# Patient Record
Sex: FEMALE | Race: BLACK OR AFRICAN AMERICAN | NOT HISPANIC OR LATINO | Employment: FULL TIME | RURAL
[De-identification: names, ages, dates, MRNs, and addresses within clinical notes are randomized per-mention and may not be internally consistent; named-entity substitution may affect disease eponyms.]

---

## 2024-02-08 DIAGNOSIS — M83.9 VITAMIN D DEFICIENT OSTEOMALACIA: Primary | ICD-10-CM

## 2024-02-12 RX ORDER — ERGOCALCIFEROL 1.25 MG/1
50000 CAPSULE ORAL
Qty: 28 CAPSULE | Refills: 3 | Status: SHIPPED | OUTPATIENT
Start: 2024-02-12

## 2024-02-15 ENCOUNTER — OFFICE VISIT (OUTPATIENT)
Dept: PRIMARY CARE CLINIC | Facility: CLINIC | Age: 44
End: 2024-02-15
Payer: COMMERCIAL

## 2024-02-15 ENCOUNTER — PATIENT MESSAGE (OUTPATIENT)
Dept: PRIMARY CARE CLINIC | Facility: CLINIC | Age: 44
End: 2024-02-15
Payer: COMMERCIAL

## 2024-02-15 VITALS
OXYGEN SATURATION: 98 % | WEIGHT: 271 LBS | HEIGHT: 62 IN | DIASTOLIC BLOOD PRESSURE: 84 MMHG | BODY MASS INDEX: 49.87 KG/M2 | HEART RATE: 74 BPM | RESPIRATION RATE: 16 BRPM | SYSTOLIC BLOOD PRESSURE: 138 MMHG | TEMPERATURE: 98 F

## 2024-02-15 DIAGNOSIS — B37.9 ANTIBIOTIC-INDUCED YEAST INFECTION: ICD-10-CM

## 2024-02-15 DIAGNOSIS — Z00.00 ROUTINE GENERAL MEDICAL EXAMINATION AT A HEALTH CARE FACILITY: Primary | ICD-10-CM

## 2024-02-15 DIAGNOSIS — T36.95XA ANTIBIOTIC-INDUCED YEAST INFECTION: ICD-10-CM

## 2024-02-15 DIAGNOSIS — Z12.11 SCREENING FOR MALIGNANT NEOPLASM OF COLON: ICD-10-CM

## 2024-02-15 DIAGNOSIS — Z79.899 ENCOUNTER FOR LONG-TERM CURRENT USE OF MEDICATION: ICD-10-CM

## 2024-02-15 DIAGNOSIS — Z13.1 SCREENING FOR DIABETES MELLITUS: ICD-10-CM

## 2024-02-15 DIAGNOSIS — Z76.0 MEDICATION REFILL: ICD-10-CM

## 2024-02-15 DIAGNOSIS — Z00.00 ENCOUNTER FOR MEDICAL EXAMINATION TO ESTABLISH CARE: ICD-10-CM

## 2024-02-15 DIAGNOSIS — Z86.39 HISTORY OF HYPOKALEMIA: ICD-10-CM

## 2024-02-15 DIAGNOSIS — Z13.220 SCREENING FOR LIPID DISORDERS: ICD-10-CM

## 2024-02-15 DIAGNOSIS — Z11.59 NEED FOR HEPATITIS C SCREENING TEST: ICD-10-CM

## 2024-02-15 DIAGNOSIS — Z11.4 SCREENING FOR HIV (HUMAN IMMUNODEFICIENCY VIRUS): ICD-10-CM

## 2024-02-15 DIAGNOSIS — I10 PRIMARY HYPERTENSION: ICD-10-CM

## 2024-02-15 PROCEDURE — 3079F DIAST BP 80-89 MM HG: CPT | Mod: ,,, | Performed by: NURSE PRACTITIONER

## 2024-02-15 PROCEDURE — 1159F MED LIST DOCD IN RCRD: CPT | Mod: ,,, | Performed by: NURSE PRACTITIONER

## 2024-02-15 PROCEDURE — 3008F BODY MASS INDEX DOCD: CPT | Mod: ,,, | Performed by: NURSE PRACTITIONER

## 2024-02-15 PROCEDURE — 3075F SYST BP GE 130 - 139MM HG: CPT | Mod: ,,, | Performed by: NURSE PRACTITIONER

## 2024-02-15 PROCEDURE — 99396 PREV VISIT EST AGE 40-64: CPT | Mod: ,,, | Performed by: NURSE PRACTITIONER

## 2024-02-15 PROCEDURE — 4010F ACE/ARB THERAPY RXD/TAKEN: CPT | Mod: ,,, | Performed by: NURSE PRACTITIONER

## 2024-02-15 RX ORDER — POTASSIUM CHLORIDE 750 MG/1
10 TABLET, EXTENDED RELEASE ORAL DAILY
Qty: 90 TABLET | Refills: 3 | Status: SHIPPED | OUTPATIENT
Start: 2024-02-15

## 2024-02-15 RX ORDER — LOSARTAN POTASSIUM 50 MG/1
50 TABLET ORAL DAILY
Qty: 90 TABLET | Refills: 3 | Status: SHIPPED | OUTPATIENT
Start: 2024-02-15 | End: 2025-02-09

## 2024-02-15 RX ORDER — FLUCONAZOLE 150 MG/1
150 TABLET ORAL DAILY
Qty: 1 TABLET | Refills: 2 | Status: SHIPPED | OUTPATIENT
Start: 2024-02-15 | End: 2024-02-18

## 2024-02-15 RX ORDER — GABAPENTIN 300 MG/1
300 CAPSULE ORAL 3 TIMES DAILY
Qty: 270 CAPSULE | Refills: 3 | Status: SHIPPED | OUTPATIENT
Start: 2024-02-15 | End: 2024-02-15

## 2024-02-15 NOTE — PROGRESS NOTES
Pt is a 42 y/o BF here for [facility], to est care, and for med refills.    Past Medical History:   Diagnosis Date    Asthma     COVID     Hypertension      Patient Active Problem List   Diagnosis    Asthma    Hypertension    Chronic migraine with aura    Intractable migraine with aura without status migrainosus    Abdominal obesity and metabolic syndrome    Chronic idiopathic constipation    Acute left-sided low back pain with left-sided sciatica    Insomnia    Arthritis of left knee       Review of Systems   Constitutional:  Negative for chills and fever.   Respiratory:  Negative for shortness of breath.    Cardiovascular:  Negative for chest pain.   Gastrointestinal:  Negative for abdominal pain, blood in stool, constipation, diarrhea, melena, nausea and vomiting.   Skin:  Negative for rash.   Neurological:  Negative for weakness.     Physical Exam  Vitals reviewed. Exam conducted with a chaperone present.   Constitutional:       General: She is not in acute distress.     Appearance: Normal appearance.   HENT:      Head: Normocephalic.      Mouth/Throat:      Mouth: Mucous membranes are moist.      Pharynx: Oropharynx is clear.   Eyes:      General: No scleral icterus.     Pupils: Pupils are equal, round, and reactive to light.   Cardiovascular:      Rate and Rhythm: Normal rate.   Pulmonary:      Effort: Pulmonary effort is normal. No respiratory distress.      Breath sounds: Normal breath sounds.   Abdominal:      General: There is no distension.      Palpations: Abdomen is soft.      Tenderness: There is no abdominal tenderness. There is no guarding or rebound.   Skin:     General: Skin is warm and dry.   Neurological:      General: No focal deficit present.      Mental Status: She is alert and oriented to person, place, and time. Mental status is at baseline.   Psychiatric:         Mood and Affect: Mood normal.         Behavior: Behavior normal.         Thought Content: Thought content normal.          Judgment: Judgment normal.       Plan  Routine general medical examination at a health care facility  -     CBC Auto Differential; Future; Expected date: 02/15/2024  -     Comprehensive Metabolic Panel; Future; Expected date: 02/15/2024    Screening for diabetes mellitus  -     POCT glucose    Screening for lipid disorders  -     Lipid Panel; Future    Need for hepatitis C screening test  -     Cancel: Hepatitis C Antibody; Future; Expected date: 02/15/2024    Screening for HIV (human immunodeficiency virus)  -     Cancel: HIV 1/2 Ag/Ab (4th Gen); Future; Expected date: 02/15/2024    History of hypokalemia  -     Comprehensive Metabolic Panel; Future; Expected date: 02/15/2024  -     potassium chloride (KLOR-CON) 10 MEQ TbSR; Take 1 tablet (10 mEq total) by mouth once daily.  Dispense: 90 tablet; Refill: 3    Encounter for long-term current use of medication  -     CBC Auto Differential; Future; Expected date: 02/15/2024  -     Comprehensive Metabolic Panel; Future; Expected date: 02/15/2024    Screening for malignant neoplasm of colon  -     Colonoscopy; Future; Expected date: 02/15/2024    Primary hypertension  -     losartan (COZAAR) 50 MG tablet; Take 1 tablet (50 mg total) by mouth once daily.  Dispense: 90 tablet; Refill: 3    Medication refill  -     potassium chloride (KLOR-CON) 10 MEQ TbSR; Take 1 tablet (10 mEq total) by mouth once daily.  Dispense: 90 tablet; Refill: 3  -     losartan (COZAAR) 50 MG tablet; Take 1 tablet (50 mg total) by mouth once daily.  Dispense: 90 tablet; Refill: 3      Pt states she has already had HIV & HCV screening  Follow up in about 6 months (around 8/15/2024).

## 2024-02-16 ENCOUNTER — PATIENT OUTREACH (OUTPATIENT)
Dept: ADMINISTRATIVE | Facility: HOSPITAL | Age: 44
End: 2024-02-16

## 2024-02-16 NOTE — PROGRESS NOTES
Population Health Chart Review & Patient Outreach Details      Further Action Needed If Patient Returns Outreach:            Updates Requested / Reviewed:     []  Care Everywhere    []     []  External Sources ([facility], [facility], [facility], etc.)    [] [facility]   [] [facility]   [] Other:    []  Care Team Updated   []  Removed  or Duplicate Orders   []  Immunization Reconciliation Completed / Queried    [] [place]   [] [place]   [] [place]   [] [place]      Health Maintenance Topics Addressed and Outreach Outcomes / Actions Taken:             Breast Cancer Screening []  Mammogram Order Placed    []  Mammogram Screening Scheduled    []  External Records Requested & Care Team Updated if Applicable    []  External Records Uploaded & Care Team Updated if Applicable    []  Pt Declined Scheduling Mammogram    []  Pt Will Schedule with External Provider / Order Routed & Care Team Updated if Applicable              Cervical Cancer Screening []  Pap Smear Scheduled in Primary Care or OBGYN    []  External Records Requested & Care Team Updated if Applicable       []  External Records Uploaded, Care Team Updated, & History Updated if Applicable    []  Patient Declined Scheduling Pap Smear    []  Patient Will Schedule with External Provider & Care Team Updated if Applicable                  Colorectal Cancer Screening []  Colonoscopy Case Request / Referral / Home Test Order Placed    []  External Records Requested & Care Team Updated if Applicable    []  External Records Uploaded, Care Team Updated, & History Updated if Applicable    []  Patient Declined Completing Colon Cancer Screening    []  Patient Will Schedule with External Provider & Care Team Updated if Applicable    []  Fit Kit Mailed (add the SmartPhrase under additional notes)    []  Reminded Patient to Complete Home Test                Diabetic Eye Exam []  Eye Exam Screening Order Placed    []  Eye Camera Scheduled or Optometry/Ophthalmology Referral  Placed    []  External Records Requested & Care Team Updated if Applicable    []  External Records Uploaded, Care Team Updated, & History Updated if Applicable    []  Patient Declined Scheduling Eye Exam    []  Patient Will Schedule with External Provider & Care Team Updated if Applicable             Blood Pressure Control []  Primary Care Follow Up Visit Scheduled     []  Remote Blood Pressure Reading Captured    []  Patient Declined Remote Reading or Scheduling Appt - Escalated to PCP    []  Patient Will Call Back or Send Portal Message with Reading                 HbA1c & Other Labs []  Overdue Lab(s) Ordered    []  Overdue Lab(s) Scheduled    []  External Records Uploaded & Care Team Updated if Applicable    []  Primary Care Follow Up Visit Scheduled     []  Reminded Patient to Complete A1c Home Test    []  Patient Declined Scheduling Labs or Will Call Back to Schedule    []  Patient Will Schedule with External Provider / Order Routed, & Care Team Updated if Applicable           Primary Care Appointment []  Primary Care Appt Scheduled    []  Patient Declined Scheduling or Will Call Back to Schedule    []  Pt Established with External Provider, Updated Care Team, & Informed Pt to Notify Payor if Applicable           Medication Adherence /    Statin Use []  Primary Care Appointment Scheduled    []  Patient Reminded to  Prescription    []  Patient Declined, Provider Notified if Needed    []  Sent Provider Message to Review to Evaluate Pt for Statin, Add Exclusion Dx Codes, Document   Exclusion in Problem List, Change Statin Intensity Level to Moderate or High Intensity if Applicable                Osteoporosis Screening []  Dexa Order Placed    []  Dexa Appointment Scheduled    []  External Records Requested & Care Team Updated    []  External Records Uploaded, Care Team Updated, & History Updated if Applicable    []  Patient Declined Scheduling Dexa or Will Call Back to Schedule    []  Patient Will Schedule  with External Provider / Order Routed & Care Team Updated if Applicable       Additional Notes:.  Post visit Population Health review of encounter with date of service  2/15/24 with Kimberly.  All required HY components in encounter.    Followup appt for: Pt needs appt for 2025 HY sent to [facility] to schedule via one note. Lisa

## 2024-02-21 DIAGNOSIS — Z12.11 COLON CANCER SCREENING: Primary | ICD-10-CM

## 2024-02-21 RX ORDER — POLYETHYLENE GLYCOL 3350, SODIUM SULFATE ANHYDROUS, SODIUM BICARBONATE, SODIUM CHLORIDE, POTASSIUM CHLORIDE 236; 22.74; 6.74; 5.86; 2.97 G/4L; G/4L; G/4L; G/4L; G/4L
4 POWDER, FOR SOLUTION ORAL ONCE
Qty: 4000 ML | Refills: 0 | Status: SHIPPED | OUTPATIENT
Start: 2024-02-21 | End: 2024-02-21

## 2024-02-22 ENCOUNTER — OFFICE VISIT (OUTPATIENT)
Dept: NEUROLOGY | Facility: CLINIC | Age: 44
End: 2024-02-22
Payer: COMMERCIAL

## 2024-02-22 VITALS
HEART RATE: 103 BPM | WEIGHT: 263 LBS | DIASTOLIC BLOOD PRESSURE: 80 MMHG | SYSTOLIC BLOOD PRESSURE: 120 MMHG | BODY MASS INDEX: 48.4 KG/M2 | OXYGEN SATURATION: 99 % | HEIGHT: 62 IN

## 2024-02-22 DIAGNOSIS — G43.119 INTRACTABLE MIGRAINE WITH AURA WITHOUT STATUS MIGRAINOSUS: Primary | ICD-10-CM

## 2024-02-22 DIAGNOSIS — R11.0 NAUSEA: ICD-10-CM

## 2024-02-22 PROCEDURE — 1160F RVW MEDS BY RX/DR IN RCRD: CPT | Mod: ,,, | Performed by: NURSE PRACTITIONER

## 2024-02-22 PROCEDURE — 4010F ACE/ARB THERAPY RXD/TAKEN: CPT | Mod: ,,, | Performed by: NURSE PRACTITIONER

## 2024-02-22 PROCEDURE — 3074F SYST BP LT 130 MM HG: CPT | Mod: ,,, | Performed by: NURSE PRACTITIONER

## 2024-02-22 PROCEDURE — 99213 OFFICE O/P EST LOW 20 MIN: CPT | Mod: S$PBB,,, | Performed by: NURSE PRACTITIONER

## 2024-02-22 PROCEDURE — 1159F MED LIST DOCD IN RCRD: CPT | Mod: ,,, | Performed by: NURSE PRACTITIONER

## 2024-02-22 PROCEDURE — 3008F BODY MASS INDEX DOCD: CPT | Mod: ,,, | Performed by: NURSE PRACTITIONER

## 2024-02-22 PROCEDURE — 99214 OFFICE O/P EST MOD 30 MIN: CPT | Mod: PBBFAC | Performed by: NURSE PRACTITIONER

## 2024-02-22 PROCEDURE — 3079F DIAST BP 80-89 MM HG: CPT | Mod: ,,, | Performed by: NURSE PRACTITIONER

## 2024-02-22 RX ORDER — RIMEGEPANT SULFATE 75 MG/75MG
TABLET, ORALLY DISINTEGRATING ORAL
Qty: 16 TABLET | Refills: 2 | Status: SHIPPED | OUTPATIENT
Start: 2024-02-22

## 2024-02-22 RX ORDER — PROMETHAZINE HYDROCHLORIDE 25 MG/1
TABLET ORAL
Qty: 20 TABLET | Refills: 2 | Status: SHIPPED | OUTPATIENT
Start: 2024-02-22

## 2024-02-22 NOTE — PROGRESS NOTES
Subjective:       Patient ID: Rosa Rutledge is a 43 y.o. female     Chief Complaint:    Chief Complaint   Patient presents with    Follow-up        Allergies:  Patient has no known allergies.    Current Medications:    Outpatient Encounter Medications as of 2024   Medication Sig Dispense Refill    budesonide 180mcg (PULMICORT FLEXHALER) 180 mcg/actuation AePB Inhale 2 puffs into the lungs every 4 to 6 hours as needed.      ergocalciferol (ERGOCALCIFEROL) 50,000 unit Cap TAKE 1 CAPSULE BY MOUTH TWICE A WEEK 28 capsule 3    galcanezumab-gnlm (EMGALITY PEN) 120 mg/mL PnIj Inject 120 mg into the skin every 28 days. 1 mL 11    LINZESS 145 mcg Cap capsule Take 1 capsule (145 mcg total) by mouth before breakfast. 90 capsule 3    losartan (COZAAR) 50 MG tablet Take 1 tablet (50 mg total) by mouth once daily. 90 tablet 3    mv,calcium,min/iron/folic/vitK (ONE-A-DAY WOMEN'S COMPLETE ORAL) Take 1 tablet by mouth once daily.      potassium chloride (KLOR-CON) 10 MEQ TbSR Take 1 tablet (10 mEq total) by mouth once daily. 90 tablet 3    [DISCONTINUED] NURTEC 75 mg odt DISSOLVE 1 TABLET ON THE TONGUE AT ONSET HEADACHE      [DISCONTINUED] promethazine (PHENERGAN) 25 MG tablet TAKE 1 TABLET BY MOUTH EVERY 8 HOURS AS NEEDED FOR MIGRAINE 20 tablet 0    NURTEC 75 mg odt DISSOLVE 1 TABLET ON THE TONGUE AT ONSET HEADACHE 16 tablet 2    [] polyethylene glycol (GOLYTELY) 236-22.74-6.74 -5.86 gram suspension Take 4,000 mLs (4 L total) by mouth once. for 1 dose 4000 mL 0    promethazine (PHENERGAN) 25 MG tablet TAKE 1 TABLET BY MOUTH EVERY 8 HOURS AS NEEDED FOR MIGRAINE 20 tablet 2    tiZANidine (ZANAFLEX) 4 MG tablet TAKE 1 TABLET BY MOUTH ONCE DAILY AT NIGHT AS NEEDED FOR MUSCLE SPASM       No facility-administered encounter medications on file as of 2024.       History of Present Illness  43 yr old A.A. right handed female presents with headaches.    Prior Trokendi XR was not effective, though it did help with the  intensity.  She was still experiencing about 8 migraines a month.  Rated 6/10, bilateral frontal with photophobia and nausea and vomiting.    She is doing very well on the Emgality once monthly and denies any complications.  On maxalt and phenergan to use PRN.  Prior relpax was not effective.    Vitamin D level in November was 50.    She follows with opthamology every April           Review of Systems  Review of Systems   Constitutional:  Negative for diaphoresis and fever.   HENT:  Negative for congestion, hearing loss and tinnitus.    Eyes:  Negative for blurred vision, double vision, photophobia, discharge and redness.   Respiratory:  Negative for cough and shortness of breath.    Cardiovascular:  Negative for chest pain.   Gastrointestinal:  Negative for abdominal pain, nausea and vomiting.   Musculoskeletal:  Negative for back pain, joint pain, myalgias and neck pain.   Skin:  Negative for itching and rash.   Neurological:  Positive for headaches. Negative for dizziness, tremors, sensory change, speech change, focal weakness, seizures, loss of consciousness and weakness.   Psychiatric/Behavioral:  Negative for depression, hallucinations and memory loss. The patient does not have insomnia.    All other systems reviewed and are negative.     Objective:     NEUROLOGICAL EXAMINATION:     MENTAL STATUS   Oriented to person, place, and time.   Registration: recalls 3 of 3 objects. Recall at 5 minutes: recalls 3 of 3 objects.   Attention: normal. Concentration: normal.   Speech: speech is normal   Level of consciousness: alert  Knowledge: good and consistent with education.   Normal comprehension.     CRANIAL NERVES     CN II   Visual fields full to confrontation.   Visual acuity: normal  Right visual field deficit: none  Left visual field deficit: none     CN III, IV, VI   Pupils are equal, round, and reactive to light.  Extraocular motions are normal.   Right pupil: Size: 3 mm. Shape: regular. Reactivity: brisk.  Consensual response: intact. Accommodation: intact.   Left pupil: Size: 3 mm. Shape: regular. Reactivity: brisk. Consensual response: intact. Accommodation: intact.   CN III: no CN III palsy  CN VI: no CN VI palsy  Nystagmus: none   Diplopia: none  Upgaze: normal  Downgaze: normal  Conjugate gaze: present  Vestibulo-ocular reflex: present    CN V   Facial sensation intact.   Right facial sensation deficit: none  Left facial sensation deficit: none  Right corneal reflex: normal  Left corneal reflex: normal    CN VII   Facial expression full, symmetric.   Right facial weakness: none  Left facial weakness: none  Right taste: normal  Left taste: normal    CN VIII   CN VIII normal.   Hearing: intact    CN IX, X   CN IX normal.   CN X normal.   Palate: symmetric    CN XI   CN XI normal.   Right sternocleidomastoid strength: normal  Left sternocleidomastoid strength: normal  Right trapezius strength: normal  Left trapezius strength: normal    CN XII   CN XII normal.   Tongue: not atrophic  Fasciculations: absent  Tongue deviation: none    MOTOR EXAM   Muscle bulk: normal  Overall muscle tone: normal  Right arm tone: normal  Left arm tone: normal  Right arm pronator drift: absent  Left arm pronator drift: absent  Right leg tone: normal  Left leg tone: normal    Strength   Right neck flexion: 5/5  Left neck flexion: 5/5  Right neck extension: 5/5  Left neck extension: 5/5  Right deltoid: 5/5  Left deltoid: 5/5  Right biceps: 5/5  Left biceps: 5/5  Right triceps: 5/5  Left triceps: 5/5  Right wrist flexion: 5/5  Left wrist flexion: 5/5  Right wrist extension: 5/5  Left wrist extension: 5/5  Right interossei: 5/5  Left interossei: 5/5  Right iliopsoas: 5/5  Left iliopsoas: 5/5  Right quadriceps: 5/5  Left quadriceps: 5/5  Right hamstrin/5  Left hamstrin/5  Right anterior tibial: 5/5  Left anterior tibial: 5/5  Right posterior tibial: 5/5  Left posterior tibial: 5/5  Right gastroc: 5/5  Left gastroc: 5/5    REFLEXES      Reflexes   Right brachioradialis: 2+  Left brachioradialis: 2+  Right biceps: 2+  Left biceps: 2+  Right triceps: 2+  Left triceps: 2+  Right patellar: 2+  Left patellar: 2+  Right achilles: 2+  Left achilles: 2+  Right plantar: normal  Left plantar: normal  Right Cruz: absent  Left Cruz: absent  Right ankle clonus: absent  Left ankle clonus: absent  Right pendular knee jerk: absent  Left pendular knee jerk: absent    SENSORY EXAM   Light touch normal.   Right arm light touch: normal  Left arm light touch: normal  Right leg light touch: normal  Left leg light touch: normal  Vibration normal.   Right arm vibration: normal  Left arm vibration: normal  Right leg vibration: normal  Left leg vibration: normal  Proprioception normal.   Right arm proprioception: normal  Left arm proprioception: normal  Right leg proprioception: normal  Left leg proprioception: normal  Pinprick normal.   Right arm pinprick: normal  Left arm pinprick: normal  Right leg pinprick: normal  Left leg pinprick: normal  Graphesthesia: normal  Romberg: negative  Stereognosis: normal    GAIT AND COORDINATION     Gait  Gait: normal     Coordination   Finger to nose coordination: normal  Heel to shin coordination: normal  Tandem walking coordination: normal    Tremor   Resting tremor: absent  Intention tremor: absent  Action tremor: absent       Physical Exam  Vitals and nursing note reviewed.   Constitutional:       Appearance: Normal appearance.   HENT:      Head: Normocephalic.   Eyes:      Extraocular Movements: Extraocular movements intact and EOM normal.      Pupils: Pupils are equal, round, and reactive to light.   Cardiovascular:      Rate and Rhythm: Normal rate and regular rhythm.   Pulmonary:      Effort: Pulmonary effort is normal.      Breath sounds: Normal breath sounds.   Musculoskeletal:         General: No swelling or tenderness. Normal range of motion.      Cervical back: Normal range of motion and neck supple.      Right  lower leg: No edema.      Left lower leg: No edema.   Skin:     General: Skin is warm and dry.      Coloration: Skin is not jaundiced.      Findings: No rash.   Neurological:      General: No focal deficit present.      Mental Status: She is alert and oriented to person, place, and time.      GCS: GCS eye subscore is 4. GCS verbal subscore is 5. GCS motor subscore is 6.      Cranial Nerves: No cranial nerve deficit.      Sensory: No sensory deficit.      Motor: Motor function is intact. No weakness.      Coordination: Coordination is intact. Coordination normal. Finger-Nose-Finger Test, Heel to Shin Test and Romberg Test normal.      Gait: Gait is intact. Gait and tandem walk normal.      Deep Tendon Reflexes: Reflexes normal.      Reflex Scores:       Tricep reflexes are 2+ on the right side and 2+ on the left side.       Bicep reflexes are 2+ on the right side and 2+ on the left side.       Brachioradialis reflexes are 2+ on the right side and 2+ on the left side.       Patellar reflexes are 2+ on the right side and 2+ on the left side.       Achilles reflexes are 2+ on the right side and 2+ on the left side.  Psychiatric:         Mood and Affect: Mood normal.         Speech: Speech normal.         Behavior: Behavior normal.          Assessment:     Problem List Items Addressed This Visit          Neuro    Intractable migraine with aura without status migrainosus - Primary    Relevant Medications    NURTEC 75 mg odt     Other Visit Diagnoses       Nausea        Relevant Medications    promethazine (PHENERGAN) 25 MG tablet                 Primary Diagnosis and ICD10  Intractable migraine with aura without status migrainosus [G43.119]    Plan:     Patient Instructions   1. Continue Emgality once monthly injections    2. Continue phenergan and maxalt PRN    3. Continue the Nurtec as directed    4. Keep opthamology followup    Medications Discontinued During This Encounter   Medication Reason    promethazine  (PHENERGAN) 25 MG tablet Reorder    NURTEC 75 mg odt Reorder         Requested Prescriptions     Signed Prescriptions Disp Refills    NURTEC 75 mg odt 16 tablet 2     Sig: DISSOLVE 1 TABLET ON THE TONGUE AT ONSET HEADACHE    promethazine (PHENERGAN) 25 MG tablet 20 tablet 2     Sig: TAKE 1 TABLET BY MOUTH EVERY 8 HOURS AS NEEDED FOR MIGRAINE       No orders of the defined types were placed in this encounter.

## 2024-02-28 ENCOUNTER — OFFICE VISIT (OUTPATIENT)
Dept: OTOLARYNGOLOGY | Facility: CLINIC | Age: 44
End: 2024-02-28
Payer: COMMERCIAL

## 2024-02-28 VITALS — WEIGHT: 263 LBS | HEIGHT: 62 IN | BODY MASS INDEX: 48.4 KG/M2

## 2024-02-28 DIAGNOSIS — H65.21 RIGHT CHRONIC SEROUS OTITIS MEDIA: ICD-10-CM

## 2024-02-28 DIAGNOSIS — H60.91 OTITIS EXTERNA OF RIGHT EAR, UNSPECIFIED CHRONICITY, UNSPECIFIED TYPE: Primary | ICD-10-CM

## 2024-02-28 PROCEDURE — 1160F RVW MEDS BY RX/DR IN RCRD: CPT | Mod: ,,, | Performed by: OTOLARYNGOLOGY

## 2024-02-28 PROCEDURE — 99213 OFFICE O/P EST LOW 20 MIN: CPT | Mod: PBBFAC | Performed by: OTOLARYNGOLOGY

## 2024-02-28 PROCEDURE — 99204 OFFICE O/P NEW MOD 45 MIN: CPT | Mod: S$PBB,,, | Performed by: OTOLARYNGOLOGY

## 2024-02-28 PROCEDURE — 1159F MED LIST DOCD IN RCRD: CPT | Mod: ,,, | Performed by: OTOLARYNGOLOGY

## 2024-02-28 PROCEDURE — 4010F ACE/ARB THERAPY RXD/TAKEN: CPT | Mod: ,,, | Performed by: OTOLARYNGOLOGY

## 2024-02-28 PROCEDURE — 3008F BODY MASS INDEX DOCD: CPT | Mod: ,,, | Performed by: OTOLARYNGOLOGY

## 2024-02-28 RX ORDER — AMOXICILLIN AND CLAVULANATE POTASSIUM 500; 125 MG/1; MG/1
1 TABLET, FILM COATED ORAL 2 TIMES DAILY
Qty: 28 TABLET | Refills: 0 | Status: SHIPPED | OUTPATIENT
Start: 2024-02-28 | End: 2024-04-12

## 2024-02-28 RX ORDER — NEOMYCIN SULFATE, POLYMYXIN B SULFATE AND HYDROCORTISONE 10; 3.5; 1 MG/ML; MG/ML; [USP'U]/ML
3 SUSPENSION/ DROPS AURICULAR (OTIC) 2 TIMES DAILY
Qty: 10 ML | Refills: 0 | Status: SHIPPED | OUTPATIENT
Start: 2024-02-28 | End: 2024-06-12

## 2024-02-28 NOTE — PROGRESS NOTES
Subjective:       Patient ID: Rosa Rutledge is a 43 y.o. female.    Chief Complaint: Cerumen Impaction (Right ear worse than left ear. Pt states she gets ear infections in her right ear about 3 years with a ear cleaning. )    HPI  Review of Systems   HENT:  Positive for ear pain.    All other systems reviewed and are negative.      Objective:      Physical Exam  General: NAD  Head: Normocephalic, atraumatic, no facial asymmetry/normal strength,  Ears: Both auricules normal in appearance, w/o deformities tympanic membranes dull external auditory canals dry irritated   Nose: External nose w/o deformities normal turbinates no drainage or inflammation  Oral Cavity: Lips, gums, floor of mouth, tongue hard palate, and buccal mucosa without mass/lesion  Oropharynx: Mucosa pink and moist, soft palate, posterior pharynx and oropharyngeal wall without mass/lesion  Neck: Supple, symmetric, trachea midline, no palpable mass/lesion, no palpable cervical lymphadenopathy  Skin: Warm and dry, no concerning lesions  Respiratory: Respirations even, unlabored  Assessment:       1. Otitis externa of right ear, unspecified chronicity, unspecified type    2. Right chronic serous otitis media        Plan:       Augmentin CSP drops f/u 2 weeks

## 2024-03-18 ENCOUNTER — OFFICE VISIT (OUTPATIENT)
Dept: ORTHOPEDICS | Facility: CLINIC | Age: 44
End: 2024-03-18
Payer: COMMERCIAL

## 2024-03-18 VITALS — BODY MASS INDEX: 48.4 KG/M2 | HEIGHT: 62 IN | WEIGHT: 263 LBS

## 2024-03-18 DIAGNOSIS — M25.562 LEFT KNEE PAIN, UNSPECIFIED CHRONICITY: Primary | ICD-10-CM

## 2024-03-18 PROCEDURE — 99213 OFFICE O/P EST LOW 20 MIN: CPT | Mod: PBBFAC,25 | Performed by: ORTHOPAEDIC SURGERY

## 2024-03-18 PROCEDURE — 4010F ACE/ARB THERAPY RXD/TAKEN: CPT | Mod: ,,, | Performed by: ORTHOPAEDIC SURGERY

## 2024-03-18 PROCEDURE — 1159F MED LIST DOCD IN RCRD: CPT | Mod: ,,, | Performed by: ORTHOPAEDIC SURGERY

## 2024-03-18 PROCEDURE — 99213 OFFICE O/P EST LOW 20 MIN: CPT | Mod: S$PBB,25,, | Performed by: ORTHOPAEDIC SURGERY

## 2024-03-18 PROCEDURE — 99999PBSHW PR PBB SHADOW TECHNICAL ONLY FILED TO HB: Mod: PBBFAC,,,

## 2024-03-18 PROCEDURE — 20610 DRAIN/INJ JOINT/BURSA W/O US: CPT | Mod: PBBFAC | Performed by: ORTHOPAEDIC SURGERY

## 2024-03-18 RX ORDER — TRIAMCINOLONE ACETONIDE 40 MG/ML
40 INJECTION, SUSPENSION INTRA-ARTICULAR; INTRAMUSCULAR
Status: DISCONTINUED | OUTPATIENT
Start: 2024-03-18 | End: 2024-03-18 | Stop reason: HOSPADM

## 2024-03-18 RX ORDER — BUPIVACAINE HYDROCHLORIDE 2.5 MG/ML
1 INJECTION, SOLUTION EPIDURAL; INFILTRATION; INTRACAUDAL
Status: DISCONTINUED | OUTPATIENT
Start: 2024-03-18 | End: 2024-03-18 | Stop reason: HOSPADM

## 2024-03-18 RX ADMIN — BUPIVACAINE HYDROCHLORIDE 1 ML: 2.5 INJECTION, SOLUTION EPIDURAL; INFILTRATION; INTRACAUDAL at 01:03

## 2024-03-18 RX ADMIN — TRIAMCINOLONE ACETONIDE 40 MG: 40 INJECTION, SUSPENSION INTRA-ARTICULAR; INTRAMUSCULAR at 01:03

## 2024-03-18 NOTE — PROCEDURES
Large Joint Aspiration/Injection: L knee    Date/Time: 3/18/2024 1:30 PM    Performed by: Dontae Downing MD  Authorized by: Dontae Downing MD    Consent Done?:  Yes (Verbal)  Indications:  Arthritis    Details:  Needle Size:  22 G  Ultrasonic Guidance for needle placement?: No    Approach:  Anterolateral  Location:  Knee  Site:  L knee  Medications:  1 mL BUPivacaine (PF) 0.25% (2.5 mg/ml) 0.25 % (2.5 mg/mL); 40 mg triamcinolone acetonide 40 mg/mL  Patient tolerance:  Patient tolerated the procedure well with no immediate complications

## 2024-03-18 NOTE — PROGRESS NOTES
Patient is here for follow-up of her left knee arthritic changes.  I injected her left knee 1 cc Marcaine 1 cc Kenalog.  Let her use her leg as tolerates.  She weightbear as tolerates.  I will follow up 3 months.Rosa Rutledge presents today for knee pain from ploa.  leftKnee prepped sterile technique and injected with 1cc marcaine snd 1cc kenalog.  Tolerated procedure well. Verbal consent obtained

## 2024-04-10 ENCOUNTER — TELEPHONE (OUTPATIENT)
Dept: NEUROLOGY | Facility: CLINIC | Age: 44
End: 2024-04-10
Payer: COMMERCIAL

## 2024-04-12 ENCOUNTER — ANESTHESIA EVENT (OUTPATIENT)
Dept: GASTROENTEROLOGY | Facility: HOSPITAL | Age: 44
End: 2024-04-12
Payer: COMMERCIAL

## 2024-04-12 ENCOUNTER — HOSPITAL ENCOUNTER (OUTPATIENT)
Dept: GASTROENTEROLOGY | Facility: HOSPITAL | Age: 44
Discharge: HOME | End: 2024-04-12
Attending: NURSE PRACTITIONER | Admitting: INTERNAL MEDICINE
Payer: COMMERCIAL

## 2024-04-12 ENCOUNTER — ANESTHESIA (OUTPATIENT)
Dept: GASTROENTEROLOGY | Facility: HOSPITAL | Age: 44
End: 2024-04-12
Payer: COMMERCIAL

## 2024-04-12 VITALS
DIASTOLIC BLOOD PRESSURE: 43 MMHG | RESPIRATION RATE: 16 BRPM | SYSTOLIC BLOOD PRESSURE: 105 MMHG | HEIGHT: 62 IN | TEMPERATURE: 97 F | HEART RATE: 78 BPM | OXYGEN SATURATION: 99 % | WEIGHT: 238 LBS | BODY MASS INDEX: 43.79 KG/M2

## 2024-04-12 DIAGNOSIS — Z12.11 SCREENING FOR MALIGNANT NEOPLASM OF COLON: ICD-10-CM

## 2024-04-12 PROCEDURE — 45380 COLONOSCOPY AND BIOPSY: CPT | Mod: 33,,, | Performed by: INTERNAL MEDICINE

## 2024-04-12 PROCEDURE — 88305 TISSUE EXAM BY PATHOLOGIST: CPT | Mod: TC,SUR | Performed by: INTERNAL MEDICINE

## 2024-04-12 PROCEDURE — 88342 IMHCHEM/IMCYTCHM 1ST ANTB: CPT | Mod: 26,,, | Performed by: PATHOLOGY

## 2024-04-12 PROCEDURE — 88305 TISSUE EXAM BY PATHOLOGIST: CPT | Mod: 26,,, | Performed by: PATHOLOGY

## 2024-04-12 PROCEDURE — 37000008 HC ANESTHESIA 1ST 15 MINUTES

## 2024-04-12 PROCEDURE — 37000009 HC ANESTHESIA EA ADD 15 MINS

## 2024-04-12 PROCEDURE — D9220A PRA ANESTHESIA: Mod: 33,,, | Performed by: NURSE ANESTHETIST, CERTIFIED REGISTERED

## 2024-04-12 PROCEDURE — 63600175 PHARM REV CODE 636 W HCPCS: Performed by: NURSE ANESTHETIST, CERTIFIED REGISTERED

## 2024-04-12 PROCEDURE — 27000284 HC CANNULA NASAL: Performed by: NURSE ANESTHETIST, CERTIFIED REGISTERED

## 2024-04-12 PROCEDURE — 25000003 PHARM REV CODE 250: Performed by: NURSE ANESTHETIST, CERTIFIED REGISTERED

## 2024-04-12 PROCEDURE — 45380 COLONOSCOPY AND BIOPSY: CPT | Mod: PT | Performed by: INTERNAL MEDICINE

## 2024-04-12 RX ORDER — FENTANYL CITRATE 50 UG/ML
INJECTION, SOLUTION INTRAMUSCULAR; INTRAVENOUS
Status: DISCONTINUED | OUTPATIENT
Start: 2024-04-12 | End: 2024-04-12

## 2024-04-12 RX ORDER — PROPOFOL 10 MG/ML
VIAL (ML) INTRAVENOUS
Status: DISCONTINUED | OUTPATIENT
Start: 2024-04-12 | End: 2024-04-12

## 2024-04-12 RX ORDER — SODIUM CHLORIDE 9 MG/ML
INJECTION, SOLUTION INTRAVENOUS CONTINUOUS PRN
Status: DISCONTINUED | OUTPATIENT
Start: 2024-04-12 | End: 2024-04-12

## 2024-04-12 RX ORDER — LIDOCAINE HYDROCHLORIDE 20 MG/ML
INJECTION, SOLUTION EPIDURAL; INFILTRATION; INTRACAUDAL; PERINEURAL
Status: DISCONTINUED | OUTPATIENT
Start: 2024-04-12 | End: 2024-04-12

## 2024-04-12 RX ORDER — SODIUM CHLORIDE 0.9 % (FLUSH) 0.9 %
10 SYRINGE (ML) INJECTION
Status: DISCONTINUED | OUTPATIENT
Start: 2024-04-12 | End: 2024-04-13 | Stop reason: HOSPADM

## 2024-04-12 RX ADMIN — PROPOFOL 50 MG: 10 INJECTION, EMULSION INTRAVENOUS at 12:04

## 2024-04-12 RX ADMIN — FENTANYL CITRATE 50 MCG: 50 INJECTION INTRAMUSCULAR; INTRAVENOUS at 12:04

## 2024-04-12 RX ADMIN — LIDOCAINE HYDROCHLORIDE 100 MG: 20 INJECTION, SOLUTION INTRAVENOUS at 12:04

## 2024-04-12 RX ADMIN — SODIUM CHLORIDE: 9 INJECTION, SOLUTION INTRAVENOUS at 12:04

## 2024-04-12 NOTE — DISCHARGE INSTRUCTIONS
Procedure Date  4/12/24     Impression  Overall Impression:   Favor anal fissure in posterior canal  Stricture  with patchy ulcers and erythema in the terminal ileum concerning for crohn's disease vs NSAID induced injury; performed cold forceps biopsy  The ileocecal valve, cecum, ascending colon, transverse colon, descending colon, sigmoid colon and rectum appeared normal. Performed random biopsy using biopsy forceps.  (grade 2) hemorrhoids        Recommendation    Await pathology results  Surveillance interval to be determined after pathology reviewed  If pathology is concerning for crohn's disease, may need MRI pelvis to evaluate for perianal disease  Start Nifedipine-lidocaine ointment to anal canal BID for 3 months - Rx provided   Will need follow up in clinic - will determine after pathology reviewed      Start a daily fiber supplement with Citrucel or Fibercon (can purchase at your local pharmacy)  Use Miralax 17 grams daily-to-twice daily as needed to have a regular, soft BM without straining  Drink at least 60-80 ounces of water daily unless you have a medical condition that requires fluid restriction      Outcome of procedure: successful Colonoscopy  Disposition: patient to recovery following procedure; discharge to home when appropriate parameters met  Provisions for follow up: please call my office for any unexpected symptoms like chest or abdominal pain or bleeding following your procedure.  Final Diagnosis: TI stricture      Indication  Screening for malignant neoplasm of colon

## 2024-04-12 NOTE — TRANSFER OF CARE
"Anesthesia Transfer of Care Note    Patient: Rosa Rutledge    Procedure(s) Performed: * No procedures listed *    Patient location: GI    Anesthesia Type: general    Transport from OR: Transported from OR on room air with adequate spontaneous ventilation    Post pain: adequate analgesia    Post assessment: no apparent anesthetic complications    Post vital signs: stable    Level of consciousness: responds to stimulation    Nausea/Vomiting: no nausea/vomiting    Complications: none    Transfer of care protocol was followed      Last vitals: Visit Vitals  BP (!) 105/43 (BP Location: Left arm, Patient Position: Lying)   Pulse 78   Temp 36.1 °C (97 °F) (Oral)   Resp 16   Ht 5' 2" (1.575 m)   Wt 108 kg (238 lb)   SpO2 99%   Breastfeeding No   BMI 43.53 kg/m²     "

## 2024-04-12 NOTE — H&P
Gastroenterology Pre-procedure H&P    History of Present Illness    Rosa Rutledge is a 43 y.o. female that  has a past medical history of Asthma, COVID, and Hypertension.     Patient here for routine index screening     Patient denies wt loss, abdominal pain, diarrhea, melena/hematochezia, change in stool caliber, no anticoagulants, FMHx of GI related malignancies.      Past Medical History:   Diagnosis Date    Asthma     COVID     Hypertension        Past Surgical History:   Procedure Laterality Date    ADENOIDECTOMY      BREAST SURGERY Bilateral     reduction    CHOLECYSTECTOMY  11/12/2020    HYSTERECTOMY  2016    Full    REDUCTION OF BOTH BREASTS  01/09/2020    TONSILLECTOMY      TOTAL REDUCTION MAMMOPLASTY      TUBAL LIGATION         Family History   Problem Relation Age of Onset    Diabetes Mother     Hypertension Mother     Diabetes Maternal Grandmother     Hypertension Maternal Grandmother     Asthma Maternal Grandmother     Asthma Sister     Hypertension Sister        Social History     Socioeconomic History    Marital status:     Number of children: 2   Occupational History    Occupation: [employer]      Employer: [employer]   Tobacco Use    Smoking status: Never    Smokeless tobacco: Never   Substance and Sexual Activity    Alcohol use: Never    Drug use: Never    Sexual activity: Yes     Partners: Male   Social History Narrative    Lives at home with  and second child (13)    No smoking in home , No smoking in home        Current Outpatient Medications   Medication Sig Dispense Refill    budesonide 180mcg (PULMICORT FLEXHALER) 180 mcg/actuation AePB Inhale 2 puffs into the lungs every 4 to 6 hours as needed.      ergocalciferol (ERGOCALCIFEROL) 50,000 unit Cap TAKE 1 CAPSULE BY MOUTH TWICE A WEEK 28 capsule 3    LINZESS 145 mcg Cap capsule Take 1 capsule (145 mcg total) by mouth before breakfast. 90 capsule 3    losartan (COZAAR) 50 MG tablet Take 1 tablet (50 mg total) by mouth once daily. 90 tablet 3  "   mv,calcium,min/iron/folic/vitK (ONE-A-DAY WOMEN'S COMPLETE ORAL) Take 1 tablet by mouth once daily.      NURTEC 75 mg odt DISSOLVE 1 TABLET ON THE TONGUE AT ONSET HEADACHE 16 tablet 2    potassium chloride (KLOR-CON) 10 MEQ TbSR Take 1 tablet (10 mEq total) by mouth once daily. 90 tablet 3    promethazine (PHENERGAN) 25 MG tablet TAKE 1 TABLET BY MOUTH EVERY 8 HOURS AS NEEDED FOR MIGRAINE 20 tablet 2    tiZANidine (ZANAFLEX) 4 MG tablet TAKE 1 TABLET BY MOUTH ONCE DAILY AT NIGHT AS NEEDED FOR MUSCLE SPASM      galcanezumab-gnlm (EMGALITY PEN) 120 mg/mL PnIj Inject 120 mg into the skin every 28 days. 1 mL 11    neomycin-polymyxin-hydrocortisone (CORTISPORIN) 3.5-10,000-1 mg/mL-unit/mL-% otic suspension Place 3 drops into the right ear 2 (two) times a day. 10 mL 0     No current facility-administered medications for this encounter.       Review of patient's allergies indicates:  No Known Allergies    Objective:  Vitals:    04/12/24 1026   BP: (!) 153/86   Pulse: 82   Resp: 10   SpO2: 98%   Weight: 108 kg (238 lb)   Height: 5' 2" (1.575 m)        GEN: normal appearing, NAD, AAO x3  HENT: NCAT, anicteric, OP benign  CV: normal rate, regular rhythm  RESP: NABS, symmetric rise, unlabored  ABD: soft, ND, no guarding or TTP  SKIN: warm and dry  NEURO: grossly afocal    Assessment and Plan:    Proceed with:    Colonoscopy for screening for colon cancer    Rolf Padilla MD  Gastroenterology  "

## 2024-04-12 NOTE — ANESTHESIA PREPROCEDURE EVALUATION
04/12/2024  Rosa Rutledge is a 43 y.o., female.    Social History     Socioeconomic History    Marital status:     Number of children: 2   Occupational History    Occupation: [employer]      Employer: [employer]   Tobacco Use    Smoking status: Never    Smokeless tobacco: Never   Substance and Sexual Activity    Alcohol use: Never    Drug use: Never    Sexual activity: Yes     Partners: Male   Social History Narrative    Lives at home with  and second child (13)    No smoking in home , No smoking in home       Pre-op Assessment    I have reviewed the Patient Summary Reports.     I have reviewed the Nursing Notes. I have reviewed the NPO Status.   I have reviewed the Medications.     Review of Systems  Anesthesia Hx:  No problems with previous Anesthesia                Social:  Non-Smoker       Cardiovascular:     Hypertension                                        Pulmonary:    Asthma                    Neurological:    Neuromuscular Disease,  Headaches                                   Past Medical History:   Diagnosis Date    Asthma     COVID     Hypertension       Past Medical History:   Diagnosis Date    Asthma     COVID     Hypertension       Past Surgical History:   Procedure Laterality Date    ADENOIDECTOMY      BREAST SURGERY Bilateral     reduction    CHOLECYSTECTOMY  11/12/2020    HYSTERECTOMY  2016    Full    REDUCTION OF BOTH BREASTS  01/09/2020    TONSILLECTOMY      TOTAL REDUCTION MAMMOPLASTY      TUBAL LIGATION          Physical Exam  General: Well nourished, Cooperative, Alert and Oriented    Airway:  Mallampati: II     Chest/Lungs:  Clear to auscultation    Heart:  Rate: Normal        Anesthesia Plan  Type of Anesthesia, risks & benefits discussed:    Anesthesia Type: Gen Natural Airway, MAC  Intra-op Monitoring Plan: Standard ASA Monitors  Post Op Pain Control Plan: multimodal analgesia  and IV/PO Opioids PRN  Induction:  IV  Informed Consent: Informed consent signed with the Patient and all parties understand the risks and agree with anesthesia plan.  All questions answered. Patient consented to blood products? Yes  ASA Score: 3  Day of Surgery Review of History & Physical: I have interviewed and examined the patient. I have reviewed the patient's H&P dated: There are no significant changes.     Ready For Surgery From Anesthesia Perspective.     .

## 2024-04-12 NOTE — ANESTHESIA POSTPROCEDURE EVALUATION
Anesthesia Post Evaluation    Patient: Rosa Rutledge    Procedure(s) Performed: * No procedures listed *    Final Anesthesia Type: general      Patient location during evaluation: PACU  Patient participation: Yes- Able to Participate  Level of consciousness: responds to stimulation  Post-procedure vital signs: reviewed and stable  Pain management: adequate  Airway patency: patent  ADDIE mitigation strategies: Multimodal analgesia  PONV status at discharge: No PONV  Anesthetic complications: no      Cardiovascular status: hemodynamically stable  Respiratory status: unassisted, spontaneous ventilation and room air  Hydration status: euvolemic  Follow-up not needed.  Comments: The pt was not arousable even with painful stimulation during the procedure.   Refer to nursing note for pain/chelsea score upon discharge from recovery.               Vitals Value Taken Time   /43 04/12/24 1247   Temp 36.1 °C (97 °F) 04/12/24 1247   Pulse 83 04/12/24 1248   Resp 22 04/12/24 1248   SpO2 100 % 04/12/24 1248   Vitals shown include unvalidated device data.      No case tracking events are documented in the log.      Pain/Chelsea Score: Chelsea Score: 8 (4/12/2024 12:45 PM)

## 2024-04-15 LAB
DHEA SERPL-MCNC: NORMAL
ESTROGEN SERPL-MCNC: NORMAL PG/ML
INSULIN SERPL-ACNC: NORMAL U[IU]/ML
LAB AP GROSS DESCRIPTION: NORMAL
LAB AP LABORATORY NOTES: NORMAL
T3RU NFR SERPL: NORMAL %

## 2024-04-17 DIAGNOSIS — K63.3 EROSION OF TERMINAL ILEUM: Primary | ICD-10-CM

## 2024-05-14 ENCOUNTER — PATIENT MESSAGE (OUTPATIENT)
Dept: PRIMARY CARE CLINIC | Facility: CLINIC | Age: 44
End: 2024-05-14
Payer: COMMERCIAL

## 2024-05-14 RX ORDER — NITROFURANTOIN 25; 75 MG/1; MG/1
100 CAPSULE ORAL 2 TIMES DAILY
Qty: 10 CAPSULE | Refills: 0 | Status: SHIPPED | OUTPATIENT
Start: 2024-05-14 | End: 2024-05-19

## 2024-05-15 ENCOUNTER — HOSPITAL ENCOUNTER (EMERGENCY)
Facility: HOSPITAL | Age: 44
Discharge: HOME | End: 2024-05-15
Attending: FAMILY MEDICINE
Payer: COMMERCIAL

## 2024-05-15 VITALS
HEART RATE: 120 BPM | SYSTOLIC BLOOD PRESSURE: 120 MMHG | HEIGHT: 61 IN | TEMPERATURE: 100 F | DIASTOLIC BLOOD PRESSURE: 90 MMHG | WEIGHT: 260 LBS | RESPIRATION RATE: 20 BRPM | BODY MASS INDEX: 49.09 KG/M2 | OXYGEN SATURATION: 96 %

## 2024-05-15 DIAGNOSIS — N20.0 KIDNEY STONE: Primary | ICD-10-CM

## 2024-05-15 DIAGNOSIS — R10.9 FLANK PAIN: ICD-10-CM

## 2024-05-15 LAB
ALBUMIN SERPL BCP-MCNC: 3 G/DL (ref 3.5–5)
ALBUMIN/GLOB SERPL: 0.7 {RATIO}
ALP SERPL-CCNC: 89 U/L (ref 39–100)
ALT SERPL W P-5'-P-CCNC: 32 U/L (ref 13–56)
ANION GAP SERPL CALCULATED.3IONS-SCNC: 11 MMOL/L (ref 7–16)
AST SERPL W P-5'-P-CCNC: 29 U/L (ref 15–37)
BACTERIA #/AREA URNS HPF: ABNORMAL /HPF
BASOPHILS # BLD AUTO: 0.04 K/UL (ref 0–0.2)
BASOPHILS NFR BLD AUTO: 0.2 % (ref 0–1)
BILIRUB SERPL-MCNC: 0.5 MG/DL (ref ?–1.2)
BILIRUB UR QL STRIP: NEGATIVE
BUN SERPL-MCNC: 7 MG/DL (ref 7–18)
BUN/CREAT SERPL: 12 (ref 6–20)
CALCIUM SERPL-MCNC: 9 MG/DL (ref 8.5–10.1)
CHLORIDE SERPL-SCNC: 104 MMOL/L (ref 98–107)
CLARITY UR: ABNORMAL
CO2 SERPL-SCNC: 22 MMOL/L (ref 21–32)
COLOR UR: ABNORMAL
CREAT SERPL-MCNC: 0.6 MG/DL (ref 0.55–1.02)
DIFFERENTIAL METHOD BLD: ABNORMAL
EGFR (NO RACE VARIABLE) (RUSH/TITUS): 113 ML/MIN/1.73M2
EOSINOPHIL # BLD AUTO: 0 K/UL (ref 0–0.5)
EOSINOPHIL NFR BLD AUTO: 0 % (ref 1–4)
ERYTHROCYTE [DISTWIDTH] IN BLOOD BY AUTOMATED COUNT: 13.4 % (ref 11.5–14.5)
GLOBULIN SER-MCNC: 4.5 G/DL (ref 2–4)
GLUCOSE SERPL-MCNC: 99 MG/DL (ref 74–106)
GLUCOSE UR STRIP-MCNC: NORMAL MG/DL
HCT VFR BLD AUTO: 36.9 % (ref 38–47)
HGB BLD-MCNC: 11.1 G/DL (ref 12–16)
IMM GRANULOCYTES # BLD AUTO: 0.09 K/UL (ref 0–0.04)
IMM GRANULOCYTES NFR BLD: 0.5 % (ref 0–0.4)
KETONES UR STRIP-SCNC: NEGATIVE MG/DL
LEUKOCYTE ESTERASE UR QL STRIP: ABNORMAL
LYMPHOCYTES # BLD AUTO: 1.3 K/UL (ref 1–4.8)
LYMPHOCYTES NFR BLD AUTO: 7.7 % (ref 27–41)
MCH RBC QN AUTO: 24.7 PG (ref 27–31)
MCHC RBC AUTO-ENTMCNC: 30.1 G/DL (ref 32–36)
MCV RBC AUTO: 82.2 FL (ref 80–96)
MONOCYTES # BLD AUTO: 1.18 K/UL (ref 0–0.8)
MONOCYTES NFR BLD AUTO: 7 % (ref 2–6)
MPC BLD CALC-MCNC: 11.2 FL (ref 9.4–12.4)
NEUTROPHILS # BLD AUTO: 14.26 K/UL (ref 1.8–7.7)
NEUTROPHILS NFR BLD AUTO: 84.6 % (ref 53–65)
NITRITE UR QL STRIP: NEGATIVE
NRBC # BLD AUTO: 0 X10E3/UL
NRBC, AUTO (.00): 0 %
PH UR STRIP: 5.5 PH UNITS
PLATELET # BLD AUTO: 349 K/UL (ref 150–400)
POTASSIUM SERPL-SCNC: 3.1 MMOL/L (ref 3.5–5.1)
PROT SERPL-MCNC: 7.5 G/DL (ref 6.4–8.2)
PROT UR QL STRIP: 20
RBC # BLD AUTO: 4.49 M/UL (ref 4.2–5.4)
RBC # UR STRIP: ABNORMAL /UL
RBC #/AREA URNS HPF: 2 /HPF
SODIUM SERPL-SCNC: 134 MMOL/L (ref 136–145)
SP GR UR STRIP: 1.01
SQUAMOUS #/AREA URNS LPF: ABNORMAL /HPF
UROBILINOGEN UR STRIP-ACNC: NORMAL MG/DL
WBC # BLD AUTO: 16.87 K/UL (ref 4.5–11)
WBC #/AREA URNS HPF: 39 /HPF

## 2024-05-15 PROCEDURE — 81003 URINALYSIS AUTO W/O SCOPE: CPT | Performed by: FAMILY MEDICINE

## 2024-05-15 PROCEDURE — 80053 COMPREHEN METABOLIC PANEL: CPT | Performed by: FAMILY MEDICINE

## 2024-05-15 PROCEDURE — 36415 COLL VENOUS BLD VENIPUNCTURE: CPT | Performed by: FAMILY MEDICINE

## 2024-05-15 PROCEDURE — 81001 URINALYSIS AUTO W/SCOPE: CPT | Performed by: FAMILY MEDICINE

## 2024-05-15 PROCEDURE — 85025 COMPLETE CBC W/AUTO DIFF WBC: CPT | Performed by: FAMILY MEDICINE

## 2024-05-15 PROCEDURE — 87086 URINE CULTURE/COLONY COUNT: CPT | Performed by: FAMILY MEDICINE

## 2024-05-15 PROCEDURE — 96361 HYDRATE IV INFUSION ADD-ON: CPT

## 2024-05-15 PROCEDURE — 63600175 PHARM REV CODE 636 W HCPCS: Performed by: FAMILY MEDICINE

## 2024-05-15 PROCEDURE — 99285 EMERGENCY DEPT VISIT HI MDM: CPT | Mod: 25

## 2024-05-15 PROCEDURE — 25000003 PHARM REV CODE 250: Performed by: FAMILY MEDICINE

## 2024-05-15 PROCEDURE — 96374 THER/PROPH/DIAG INJ IV PUSH: CPT

## 2024-05-15 PROCEDURE — 96375 TX/PRO/DX INJ NEW DRUG ADDON: CPT

## 2024-05-15 RX ORDER — KETOROLAC TROMETHAMINE 30 MG/ML
30 INJECTION, SOLUTION INTRAMUSCULAR; INTRAVENOUS
Status: COMPLETED | OUTPATIENT
Start: 2024-05-15 | End: 2024-05-15

## 2024-05-15 RX ORDER — MORPHINE SULFATE 4 MG/ML
4 INJECTION, SOLUTION INTRAMUSCULAR; INTRAVENOUS
Status: COMPLETED | OUTPATIENT
Start: 2024-05-15 | End: 2024-05-15

## 2024-05-15 RX ORDER — PROMETHAZINE HYDROCHLORIDE 25 MG/1
25 TABLET ORAL EVERY 6 HOURS PRN
Qty: 15 TABLET | Refills: 0 | Status: ON HOLD | OUTPATIENT
Start: 2024-05-15 | End: 2024-05-16

## 2024-05-15 RX ORDER — HYDROMORPHONE HYDROCHLORIDE 2 MG/1
2 TABLET ORAL EVERY 4 HOURS PRN
Qty: 18 TABLET | Refills: 0 | Status: SHIPPED | OUTPATIENT
Start: 2024-05-15

## 2024-05-15 RX ORDER — SULFAMETHOXAZOLE AND TRIMETHOPRIM 800; 160 MG/1; MG/1
1 TABLET ORAL 2 TIMES DAILY
Qty: 14 TABLET | Refills: 0 | Status: SHIPPED | OUTPATIENT
Start: 2024-05-15 | End: 2024-05-22

## 2024-05-15 RX ORDER — ONDANSETRON HYDROCHLORIDE 2 MG/ML
4 INJECTION, SOLUTION INTRAVENOUS ONCE
Status: COMPLETED | OUTPATIENT
Start: 2024-05-15 | End: 2024-05-15

## 2024-05-15 RX ADMIN — KETOROLAC TROMETHAMINE 30 MG: 30 INJECTION, SOLUTION INTRAMUSCULAR at 12:05

## 2024-05-15 RX ADMIN — MORPHINE SULFATE 4 MG: 4 INJECTION INTRAVENOUS at 12:05

## 2024-05-15 RX ADMIN — ONDANSETRON 4 MG: 2 INJECTION INTRAMUSCULAR; INTRAVENOUS at 12:05

## 2024-05-15 RX ADMIN — SODIUM CHLORIDE 1000 ML: 9 INJECTION, SOLUTION INTRAVENOUS at 12:05

## 2024-05-15 NOTE — Clinical Note
"Rosa"Tam Rutledge was seen and treated in our emergency department on 5/15/2024.  She may return to work on 05/20/2024.       If you have any questions or concerns, please don't hesitate to call.      Jay Snyder, DO"

## 2024-05-15 NOTE — ED PROVIDER NOTES
Encounter Date: 5/15/2024    SCRIBE #1 NOTE: I, Mirella Yoder, am scribing for, and in the presence of,  Jay Snyder DO. I have scribed the entire note.       History     Chief Complaint   Patient presents with    Flank Pain     Right    Abdominal Pain     RLQ pain     This is a 42 y/o female,who presents to the ED with complaints of RLQ pain. She states she believes she is dehydrated and has a UTI. There is no Hx of hematuria, dysuria, nausea or vomiting. She denies a known hx of kidney stones. There are no other complaints/pain in the ED at this time. She has a known hx of HTN.     The history is provided by the patient. No  was used.     Review of patient's allergies indicates:  No Known Allergies  Past Medical History:   Diagnosis Date    Asthma     COVID     Hypertension     Kidney stone      Past Surgical History:   Procedure Laterality Date    ADENOIDECTOMY      BREAST SURGERY Bilateral     reduction    CHOLECYSTECTOMY  11/12/2020    CYSTOURETEROSCOPY, WITH HOLMIUM LASER LITHOTRIPSY OF URETERAL CALCULUS AND STENT INSERTION Right 5/16/2024    Procedure: CYSTOURETEROSCOPY WITH HOLMIUM LASER LITHOTRIPSY OF URETERAL CALCULUS, STENT INSERTION AND INDICATED PROCEDURES;  Surgeon: Luc Howard Jr., MD;  Location: [facility], [place];  Service: Urology;  Laterality: Right;    HYSTERECTOMY  2016    Full    REDUCTION OF BOTH BREASTS  01/09/2020    TONSILLECTOMY      TOTAL REDUCTION MAMMOPLASTY      TUBAL LIGATION       Family History   Problem Relation Name Age of Onset    Diabetes Mother      Hypertension Mother      Diabetes Maternal Grandmother      Hypertension Maternal Grandmother      Asthma Maternal Grandmother      Asthma Sister      Hypertension Sister       Social History     Tobacco Use    Smoking status: Never    Smokeless tobacco: Never   Substance Use Topics    Alcohol use: Never    Drug use: Never     Review of Systems   Gastrointestinal:  Positive for abdominal pain (RLE  pain.) and nausea. Negative for vomiting.   Genitourinary:  Negative for dysuria and hematuria.   All other systems reviewed and are negative.      Physical Exam     Initial Vitals [05/15/24 1136]   BP Pulse Resp Temp SpO2   (!) 120/90 (!) 120 20 100.2 °F (37.9 °C) 96 %      MAP       --         Physical Exam    Nursing note and vitals reviewed.  Constitutional: She appears well-developed and well-nourished.   HENT:   Head: Normocephalic and atraumatic.   Eyes: Conjunctivae and EOM are normal. Pupils are equal, round, and reactive to light.   Neck: Neck supple.   Normal range of motion.  Cardiovascular:  Normal rate, regular rhythm and normal heart sounds.           Pulmonary/Chest: Breath sounds normal. No respiratory distress.   Abdominal: Abdomen is soft. Bowel sounds are normal. There is no abdominal tenderness.   Musculoskeletal:         General: No tenderness. Normal range of motion.      Cervical back: Normal range of motion and neck supple.     Neurological: She is alert and oriented to person, place, and time.   Skin: Skin is warm and dry.   Psychiatric: She has a normal mood and affect.         ED Course   Procedures  Labs Reviewed   COMPREHENSIVE METABOLIC PANEL - Abnormal; Notable for the following components:       Result Value    Sodium 134 (*)     Potassium 3.1 (*)     Albumin 3.0 (*)     Globulin 4.5 (*)     All other components within normal limits   URINALYSIS - Abnormal; Notable for the following components:    Leukocytes, UA Large (*)     Protein, UA 20 (*)     Blood, UA Small (*)     All other components within normal limits   CBC WITH DIFFERENTIAL - Abnormal; Notable for the following components:    WBC 16.87 (*)     Hemoglobin 11.1 (*)     Hematocrit 36.9 (*)     MCH 24.7 (*)     MCHC 30.1 (*)     Neutrophils % 84.6 (*)     Lymphocytes % 7.7 (*)     Monocytes % 7.0 (*)     Eosinophils % 0.0 (*)     Immature Granulocytes % 0.5 (*)     Neutrophils, Abs 14.26 (*)     Monocytes, Absolute 1.18 (*)      Immature Granulocytes, Absolute 0.09 (*)     All other components within normal limits   URINALYSIS, MICROSCOPIC - Abnormal; Notable for the following components:    WBC, UA 39 (*)     Bacteria, UA Many (*)     Squamous Epithelial Cells, UA Occasional (*)     All other components within normal limits   CULTURE, URINE   CBC W/ AUTO DIFFERENTIAL    Narrative:     The following orders were created for panel order CBC Auto Differential.  Procedure                               Abnormality         Status                     ---------                               -----------         ------                     CBC with Differential[[id number]]       Abnormal            Final result                 Please view results for these tests on the individual orders.          Imaging Results              CT Renal Stone Study Abd Pelvis WO (Final result)  Result time 05/15/24 14:32:53      Final result by Huy Bhakta DO (05/15/24 14:32:53)                   Impression:      mm calculus within the distal right ureter with mild right-sided hydronephrosis and hydroureter. There is mild right-sided perinephric and periureteral fat stranding.  There is scattered fluid within the stomach, small bowel, and large bowel which can be seen gastroenteritis/diarrhea causing illness.  Prior hysterectomy.  Other/detailed findings as above.    The CT exam was performed using one or more of the following dose    reduction techniques- Automated exposure control, adjustment of the mA    and/or kV according to patient size, and/or use of iterative    reconstructed technique.    Point of Service: [facility]      Electronically signed by: Huy Bhakta  Date:    05/15/2024  Time:    14:32               Narrative:    EXAMINATION:  CT RENAL STONE STUDY ABD PELVIS WO    CLINICAL HISTORY:  Nephrolithiasis, uncomplicated;    COMPARISON:  None    TECHNIQUE:  Multiple axial tomographic images of the abdomen and pelvis were obtained without  the use of intravenous contrast.    FINDINGS:  Lung bases clear.    No worrisome focal hepatic abnormality demonstrated on submitted images.  Prior cholecystectomy.  Visualized pancreas appears unremarkable.  Spleen grossly unremarkable.    Bilateral adrenal glands grossly unremarkable.  7 mm calculus within the distal right ureter with mild right-sided hydronephrosis and hydroureter.  There is mild right-sided perinephric and periureteral fat stranding.  Prior hysterectomy.  Urinary bladder incompletely distended.    There is scattered fluid within the stomach, small bowel, and large bowel which can be seen gastroenteritis/diarrhea causing illness.  No convincing evidence of gastrointestinal obstruction or acute appendicitis.  Vasculature grossly unremarkable.  Scattered skeletal degenerative change.                                       Medications   sodium chloride 0.9% bolus 1,000 mL 1,000 mL (0 mLs Intravenous Stopped 5/15/24 1359)   ketorolac injection 30 mg (30 mg Intravenous Given 5/15/24 1257)   morphine injection 4 mg (4 mg Intravenous Given 5/15/24 1257)   ondansetron injection 4 mg (4 mg Intravenous Given 5/15/24 1257)     Medical Decision Making  Amount and/or Complexity of Data Reviewed  Labs: ordered.  Radiology: ordered.    Risk  Prescription drug management.              Attending Attestation:           Physician Attestation for Scribe:  Physician Attestation Statement for Scribe #1: I, Jay Snyder, DO, reviewed documentation, as scribed by Mirella Yoder in my presence, and it is both accurate and complete.                        Medical Decision Making:   Initial Assessment:   This is a 42 y/o female,who presents to the ED with complaints of RLQ pain. She states she believes she is dehydrated and has a UTI. There is no Hx of hematuria, dysuria, nausea or vomiting. She denies a known hx of kidney stones. There are no other complaints/pain in the ED at this time. She has a known hx of HTN.      The history is provided by the patient. No  was used.     Differential Diagnosis:   7 mm right renal stone in the ureter.  Hydronephrosis  ED Management:  Discussed case with Dr. Howard he explained to give her pain medicine nausea medicine and have her drink plenty of fluids.  It the pain gets worse she is to go in the morning to see Hema   in the clinic for re-evaluation and treatment.  Otherwise issues she should urinate through a strainer if she catches the stone bring it to the office within 1 week appointment was made with Krista for the patient to see Hema in 1 week the number [phone]             Clinical Impression:  Final diagnoses:  [R10.9] Flank pain  [N20.0] Kidney stone (Primary)          ED Disposition Condition    Discharge Stable          ED Prescriptions       Medication Sig Dispense Start Date End Date Auth. Provider    HYDROmorphone (DILAUDID) 2 MG tablet Take 1 tablet (2 mg total) by mouth every 4 (four) hours as needed for Pain. 18 tablet 5/15/2024 -- Jay Snyder,     promethazine (PHENERGAN) 25 MG tablet () Take 1 tablet (25 mg total) by mouth every 6 (six) hours as needed for Nausea. 15 tablet 5/15/2024 2024 Jay Snyder,     sulfamethoxazole-trimethoprim 800-160mg (BACTRIM DS) 800-160 mg Tab () Take 1 tablet by mouth 2 (two) times daily. for 7 days 14 tablet 5/15/2024 2024 Jay Snyder,           Follow-up Information    None          Jay Snyder,   [id number]

## 2024-05-15 NOTE — ED TRIAGE NOTES
Patient arrives to ED with complaints of right sided flank pain and RLQ abdominal pain since last night. Patient states recently being diagnosed with chron's disease and denies any dysuria.

## 2024-05-16 ENCOUNTER — OFFICE VISIT (OUTPATIENT)
Dept: UROLOGY | Facility: CLINIC | Age: 44
End: 2024-05-16
Payer: COMMERCIAL

## 2024-05-16 ENCOUNTER — HOSPITAL ENCOUNTER (OUTPATIENT)
Facility: HOSPITAL | Age: 44
Discharge: HOME | End: 2024-05-16
Attending: UROLOGY | Admitting: UROLOGY
Payer: COMMERCIAL

## 2024-05-16 ENCOUNTER — TELEPHONE (OUTPATIENT)
Dept: UROLOGY | Facility: CLINIC | Age: 44
End: 2024-05-16
Payer: COMMERCIAL

## 2024-05-16 ENCOUNTER — ANESTHESIA EVENT (OUTPATIENT)
Dept: SURGERY | Facility: HOSPITAL | Age: 44
End: 2024-05-16
Payer: COMMERCIAL

## 2024-05-16 ENCOUNTER — ANESTHESIA (OUTPATIENT)
Dept: SURGERY | Facility: HOSPITAL | Age: 44
End: 2024-05-16
Payer: COMMERCIAL

## 2024-05-16 VITALS
WEIGHT: 273 LBS | HEIGHT: 61 IN | RESPIRATION RATE: 16 BRPM | OXYGEN SATURATION: 95 % | HEART RATE: 104 BPM | BODY MASS INDEX: 51.54 KG/M2 | DIASTOLIC BLOOD PRESSURE: 65 MMHG | SYSTOLIC BLOOD PRESSURE: 133 MMHG | TEMPERATURE: 100 F

## 2024-05-16 VITALS
TEMPERATURE: 99 F | WEIGHT: 273 LBS | DIASTOLIC BLOOD PRESSURE: 82 MMHG | HEIGHT: 61 IN | SYSTOLIC BLOOD PRESSURE: 141 MMHG | BODY MASS INDEX: 51.54 KG/M2 | HEART RATE: 115 BPM | OXYGEN SATURATION: 100 %

## 2024-05-16 VITALS — HEART RATE: 102 BPM | RESPIRATION RATE: 23 BRPM | OXYGEN SATURATION: 100 %

## 2024-05-16 DIAGNOSIS — N20.1 RIGHT URETERAL STONE: Primary | ICD-10-CM

## 2024-05-16 DIAGNOSIS — Z01.818 PREOPERATIVE EVALUATION OF A MEDICAL CONDITION TO RULE OUT SURGICAL CONTRAINDICATIONS (TAR REQUIRED): ICD-10-CM

## 2024-05-16 DIAGNOSIS — R11.0 NAUSEA: ICD-10-CM

## 2024-05-16 DIAGNOSIS — N20.0 KIDNEY STONE: ICD-10-CM

## 2024-05-16 DIAGNOSIS — N20.0 KIDNEY STONES: Primary | ICD-10-CM

## 2024-05-16 DIAGNOSIS — N23 URETERAL COLIC: ICD-10-CM

## 2024-05-16 PROCEDURE — 63600175 PHARM REV CODE 636 W HCPCS: Performed by: ANESTHESIOLOGY

## 2024-05-16 PROCEDURE — 63600175 PHARM REV CODE 636 W HCPCS: Performed by: NURSE ANESTHETIST, CERTIFIED REGISTERED

## 2024-05-16 PROCEDURE — 96372 THER/PROPH/DIAG INJ SC/IM: CPT | Mod: PBBFAC | Performed by: UROLOGY

## 2024-05-16 PROCEDURE — 99215 OFFICE O/P EST HI 40 MIN: CPT | Mod: PBBFAC | Performed by: UROLOGY

## 2024-05-16 PROCEDURE — 4010F ACE/ARB THERAPY RXD/TAKEN: CPT | Mod: ,,, | Performed by: UROLOGY

## 2024-05-16 PROCEDURE — 37000008 HC ANESTHESIA 1ST 15 MINUTES: Performed by: UROLOGY

## 2024-05-16 PROCEDURE — 52356 CYSTO/URETERO W/LITHOTRIPSY: CPT | Mod: RT,,, | Performed by: UROLOGY

## 2024-05-16 PROCEDURE — 27201423 OPTIME MED/SURG SUP & DEVICES STERILE SUPPLY: Performed by: UROLOGY

## 2024-05-16 PROCEDURE — D9220A PRA ANESTHESIA: Mod: ANES,,, | Performed by: ANESTHESIOLOGY

## 2024-05-16 PROCEDURE — 3077F SYST BP >= 140 MM HG: CPT | Mod: ,,, | Performed by: UROLOGY

## 2024-05-16 PROCEDURE — 99214 OFFICE O/P EST MOD 30 MIN: CPT | Mod: S$PBB,25,, | Performed by: UROLOGY

## 2024-05-16 PROCEDURE — 36000706: Performed by: UROLOGY

## 2024-05-16 PROCEDURE — C1758 CATHETER, URETERAL: HCPCS | Performed by: UROLOGY

## 2024-05-16 PROCEDURE — 27000177 HC AIRWAY, LARYNGEAL MASK: Performed by: NURSE ANESTHETIST, CERTIFIED REGISTERED

## 2024-05-16 PROCEDURE — 36000707: Performed by: UROLOGY

## 2024-05-16 PROCEDURE — C2617 STENT, NON-COR, TEM W/O DEL: HCPCS | Performed by: UROLOGY

## 2024-05-16 PROCEDURE — 71000015 HC POSTOP RECOV 1ST HR: Performed by: UROLOGY

## 2024-05-16 PROCEDURE — 99999PBSHW PR PBB SHADOW TECHNICAL ONLY FILED TO HB: Mod: PBBFAC,,,

## 2024-05-16 PROCEDURE — C1769 GUIDE WIRE: HCPCS | Performed by: UROLOGY

## 2024-05-16 PROCEDURE — 25000003 PHARM REV CODE 250: Performed by: NURSE ANESTHETIST, CERTIFIED REGISTERED

## 2024-05-16 PROCEDURE — 93010 ELECTROCARDIOGRAM REPORT: CPT | Mod: ,,, | Performed by: STUDENT IN AN ORGANIZED HEALTH CARE EDUCATION/TRAINING PROGRAM

## 2024-05-16 PROCEDURE — 71000033 HC RECOVERY, INTIAL HOUR: Performed by: UROLOGY

## 2024-05-16 PROCEDURE — 27000510 HC BLANKET BAIR HUGGER ANY SIZE: Performed by: NURSE ANESTHETIST, CERTIFIED REGISTERED

## 2024-05-16 PROCEDURE — 1159F MED LIST DOCD IN RCRD: CPT | Mod: ,,, | Performed by: UROLOGY

## 2024-05-16 PROCEDURE — 3079F DIAST BP 80-89 MM HG: CPT | Mod: ,,, | Performed by: UROLOGY

## 2024-05-16 PROCEDURE — 71000016 HC POSTOP RECOV ADDL HR: Performed by: UROLOGY

## 2024-05-16 PROCEDURE — 37000009 HC ANESTHESIA EA ADD 15 MINS: Performed by: UROLOGY

## 2024-05-16 PROCEDURE — 63600175 PHARM REV CODE 636 W HCPCS

## 2024-05-16 PROCEDURE — 3008F BODY MASS INDEX DOCD: CPT | Mod: ,,, | Performed by: UROLOGY

## 2024-05-16 PROCEDURE — 93005 ELECTROCARDIOGRAM TRACING: CPT

## 2024-05-16 PROCEDURE — 27000716 HC OXISENSOR PROBE, ANY SIZE: Performed by: NURSE ANESTHETIST, CERTIFIED REGISTERED

## 2024-05-16 PROCEDURE — 1160F RVW MEDS BY RX/DR IN RCRD: CPT | Mod: ,,, | Performed by: UROLOGY

## 2024-05-16 PROCEDURE — D9220A PRA ANESTHESIA: Mod: CRNA,,, | Performed by: NURSE ANESTHETIST, CERTIFIED REGISTERED

## 2024-05-16 DEVICE — VARIABLE LENGTH URETERAL STENT
Type: IMPLANTABLE DEVICE | Site: URETER | Status: FUNCTIONAL
Brand: CONTOUR VL™

## 2024-05-16 RX ORDER — IPRATROPIUM BROMIDE AND ALBUTEROL SULFATE 2.5; .5 MG/3ML; MG/3ML
3 SOLUTION RESPIRATORY (INHALATION) ONCE AS NEEDED
Status: DISCONTINUED | OUTPATIENT
Start: 2024-05-16 | End: 2024-05-16

## 2024-05-16 RX ORDER — LIDOCAINE HYDROCHLORIDE 20 MG/ML
INJECTION, SOLUTION EPIDURAL; INFILTRATION; INTRACAUDAL; PERINEURAL
Status: DISCONTINUED | OUTPATIENT
Start: 2024-05-16 | End: 2024-05-16

## 2024-05-16 RX ORDER — HYDROMORPHONE HYDROCHLORIDE 2 MG/ML
0.5 INJECTION, SOLUTION INTRAMUSCULAR; INTRAVENOUS; SUBCUTANEOUS ONCE
Status: COMPLETED | OUTPATIENT
Start: 2024-05-16 | End: 2024-05-16

## 2024-05-16 RX ORDER — ONDANSETRON HYDROCHLORIDE 2 MG/ML
INJECTION, SOLUTION INTRAVENOUS
Status: DISCONTINUED | OUTPATIENT
Start: 2024-05-16 | End: 2024-05-16

## 2024-05-16 RX ORDER — HYDROMORPHONE HYDROCHLORIDE 2 MG/1
2 TABLET ORAL EVERY 4 HOURS PRN
Qty: 12 TABLET | Refills: 0 | Status: SHIPPED | OUTPATIENT
Start: 2024-05-16

## 2024-05-16 RX ORDER — HYDROMORPHONE HYDROCHLORIDE 2 MG/ML
0.5 INJECTION, SOLUTION INTRAMUSCULAR; INTRAVENOUS; SUBCUTANEOUS EVERY 5 MIN PRN
Status: DISCONTINUED | OUTPATIENT
Start: 2024-05-16 | End: 2024-05-16 | Stop reason: HOSPADM

## 2024-05-16 RX ORDER — DIPHENHYDRAMINE HYDROCHLORIDE 50 MG/ML
25 INJECTION INTRAMUSCULAR; INTRAVENOUS EVERY 6 HOURS PRN
Status: DISCONTINUED | OUTPATIENT
Start: 2024-05-16 | End: 2024-05-16 | Stop reason: HOSPADM

## 2024-05-16 RX ORDER — HYDROMORPHONE HYDROCHLORIDE 2 MG/ML
0.5 INJECTION, SOLUTION INTRAMUSCULAR; INTRAVENOUS; SUBCUTANEOUS EVERY 5 MIN PRN
Status: COMPLETED | OUTPATIENT
Start: 2024-05-16 | End: 2024-05-16

## 2024-05-16 RX ORDER — SODIUM CHLORIDE, SODIUM LACTATE, POTASSIUM CHLORIDE, CALCIUM CHLORIDE 600; 310; 30; 20 MG/100ML; MG/100ML; MG/100ML; MG/100ML
INJECTION, SOLUTION INTRAVENOUS CONTINUOUS
Status: DISCONTINUED | OUTPATIENT
Start: 2024-05-16 | End: 2024-05-16 | Stop reason: HOSPADM

## 2024-05-16 RX ORDER — MEPERIDINE HYDROCHLORIDE 25 MG/ML
25 INJECTION INTRAMUSCULAR; INTRAVENOUS; SUBCUTANEOUS ONCE AS NEEDED
Status: DISCONTINUED | OUTPATIENT
Start: 2024-05-16 | End: 2024-05-16

## 2024-05-16 RX ORDER — ONDANSETRON HYDROCHLORIDE 2 MG/ML
4 INJECTION, SOLUTION INTRAVENOUS DAILY PRN
Status: DISCONTINUED | OUTPATIENT
Start: 2024-05-16 | End: 2024-05-16

## 2024-05-16 RX ORDER — PROPOFOL 10 MG/ML
VIAL (ML) INTRAVENOUS
Status: DISCONTINUED | OUTPATIENT
Start: 2024-05-16 | End: 2024-05-16

## 2024-05-16 RX ORDER — SODIUM CHLORIDE, SODIUM LACTATE, POTASSIUM CHLORIDE, CALCIUM CHLORIDE 600; 310; 30; 20 MG/100ML; MG/100ML; MG/100ML; MG/100ML
INJECTION, SOLUTION INTRAVENOUS
Status: COMPLETED
Start: 2024-05-16 | End: 2024-05-16

## 2024-05-16 RX ORDER — ONDANSETRON HYDROCHLORIDE 2 MG/ML
4 INJECTION, SOLUTION INTRAVENOUS DAILY PRN
Status: DISCONTINUED | OUTPATIENT
Start: 2024-05-16 | End: 2024-05-16 | Stop reason: HOSPADM

## 2024-05-16 RX ORDER — CEFAZOLIN SODIUM 1 G/3ML
INJECTION, POWDER, FOR SOLUTION INTRAMUSCULAR; INTRAVENOUS
Status: DISCONTINUED | OUTPATIENT
Start: 2024-05-16 | End: 2024-05-16

## 2024-05-16 RX ORDER — MORPHINE SULFATE 10 MG/ML
4 INJECTION INTRAMUSCULAR; INTRAVENOUS; SUBCUTANEOUS EVERY 5 MIN PRN
Status: DISCONTINUED | OUTPATIENT
Start: 2024-05-16 | End: 2024-05-16

## 2024-05-16 RX ORDER — FENTANYL CITRATE 50 UG/ML
INJECTION, SOLUTION INTRAMUSCULAR; INTRAVENOUS
Status: DISCONTINUED | OUTPATIENT
Start: 2024-05-16 | End: 2024-05-16

## 2024-05-16 RX ORDER — PROMETHAZINE HYDROCHLORIDE 25 MG/1
25 TABLET ORAL EVERY 4 HOURS PRN
Qty: 12 TABLET | Refills: 0 | Status: SHIPPED | OUTPATIENT
Start: 2024-05-16

## 2024-05-16 RX ORDER — PROMETHAZINE HYDROCHLORIDE 25 MG/ML
25 INJECTION, SOLUTION INTRAMUSCULAR; INTRAVENOUS
Status: COMPLETED | OUTPATIENT
Start: 2024-05-16 | End: 2024-05-16

## 2024-05-16 RX ORDER — HYDROMORPHONE HYDROCHLORIDE 2 MG/ML
2 INJECTION, SOLUTION INTRAMUSCULAR; INTRAVENOUS; SUBCUTANEOUS ONCE
Status: COMPLETED | OUTPATIENT
Start: 2024-05-16 | End: 2024-05-16

## 2024-05-16 RX ORDER — DIPHENHYDRAMINE HYDROCHLORIDE 50 MG/ML
25 INJECTION INTRAMUSCULAR; INTRAVENOUS EVERY 6 HOURS PRN
Status: DISCONTINUED | OUTPATIENT
Start: 2024-05-16 | End: 2024-05-16

## 2024-05-16 RX ORDER — MEPERIDINE HYDROCHLORIDE 25 MG/ML
25 INJECTION INTRAMUSCULAR; INTRAVENOUS; SUBCUTANEOUS ONCE AS NEEDED
Status: DISCONTINUED | OUTPATIENT
Start: 2024-05-16 | End: 2024-05-16 | Stop reason: HOSPADM

## 2024-05-16 RX ORDER — MORPHINE SULFATE 10 MG/ML
4 INJECTION INTRAMUSCULAR; INTRAVENOUS; SUBCUTANEOUS EVERY 5 MIN PRN
Status: DISCONTINUED | OUTPATIENT
Start: 2024-05-16 | End: 2024-05-16 | Stop reason: HOSPADM

## 2024-05-16 RX ORDER — SODIUM CHLORIDE, SODIUM LACTATE, POTASSIUM CHLORIDE, CALCIUM CHLORIDE 600; 310; 30; 20 MG/100ML; MG/100ML; MG/100ML; MG/100ML
INJECTION, SOLUTION INTRAVENOUS CONTINUOUS
Status: CANCELLED | OUTPATIENT
Start: 2024-05-16

## 2024-05-16 RX ORDER — IPRATROPIUM BROMIDE AND ALBUTEROL SULFATE 2.5; .5 MG/3ML; MG/3ML
3 SOLUTION RESPIRATORY (INHALATION) ONCE AS NEEDED
Status: DISCONTINUED | OUTPATIENT
Start: 2024-05-16 | End: 2024-05-16 | Stop reason: HOSPADM

## 2024-05-16 RX ORDER — OXYCODONE HYDROCHLORIDE 5 MG/1
5 TABLET ORAL EVERY 4 HOURS PRN
Status: DISCONTINUED | OUTPATIENT
Start: 2024-05-16 | End: 2024-05-16 | Stop reason: HOSPADM

## 2024-05-16 RX ORDER — ONDANSETRON 4 MG/1
8 TABLET, ORALLY DISINTEGRATING ORAL EVERY 8 HOURS PRN
Status: DISCONTINUED | OUTPATIENT
Start: 2024-05-16 | End: 2024-05-16 | Stop reason: HOSPADM

## 2024-05-16 RX ORDER — GLYCOPYRROLATE 0.2 MG/ML
INJECTION INTRAMUSCULAR; INTRAVENOUS
Status: DISCONTINUED | OUTPATIENT
Start: 2024-05-16 | End: 2024-05-16

## 2024-05-16 RX ADMIN — PROPOFOL 190 MG: 10 INJECTION, EMULSION INTRAVENOUS at 03:05

## 2024-05-16 RX ADMIN — HYDROMORPHONE HYDROCHLORIDE 0.5 MG: 2 INJECTION, SOLUTION INTRAMUSCULAR; INTRAVENOUS; SUBCUTANEOUS at 04:05

## 2024-05-16 RX ADMIN — HYDROMORPHONE HYDROCHLORIDE 2 MG: 2 INJECTION INTRAMUSCULAR; INTRAVENOUS; SUBCUTANEOUS at 11:05

## 2024-05-16 RX ADMIN — DIPHENHYDRAMINE HYDROCHLORIDE 25 MG: 50 INJECTION, SOLUTION INTRAMUSCULAR; INTRAVENOUS at 04:05

## 2024-05-16 RX ADMIN — FENTANYL CITRATE 50 MCG: 50 INJECTION INTRAMUSCULAR; INTRAVENOUS at 03:05

## 2024-05-16 RX ADMIN — GLYCOPYRROLATE 0.2 MG: 0.2 INJECTION INTRAMUSCULAR; INTRAVENOUS at 03:05

## 2024-05-16 RX ADMIN — LIDOCAINE HYDROCHLORIDE 80 MG: 20 INJECTION, SOLUTION INTRAVENOUS at 03:05

## 2024-05-16 RX ADMIN — HYDROMORPHONE HYDROCHLORIDE 0.5 MG: 2 INJECTION INTRAMUSCULAR; INTRAVENOUS; SUBCUTANEOUS at 01:05

## 2024-05-16 RX ADMIN — CEFAZOLIN 3 G: 1 INJECTION, POWDER, FOR SOLUTION INTRAMUSCULAR; INTRAVENOUS; PARENTERAL at 03:05

## 2024-05-16 RX ADMIN — ONDANSETRON 4 MG: 2 INJECTION INTRAMUSCULAR; INTRAVENOUS at 03:05

## 2024-05-16 RX ADMIN — SODIUM CHLORIDE: 9 INJECTION, SOLUTION INTRAVENOUS at 03:05

## 2024-05-16 RX ADMIN — SODIUM CHLORIDE: 9 INJECTION, SOLUTION INTRAVENOUS at 02:05

## 2024-05-16 RX ADMIN — PROMETHAZINE HYDROCHLORIDE 25 MG: 25 INJECTION INTRAMUSCULAR; INTRAVENOUS at 11:05

## 2024-05-16 RX ADMIN — SODIUM CHLORIDE, POTASSIUM CHLORIDE, SODIUM LACTATE AND CALCIUM CHLORIDE 1000 ML: 600; 310; 30; 20 INJECTION, SOLUTION INTRAVENOUS at 12:05

## 2024-05-16 NOTE — PROGRESS NOTES
Subjective     Patient ID: Rosa Rutledge is a 43 y.o. female.    Chief Complaint: Initial Visit (Work in for stone pain. Was seen in ER on 05/15/2024.)       This is a 44 y/o female,who presents to the ED with complaints of RLQ pain. She states she believes she is dehydrated and has a UTI. There is no Hx of hematuria, dysuria, nausea or vomiting. She denies a known hx of kidney stones. There are no other complaints/pain in the ED at this time. She has a known hx of HTN.      The history is provided by the patient. No  was used.      Review of patient's allergies indicates:  No Known Allergies  Past Medical History:  Diagnosis Date  · Asthma    · COVID    · Hypertension       Past Surgical History:  Procedure Laterality Date  · ADENOIDECTOMY      · BREAST SURGERY Bilateral      reduction  · CHOLECYSTECTOMY   11/12/2020  · HYSTERECTOMY   2016    Full  · REDUCTION OF BOTH BREASTS   01/09/2020  · TONSILLECTOMY      · TOTAL REDUCTION MAMMOPLASTY      · TUBAL LIGATION         Family History  Problem Relation Name Age of Onset  · Diabetes Mother      · Hypertension Mother      · Diabetes Maternal Grandmother      · Hypertension Maternal Grandmother      · Asthma Maternal Grandmother      · Asthma Sister      · Hypertension Sister         Social History  Social History       Tobacco Use  · Smoking status: Never  · Smokeless tobacco: Never  Substance Use Topics  · Alcohol use: Never  · Drug use: Never       Review of Systems   Gastrointestinal:  Positive for abdominal pain (RLE pain.) and nausea. Negative for vomiting.   Genitourinary:  Negative for dysuria and hematuria.   All other systems reviewed and are negative.        Physical Exam     Initial Vitals [05/15/24 1136]  BP Pulse Resp Temp SpO2  (!) 120/90 (!) 120 20 100.2 °F (37.9 °C) 96 %     MAP          --             Physical Exam     Nursing note and vitals reviewed.  Constitutional: She appears well-developed and well-nourished.   HENT:    Head: Normocephalic and atraumatic.   Eyes: Conjunctivae and EOM are normal. Pupils are equal, round, and reactive to light.   Neck: Neck supple.   Normal range of motion.  Cardiovascular:  Normal rate, regular rhythm and normal heart sounds.           Pulmonary/Chest: Breath sounds normal. No respiratory distress.   Abdominal: Abdomen is soft. Bowel sounds are normal. There is no abdominal tenderness.   Musculoskeletal:         General: No tenderness. Normal range of motion.      Cervical back: Normal range of motion and neck supple.      Neurological: She is alert and oriented to person, place, and time.   Skin: Skin is warm and dry.   Psychiatric: She has a normal mood and affect.            ED Course  Procedures  Labs Reviewed - No data to display        Imaging Results   None           Medications - No data to display  Medical Decision Making  Amount and/or Complexity of Data Reviewed  Labs: ordered.  Radiology: ordered.     Risk  Prescription drug management.                 Attending Attestation:               Physician Attestation for Scribe:  Physician Attestation Statement for Scribe #1: I, Jya Snyder, DO, reviewed documentation, as scribed by Mirella Yoder in my presence, and it is both accurate and complete.               Plan             Medical Decision Making:   Initial Assessment:   This is a 42 y/o female,who presents to the ED with complaints of RLQ pain. She states she believes she is dehydrated and has a UTI. There is no Hx of hematuria, dysuria, nausea or vomiting. She denies a known hx of kidney stones. There are no other complaints/pain in the ED at this time. She has a known hx of HTN.      The history is provided by the patient. No  was used.      Differential Diagnosis:   7 mm right renal stone in the ureter.  Hydronephrosis  ED Management:  Discussed case with Dr. Howard he explained to give her pain medicine nausea medicine and have her drink plenty of  fluids.  It the pain gets worse she is to go in the morning to see Hema   in the clinic for re-evaluation and treatment.  Otherwise issues she should urinate through a strainer if she catches the stone bring it to the office within 1 week appointment was made with Krista for the patient to see Hema in 1 week the number [phone]   ---------------------------------------------------------------------------------------------------------------------------------------------------------------------------------------------------------------------------------------------------------------------------------------------------------------------------------------------------------------------  Ms. Rutledge was seen in emergency room yesterday as above.  She had sudden onset of right flank and abdominal pain yesterday.  No history of previous stones.  CT scan done in the emergency room revealed a 7 mm stone in the lower right ureter below the level of the iliac vessels.  She received pain medication in emergency room.  Did well until about 9:00 p.m. last night when she started hurting again and has been absolutely miserable since.  Presented to clinic this morning and is NPO with idea she may need procedure for the stone that is associated with rather severe right hydronephrosis.  No additional stones noted.  Patient would like relief of her pain and would like definitive treatment.     Only significant other health problem is asthma which is not been any problem in the last few years.  Does not require regular medication for that.  [May 16, 2024]               Review of Systems       Objective     Physical Exam  Constitutional:       Appearance: She is obese. She is ill-appearing.   HENT:      Head: Normocephalic.      Right Ear: External ear normal.      Left Ear: External ear normal.      Nose: Nose normal.      Mouth/Throat:      Mouth: Mucous membranes are moist.      Pharynx: Oropharynx is clear.   Eyes:       Pupils: Pupils are equal, round, and reactive to light.   Cardiovascular:      Rate and Rhythm: Normal rate and regular rhythm.      Heart sounds: Normal heart sounds.   Pulmonary:      Effort: Pulmonary effort is normal.      Breath sounds: Normal breath sounds.   Abdominal:      Palpations: Abdomen is soft.      Tenderness: There is right CVA tenderness.   Musculoskeletal:      Cervical back: Normal range of motion and neck supple.      Right lower leg: Edema present.      Left lower leg: Edema present.   Lymphadenopathy:      Cervical: No cervical adenopathy.   Skin:     General: Skin is warm and dry.   Neurological:      General: No focal deficit present.      Mental Status: She is alert.   Psychiatric:         Mood and Affect: Mood normal.         Behavior: Behavior normal.          Assessment and Plan     1. Right ureteral stone    2. Kidney stone  -     promethazine injection 25 mg  -     HYDROmorphone (PF) injection 2 mg    3. Nausea  -     promethazine injection 25 mg    4. Ureteral colic        CT scan reviewed with patient and sister.  Preliminary urine culture from emergency room yesterday negative.  Patient will be admitted to outpatient surgery for right ureteroscopy with laser lithotripsy of stone and right ureteral stent insertion.  She received 25 mg promethazine followed 10 minutes later by 2 mg Dilaudid in the clinic.  She is agreeable with plan for definitive treatment today.       No follow-ups on file.

## 2024-05-16 NOTE — DISCHARGE INSTRUCTIONS
DRINK EXTRA FLUIDS TO HELP FLUSH ANY BLOOD FROM THE URINE.  STRAIN URINE FOR FRAGMENTS AND SAVE WHAT YOU COLLECT AND PUT THEM IN THE CUP SENT HOME WITH YOU.  BRING STONE IN THE TEST TUBE WITH YOU ON RETURN APPT .6/20/2024 @ 2:45PM  TAKE PAIN MED AS NEEDED, DON'T DRIVE WHILE ON PAIN MEDS.    PULL URETERAL STENT MAY 20, 2024 -->MONDAY-- WHEN YOU WAKE UP URINATE PULL STRING WHILE YOU URINATE THIS WILL HELP IT TO SLIDE OUT.

## 2024-05-16 NOTE — ANESTHESIA POSTPROCEDURE EVALUATION
Anesthesia Post Evaluation    Patient: Rosa Rutledge    Procedure(s) Performed: Procedure(s) (LRB):  CYSTOURETEROSCOPY WITH HOLMIUM LASER LITHOTRIPSY OF URETERAL CALCULUS, STENT INSERTION AND INDICATED PROCEDURES (Right)    Final Anesthesia Type: general      Patient location during evaluation: PACU  Patient participation: Yes- Able to Participate  Level of consciousness: awake and alert and oriented  Post-procedure vital signs: reviewed and stable  Pain management: adequate  Airway patency: patent  ADDIE mitigation strategies: Multimodal analgesia  PONV status at discharge: No PONV  Anesthetic complications: no      Cardiovascular status: hemodynamically stable  Respiratory status: unassisted and spontaneous ventilation  Hydration status: euvolemic  Follow-up not needed.              Vitals Value Taken Time   /73 05/16/24 1649   Temp 37.8 °C (100.1 °F) 05/16/24 1612   Pulse 105 05/16/24 1651   Resp 20 05/16/24 1651   SpO2 93 % 05/16/24 1651   Vitals shown include unfiled device data.      No case tracking events are documented in the log.      Pain/Stacia Score: Pain Rating Prior to Med Admin: 7 (5/16/2024  4:35 PM)  Stacia Score: 8 (5/16/2024  4:07 PM)

## 2024-05-16 NOTE — TELEPHONE ENCOUNTER
Patient is in outpatient surgery and will have right ureteroscopy with laser lithotripsy of stone.  She did not get her pain medicine prescription filled yet.  We will submit prescription for Dilaudid and Phenergan.

## 2024-05-16 NOTE — ANESTHESIA PREPROCEDURE EVALUATION
05/16/2024  Rosa Rutledge is a 43 y.o., female.      Pre-op Assessment    I have reviewed the Patient Summary Reports.     I have reviewed the Nursing Notes. I have reviewed the NPO Status.   I have reviewed the Medications.     Review of Systems  Anesthesia Hx:  No problems with previous Anesthesia             Denies Family Hx of Anesthesia complications.    Denies Personal Hx of Anesthesia complications.                    Social:  No Alcohol Use, Non-Smoker       Cardiovascular:  Exercise tolerance: good   Hypertension              ECG has been reviewed.                          Pulmonary:    Asthma mild                   Renal/:  Chronic Renal Disease renal calculi  Right ureteral stone [N20.1]             Neurological:    Neuromuscular Disease,  Headaches                                 Endocrine:        Morbid Obesity / BMI > 40      Physical Exam  General: Well nourished, Cooperative and Alert    Airway:  Mallampati: II   Mouth Opening: Normal  TM Distance: Normal  Tongue: Normal  Neck ROM: Normal ROM    Dental:  Intact    Chest/Lungs:  Clear to auscultation, Normal Respiratory Rate    Heart:  Rate: Normal  Rhythm: Regular Rhythm        Chemistry        Component Value Date/Time     (L) 05/15/2024 1336    K 3.1 (L) 05/15/2024 1336     05/15/2024 1336    CO2 22 05/15/2024 1336    BUN 7 05/15/2024 1336    CREATININE 0.60 05/15/2024 1336    GLU 99 05/15/2024 1336        Component Value Date/Time    CALCIUM 9.0 05/15/2024 1336    ALKPHOS 89 05/15/2024 1336    AST 29 05/15/2024 1336    ALT 32 05/15/2024 1336    BILITOT 0.5 05/15/2024 1336    ESTGFRAFRICA 121 11/10/2021 1415    EGFRNONAA 134 03/04/2022 0837        Lab Results   Component Value Date    WBC 16.87 (H) 05/15/2024    HGB 11.1 (L) 05/15/2024    HCT 36.9 (L) 05/15/2024     05/15/2024           Anesthesia Plan  Type of  Anesthesia, risks & benefits discussed:    Anesthesia Type: Gen Supraglottic Airway  Intra-op Monitoring Plan: Standard ASA Monitors  Post Op Pain Control Plan: multimodal analgesia  Induction:  IV  Airway Plan: Direct, Post-Induction  Informed Consent: Informed consent signed with the Patient and all parties understand the risks and agree with anesthesia plan.  All questions answered.   ASA Score: 3  Day of Surgery Review of History & Physical: H&P Update referred to the surgeon/provider.I have interviewed and examined the patient. I have reviewed the patient's H&P dated: There are no significant changes.     Ready For Surgery From Anesthesia Perspective.     .

## 2024-05-16 NOTE — TRANSFER OF CARE
"Anesthesia Transfer of Care Note    Patient: Rosa Rutledge    Procedure(s) Performed: Procedure(s) (LRB):  CYSTOURETEROSCOPY WITH HOLMIUM LASER LITHOTRIPSY OF URETERAL CALCULUS, STENT INSERTION AND INDICATED PROCEDURES (Right)    Patient location: PACU    Anesthesia Type: general    Transport from OR: Transported from OR on 6-10 L/min O2 by face mask with adequate spontaneous ventilation    Post pain: adequate analgesia    Post assessment: no apparent anesthetic complications    Post vital signs: stable    Level of consciousness: awake, alert and oriented    Nausea/Vomiting: no nausea/vomiting    Complications: none    Transfer of care protocol was followedComments: Good SV continue, NAD noted, VSS, RTRN      Last vitals: Visit Vitals  BP (!) 155/91 (BP Location: Left arm, Patient Position: Lying)   Pulse 105   Temp 37.8 °C (100.1 °F) (Oral)   Resp 12   Ht 5' 1" (1.549 m)   Wt 123.8 kg (273 lb)   SpO2 100%   Breastfeeding No   BMI 51.58 kg/m²     "

## 2024-05-16 NOTE — OR NURSING
1607 REC'D TO PACU IN STABLE CONDITION. VSS. SATS 100% ON 6L/FM. WILL TITRATE O2 ATOL. RATES BACK PAIN 7/10 ON PAIN SCALE AT THIS TIME. URETHRAL STRING NOTED.  WILL CONT TO MONITOR.    1625 UPDATE ON PT STATUS GIVEN TO SON VIA TEXT MESSAGE.    1705 TRANSFERRED TO [facility] AWAKE IN STABLE CONDITION. REPORT GIVEN TO PIYUSH EATON RN. /75  SATS 94% ON RA. SISTER AT BEDSIDE.

## 2024-05-16 NOTE — H&P (VIEW-ONLY)
Subjective     Patient ID: Rosa Rutledge is a 43 y.o. female.    Chief Complaint: Initial Visit (Work in for stone pain. Was seen in ER on 05/15/2024.)       This is a 42 y/o female,who presents to the ED with complaints of RLQ pain. She states she believes she is dehydrated and has a UTI. There is no Hx of hematuria, dysuria, nausea or vomiting. She denies a known hx of kidney stones. There are no other complaints/pain in the ED at this time. She has a known hx of HTN.      The history is provided by the patient. No  was used.      Review of patient's allergies indicates:  No Known Allergies  Past Medical History:  Diagnosis Date  · Asthma    · COVID    · Hypertension       Past Surgical History:  Procedure Laterality Date  · ADENOIDECTOMY      · BREAST SURGERY Bilateral      reduction  · CHOLECYSTECTOMY   11/12/2020  · HYSTERECTOMY   2016    Full  · REDUCTION OF BOTH BREASTS   01/09/2020  · TONSILLECTOMY      · TOTAL REDUCTION MAMMOPLASTY      · TUBAL LIGATION         Family History  Problem Relation Name Age of Onset  · Diabetes Mother      · Hypertension Mother      · Diabetes Maternal Grandmother      · Hypertension Maternal Grandmother      · Asthma Maternal Grandmother      · Asthma Sister      · Hypertension Sister         Social History  Social History       Tobacco Use  · Smoking status: Never  · Smokeless tobacco: Never  Substance Use Topics  · Alcohol use: Never  · Drug use: Never       Review of Systems   Gastrointestinal:  Positive for abdominal pain (RLE pain.) and nausea. Negative for vomiting.   Genitourinary:  Negative for dysuria and hematuria.   All other systems reviewed and are negative.        Physical Exam     Initial Vitals [05/15/24 1136]  BP Pulse Resp Temp SpO2  (!) 120/90 (!) 120 20 100.2 °F (37.9 °C) 96 %     MAP          --             Physical Exam     Nursing note and vitals reviewed.  Constitutional: She appears well-developed and well-nourished.   HENT:    Head: Normocephalic and atraumatic.   Eyes: Conjunctivae and EOM are normal. Pupils are equal, round, and reactive to light.   Neck: Neck supple.   Normal range of motion.  Cardiovascular:  Normal rate, regular rhythm and normal heart sounds.           Pulmonary/Chest: Breath sounds normal. No respiratory distress.   Abdominal: Abdomen is soft. Bowel sounds are normal. There is no abdominal tenderness.   Musculoskeletal:         General: No tenderness. Normal range of motion.      Cervical back: Normal range of motion and neck supple.      Neurological: She is alert and oriented to person, place, and time.   Skin: Skin is warm and dry.   Psychiatric: She has a normal mood and affect.            ED Course  Procedures  Labs Reviewed - No data to display        Imaging Results   None           Medications - No data to display  Medical Decision Making  Amount and/or Complexity of Data Reviewed  Labs: ordered.  Radiology: ordered.     Risk  Prescription drug management.                 Attending Attestation:               Physician Attestation for Scribe:  Physician Attestation Statement for Scribe #1: I, Jay Snyder, DO, reviewed documentation, as scribed by Mirella Yoder in my presence, and it is both accurate and complete.               Plan             Medical Decision Making:   Initial Assessment:   This is a 42 y/o female,who presents to the ED with complaints of RLQ pain. She states she believes she is dehydrated and has a UTI. There is no Hx of hematuria, dysuria, nausea or vomiting. She denies a known hx of kidney stones. There are no other complaints/pain in the ED at this time. She has a known hx of HTN.      The history is provided by the patient. No  was used.      Differential Diagnosis:   7 mm right renal stone in the ureter.  Hydronephrosis  ED Management:  Discussed case with Dr. Howard he explained to give her pain medicine nausea medicine and have her drink plenty of  fluids.  It the pain gets worse she is to go in the morning to see Hema   in the clinic for re-evaluation and treatment.  Otherwise issues she should urinate through a strainer if she catches the stone bring it to the office within 1 week appointment was made with Krista for the patient to see Hema in 1 week the number [phone]   ---------------------------------------------------------------------------------------------------------------------------------------------------------------------------------------------------------------------------------------------------------------------------------------------------------------------------------------------------------------------  Ms. Rutledge was seen in emergency room yesterday as above.  She had sudden onset of right flank and abdominal pain yesterday.  No history of previous stones.  CT scan done in the emergency room revealed a 7 mm stone in the lower right ureter below the level of the iliac vessels.  She received pain medication in emergency room.  Did well until about 9:00 p.m. last night when she started hurting again and has been absolutely miserable since.  Presented to clinic this morning and is NPO with idea she may need procedure for the stone that is associated with rather severe right hydronephrosis.  No additional stones noted.  Patient would like relief of her pain and would like definitive treatment.     Only significant other health problem is asthma which is not been any problem in the last few years.  Does not require regular medication for that.  [May 16, 2024]               Review of Systems       Objective     Physical Exam  Constitutional:       Appearance: She is obese. She is ill-appearing.   HENT:      Head: Normocephalic.      Right Ear: External ear normal.      Left Ear: External ear normal.      Nose: Nose normal.      Mouth/Throat:      Mouth: Mucous membranes are moist.      Pharynx: Oropharynx is clear.   Eyes:       Pupils: Pupils are equal, round, and reactive to light.   Cardiovascular:      Rate and Rhythm: Normal rate and regular rhythm.      Heart sounds: Normal heart sounds.   Pulmonary:      Effort: Pulmonary effort is normal.      Breath sounds: Normal breath sounds.   Abdominal:      Palpations: Abdomen is soft.      Tenderness: There is right CVA tenderness.   Musculoskeletal:      Cervical back: Normal range of motion and neck supple.      Right lower leg: Edema present.      Left lower leg: Edema present.   Lymphadenopathy:      Cervical: No cervical adenopathy.   Skin:     General: Skin is warm and dry.   Neurological:      General: No focal deficit present.      Mental Status: She is alert.   Psychiatric:         Mood and Affect: Mood normal.         Behavior: Behavior normal.          Assessment and Plan     1. Right ureteral stone    2. Kidney stone  -     promethazine injection 25 mg  -     HYDROmorphone (PF) injection 2 mg    3. Nausea  -     promethazine injection 25 mg    4. Ureteral colic        CT scan reviewed with patient and sister.  Preliminary urine culture from emergency room yesterday negative.  Patient will be admitted to outpatient surgery for right ureteroscopy with laser lithotripsy of stone and right ureteral stent insertion.  She received 25 mg promethazine followed 10 minutes later by 2 mg Dilaudid in the clinic.  She is agreeable with plan for definitive treatment today.       No follow-ups on file.

## 2024-05-16 NOTE — PLAN OF CARE
1705-URETERAL STENT STRING NOTED COMING FROM URETHRA. INSTRUCTED TO BE CAREFUL AND NOT PULL IT OUT.

## 2024-05-16 NOTE — ANESTHESIA PROCEDURE NOTES
LMA    Date/Time: 5/16/2024 3:03 PM    Performed by: Jay Palacio CRNA  Authorized by: Chano Gonzales DO    Intubation:     Induction:  Intravenous    Intubated:  Postinduction    Mask Ventilation:  Easy mask    Attempts:  1    Attempted By:  CRNA    Difficult Airway Encountered?: No      Complications:  None    Airway Device:  Supraglottic airway/LMA    Airway Device Size:  4.0    Style/Cuff Inflation:  Cuffed    Endotracheal tube placement: TO SEAL.    Placement Verified By:  Capnometry    Complicating Factors:  Obesity, short neck and oropharyngeal edema or fat    Findings Post-Intubation:  BS equal bilateral and atraumatic/condition of teeth unchanged  Notes:      SMOOTH, TOLERATED WELL

## 2024-05-16 NOTE — PATIENT INSTRUCTIONS
CT scan reviewed with patient and sister.  Preliminary urine culture from emergency room yesterday negative.  Patient will be admitted to outpatient surgery for right ureteroscopy with laser lithotripsy of stone and right ureteral stent insertion.  She received 25 mg promethazine followed 10 minutes later by 2 mg Dilaudid in the clinic.  She is agreeable plan for definitive treatment today.

## 2024-05-16 NOTE — OP NOTE
[facility] - Periop Services  General Surgery  Operative Note    SUMMARY     Date of Procedure: 5/16/2024     Procedure: Procedure(s) (LRB):  CYSTOURETEROSCOPY WITH HOLMIUM LASER LITHOTRIPSY OF URETERAL CALCULUS, STENT INSERTION AND INDICATED PROCEDURES (Right)       Surgeons and Role:     * Luc Howard Jr., MD - Primary    Assistant:  VENKATESH TAN    Pre-Operative Diagnosis: Right ureteral stone [N20.1] with severe intermittent colic    Post-Operative Diagnosis: Post-Op Diagnosis Codes:     * Right ureteral stone [N20.1] with severe intermittent colic    Anesthesia: General LMA    Operative Findings (including complications, if any):     Description of Technical Procedures:   The patient was taken to the hospital endoscopy suite.  The anesthesia was general LMA.  The patient was placed in the modified dorsal lithotomy position preparing her for right ureteroscopy.  The urethra calibrated through 24 Italian with Marengo bougie.  The 21F Olympus rigid cystoscope was passed and bladder viewed with lateral and Foroblique lens.  No tumors, stones, diverticula or other significant abnormalities were noted.  Ureteral orifices were normal in size shape and position.  Ureteral access catheter was passed into the right ureter and a 0.038 guidewire was passed to the right kidney.  There was hang up at the point of the stone which was just below the level of the iliac vessels.  The access catheter was moved to the other port and replaced into the right ureter.  A 2nd 0.038 guidewire was passed to the right kidney.  2 guidewires were left in place and the cystoscope removed.  The Lugo short dual chamber ureteroscope was passed over 1 of the guidewires and passed transurethrally into the right ureter and up to the stone.  A   200 micron holmium laser fiber was used to fragment the stone into small pieces.  The working wire was removed and replaced with a stone basket.  Stone pieces were removed piecemeal down the  ureter or washed down the ureter as some of the stone was in very small pieces.  Repeat ureteroscopy was carried out all the way up to the ureteropelvic junction and no additional substantive stone fragments were noted.  Ureteroscope was removed.  The cystoscope was backloaded over the safety wire which was converted to the working wire.  A 7 Polish variable length stent was pushed to the right kidney.  The guidewire was removed and good curl of the stent was noted in the renal pelvis.  Good curl was also noted in the bladder.  The nylon string was left long.  The stone fragments were washed from the bladder through the scope.  Bladder was emptied of fluid and scope removed.  She was sent to the PACU in satisfactory condition having tolerated procedure well.  There was essentially no blood loss and there were no complications.    Significant Surgical Tasks Conducted by the Assistant(s), if Applicable:     Estimated Blood Loss (EBL): 1 mL           Implants:   Implant Name Type Inv. Item Serial No.  Lot No. LRB No. Used Action   STENT CONTOUR VL 7F 22-30CM - [id number]  STENT CONTOUR VL 7F 22-30CM  Opposing Views [id number] Right 1 Implanted       Specimens:   Specimen (24h ago, onward)      None                    Condition: Stable    Disposition: PACU - hemodynamically stable.    Attestation: I was present and scrubbed for the entire procedure.

## 2024-05-17 LAB
OHS QRS DURATION: 72 MS
OHS QTC CALCULATION: 430 MS
UA COMPLETE W REFLEX CULTURE PNL UR: NO GROWTH

## 2024-05-17 NOTE — DISCHARGE SUMMARY
This is a 42 y/o female,who presents to the ED with complaints of RLQ pain. She states she believes she is dehydrated and has a UTI. There is no Hx of hematuria, dysuria, nausea or vomiting. She denies a known hx of kidney stones. There are no other complaints/pain in the ED at this time. She has a known hx of HTN.      The history is provided by the patient. No  was used.      Differential Diagnosis:   7 mm right renal stone in the ureter.  Hydronephrosis  ED Management:  Discussed case with Dr. Howard he explained to give her pain medicine nausea medicine and have her drink plenty of fluids.  It the pain gets worse she is to go in the morning to see Hema   in the clinic for re-evaluation and treatment.  Otherwise issues she should urinate through a strainer if she catches the stone bring it to the office within 1 week appointment was made with Krista for the patient to see Hema in 1 week the number [phone]  ---------------------------------------------------------------------------------------------  Ms. Rutledge was seen in emergency room yesterday as above.  She had sudden onset of right flank and abdominal pain yesterday.  No history of previous stones.  CT scan done in the emergency room revealed a 7 mm stone in the lower right ureter below the level of the iliac vessels.  She received pain medication in emergency room.  Did well until about 9:00 p.m. last night when she started hurting again and has been absolutely miserable since.  Presented to clinic this morning and is NPO with idea she may need procedure for the stone that is associated with rather severe right hydronephrosis.  No additional stones noted.  Patient would like relief of her pain and would like definitive treatment.      Only significant other health problem is asthma which is not been any problem in the last few years.  Does not require regular medication for that.    Assessment:  Right ureteral stone  Kidney  "Stone  Nausea  Ureteral colic    Plan:  CT scan reviewed with patient and sister.  Preliminary urine culture from emergency room yesterday negative.  Patient will be admitted to outpatient surgery for right ureteroscopy with laser lithotripsy of stone and right ureteral stent insertion.  She received 25 mg promethazine followed 10 minutes later by 2 mg Dilaudid in the clinic.  She is agreeable with plan for definitive treatment today. [May 16, 2024]  -------------------------------------------------------------------------------------  The above note is from Dr. Howard's clinic visit with Mrs. Rutledge today, May 16, 2024.    Mrs. Rutledge was a direct admit from Dr. Howard's office to the outpatient surgery department of [facility] this morning. She was taken to OR # 11 where she underwent Cystoscopy, right ureteroscopy with laser lithotripsy, stone manipulation and basket extraction of stone fragments and insertion of right ureteral stent under general LMA by Dr. Howard (please see his operative procedure note for complete details). She tolerated the procedure well and was awakened and taken to PACU in stable condition. After PACU, she was taken back to ASC. She had an uneventful recovery requiring no postop pain medications. She has voided adequately postoperatively.  String was left intact on the distal end of her indwelling right ureteral stent. She was given verbal instructions on how to "pull" her ureteral stent. She will do this first thing Monday morning, May 20, 2024.     was performed on Mrs. Rutledge and no narcotic prescription has been written for her in the past year.    Dr. Howard E-scripted the following prescriptions for Mrs. Rutledge to [facility] as requested by Mrs. Rutledge:    Prescription # 1:  Dilaudid (Hydromorphone) 2 mg tablets, # 12 with no refill.  Patient may take 1 tablet by mouth every 4 hours as needed for pain.    Prescription # 2:  Promethazine 25 mg, # 12 with no refill.  " Patient may take 1 tablet by mouth every 4 - 6 hours as needed for nausea and vomiting.    She has a post-op return appointment with Dr. Howard on Thursday, June 20, 2024 at 2:45 p.m.  She will have a KUB x-ray first, then see Dr. Howard after the x-ray.    She knows to call our office if she has any problems prior to her June 20th appointment.    Mrs. Rutledge is awake, alert and feeling good.  She is now requesting to go home.  Patient discharged to home with family same day of admission, May 16, 2024.     Tez Woodruff, VIVIEN, LYNN,  [facility]

## 2024-05-20 ENCOUNTER — TELEPHONE (OUTPATIENT)
Dept: GASTROENTEROLOGY | Facility: CLINIC | Age: 44
End: 2024-05-20
Payer: COMMERCIAL

## 2024-05-20 NOTE — TELEPHONE ENCOUNTER
Patient called stating she had abdominal pain last week and found out she had a Kidney stone. Patient states Dr. Howard did Lithotripsy last Thursday. Patient states she is still having the same abdominal pain. Patient denies diarrhea or blood in still. Discussed with Dr. Padilla and he states we will continue with current plan for possible Crohn's. Instructed patient on recommendation. Patient verbalized understanding.

## 2024-05-22 ENCOUNTER — TELEPHONE (OUTPATIENT)
Dept: OBSTETRICS AND GYNECOLOGY | Facility: CLINIC | Age: 44
End: 2024-05-22
Payer: COMMERCIAL

## 2024-05-22 NOTE — TELEPHONE ENCOUNTER
Forward to Dr. Fernandez please assist with getting patient a prescription for kidney infection, Diflucan. Patient was seen in the ER [facility] on 05/15/2024, and she was given Bactrium, this did not work so now she is requesting Diflucan.

## 2024-05-23 RX ORDER — FLUCONAZOLE 100 MG/1
100 TABLET ORAL DAILY
Qty: 10 TABLET | Refills: 0 | Status: SHIPPED | OUTPATIENT
Start: 2024-05-23 | End: 2024-06-02

## 2024-05-23 NOTE — PROGRESS NOTES
Patient called in stating that she has vulvar pruritus.  Her chart has been reviewed.  The patient recently was seen in the emergency room for urinary tract like symptomatology.  Her urine culture at the time of the visit on 05/14/2024 was negative for growth.  She did have pyuria.  Subsequently, she was seen by Dr. Hwoard who diagnosed and treated a right ureteral stone.    Doctor Rebecca will send this patient Diflucan for 10 days 100 mg daily.

## 2024-05-24 RX ORDER — FLUCONAZOLE 150 MG/1
150 TABLET ORAL
Qty: 2 TABLET | Refills: 0 | Status: SHIPPED | OUTPATIENT
Start: 2024-05-24

## 2024-06-12 ENCOUNTER — PATIENT MESSAGE (OUTPATIENT)
Dept: ADMINISTRATIVE | Facility: OTHER | Age: 44
End: 2024-06-12

## 2024-06-12 ENCOUNTER — PATIENT MESSAGE (OUTPATIENT)
Dept: ADMINISTRATIVE | Facility: HOSPITAL | Age: 44
End: 2024-06-12

## 2024-06-17 DIAGNOSIS — M83.9 VITAMIN D DEFICIENT OSTEOMALACIA: ICD-10-CM

## 2024-06-20 ENCOUNTER — OFFICE VISIT (OUTPATIENT)
Dept: UROLOGY | Facility: CLINIC | Age: 44
End: 2024-06-20
Payer: COMMERCIAL

## 2024-06-20 ENCOUNTER — HOSPITAL ENCOUNTER (OUTPATIENT)
Dept: RADIOLOGY | Facility: HOSPITAL | Age: 44
Discharge: HOME | End: 2024-06-20
Attending: UROLOGY
Payer: COMMERCIAL

## 2024-06-20 VITALS
SYSTOLIC BLOOD PRESSURE: 129 MMHG | WEIGHT: 280 LBS | DIASTOLIC BLOOD PRESSURE: 89 MMHG | HEART RATE: 95 BPM | BODY MASS INDEX: 52.87 KG/M2 | HEIGHT: 61 IN

## 2024-06-20 DIAGNOSIS — N23 URETERAL COLIC: ICD-10-CM

## 2024-06-20 DIAGNOSIS — N20.0 KIDNEY STONES: ICD-10-CM

## 2024-06-20 DIAGNOSIS — N20.1 RIGHT URETERAL STONE: ICD-10-CM

## 2024-06-20 DIAGNOSIS — N20.0 KIDNEY STONES: Primary | ICD-10-CM

## 2024-06-20 DIAGNOSIS — Z09 POSTOP CHECK: Primary | ICD-10-CM

## 2024-06-20 PROCEDURE — 99024 POSTOP FOLLOW-UP VISIT: CPT | Mod: ,,, | Performed by: UROLOGY

## 2024-06-20 PROCEDURE — 3074F SYST BP LT 130 MM HG: CPT | Mod: ,,, | Performed by: UROLOGY

## 2024-06-20 PROCEDURE — 74018 RADEX ABDOMEN 1 VIEW: CPT | Mod: TC

## 2024-06-20 PROCEDURE — 4010F ACE/ARB THERAPY RXD/TAKEN: CPT | Mod: ,,, | Performed by: UROLOGY

## 2024-06-20 PROCEDURE — 99215 OFFICE O/P EST HI 40 MIN: CPT | Mod: PBBFAC,25 | Performed by: UROLOGY

## 2024-06-20 PROCEDURE — 99999 PR PBB SHADOW E&M-EST. PATIENT-LVL V: CPT | Mod: PBBFAC,,, | Performed by: UROLOGY

## 2024-06-20 PROCEDURE — 1160F RVW MEDS BY RX/DR IN RCRD: CPT | Mod: ,,, | Performed by: UROLOGY

## 2024-06-20 PROCEDURE — 74018 RADEX ABDOMEN 1 VIEW: CPT | Mod: 26,,, | Performed by: RADIOLOGY

## 2024-06-20 PROCEDURE — 1159F MED LIST DOCD IN RCRD: CPT | Mod: ,,, | Performed by: UROLOGY

## 2024-06-20 PROCEDURE — 3079F DIAST BP 80-89 MM HG: CPT | Mod: ,,, | Performed by: UROLOGY

## 2024-06-20 RX ORDER — ERGOCALCIFEROL 1.25 MG/1
50000 CAPSULE ORAL
Qty: 8 CAPSULE | Refills: 0 | Status: SHIPPED | OUTPATIENT
Start: 2024-06-20

## 2024-06-20 NOTE — PROGRESS NOTES
Subjective     Patient ID: Rosa Rutledge is a 43 y.o. female.    Chief Complaint: Post-op Evaluation (One month post op right ureteroscopy on 5/16/24)       This is a 44 y/o female,who presents to the ED with complaints of RLQ pain. She states she believes she is dehydrated and has a UTI. There is no Hx of hematuria, dysuria, nausea or vomiting. She denies a known hx of kidney stones. There are no other complaints/pain in the ED at this time. She has a known hx of HTN.      The history is provided by the patient. No  was used.      Review of patient's allergies indicates:  No Known Allergies  Past Medical History:  DiagnosisDate  ·           Asthma               ·           COVID    ·           Hypertension         Past Surgical History:  ProcedureLateralityDate  ·           ADENOIDECTOMY                   ·           BREAST SURGERY   Bilateral                          reduction  ·           CHOLECYSTECTOMY                      11/12/2020  ·           HYSTERECTOMY                  2016              Full  ·           REDUCTION OF BOTH BREASTS               01/09/2020  ·           TONSILLECTOMY                    ·           TOTAL REDUCTION MAMMOPLASTY                       ·           TUBAL LIGATION                        Family History  ProblemRelationNameAge of Onset  ·           Diabetes          Mother                ·           Hypertension   Mother                ·           Diabetes          Maternal Grandmother                          ·           Hypertension   Maternal Grandmother                          ·           Asthma            Maternal Grandmother                          ·           Asthma            Sister                  ·           Hypertension   Sister                     Social History  Social History        Tobacco Use  ·Smoking status:Never  ·Smokeless tobacco:Never  Substance Use Topics  ·Alcohol use:Never  ·Drug use:Never        Review of Systems   Gastrointestinal:   Positive for abdominal pain (RLE pain.) and nausea. Negative for vomiting.   Genitourinary:  Negative for dysuria and hematuria.   All other systems reviewed and are negative.        Physical Exam     Initial Vitals [05/15/24 1136]  BPPulseRespTempSpO2  (!) 120/90(!) 87358418.2 °F (37.9 °C)96 %     MAP                                           --                                                    Physical Exam     Nursing note and vitals reviewed.  Constitutional: She appears well-developed and well-nourished.   HENT:   Head: Normocephalic and atraumatic.   Eyes: Conjunctivae and EOM are normal. Pupils are equal, round, and reactive to light.   Neck: Neck supple.   Normal range of motion.  Cardiovascular:  Normal rate, regular rhythm and normal heart sounds.           Pulmonary/Chest: Breath sounds normal. No respiratory distress.   Abdominal: Abdomen is soft. Bowel sounds are normal. There is no abdominal tenderness.   Musculoskeletal:         General: No tenderness. Normal range of motion.      Cervical back: Normal range of motion and neck supple.      Neurological: She is alert and oriented to person, place, and time.   Skin: Skin is warm and dry.   Psychiatric: She has a normal mood and affect.            ED Course  Procedures  Labs Reviewed - No data to display        Imaging Results   None           Medications - No data to display  Medical Decision Making  Amount and/or Complexity of Data Reviewed  Labs: ordered.  Radiology: ordered.     Risk  Prescription drug management.                 Attending Attestation:               Physician Attestation for Scribe:  Physician Attestation Statement for Scribe #1: I, Jay Snyder, DO, reviewed documentation, as scribed by Mirella Yoder in my presence, and it is both accurate and complete.               Plan             Medical Decision Making:   Initial Assessment:   This is a 42 y/o female,who presents to the ED with complaints of RLQ pain. She states she  believes she is dehydrated and has a UTI. There is no Hx of hematuria, dysuria, nausea or vomiting. She denies a known hx of kidney stones. There are no other complaints/pain in the ED at this time. She has a known hx of HTN.      The history is provided by the patient. No  was used.      Differential Diagnosis:   7 mm right renal stone in the ureter.  Hydronephrosis  ED Management:  Discussed case with Dr. Howard he explained to give her pain medicine nausea medicine and have her drink plenty of fluids.  It the pain gets worse she is to go in the morning to see Hema   in the clinic for re-evaluation and treatment.  Otherwise issues she should urinate through a strainer if she catches the stone bring it to the office within 1 week appointment was made with Krista for the patient to see Hema in 1 week the number [phone]   ---------------------------------------------------------------------------------------------------------------------------------------------------------------------------------------------------------------------------------------------------------------------------------------------------------------------------------------------------------------------  Ms. Rutledge was seen in emergency room yesterday as above.  She had sudden onset of right flank and abdominal pain yesterday.  No history of previous stones.  CT scan done in the emergency room revealed a 7 mm stone in the lower right ureter below the level of the iliac vessels.  She received pain medication in emergency room.  Did well until about 9:00 p.m. last night when she started hurting again and has been absolutely miserable since.  Presented to clinic this morning and is NPO with idea she may need procedure for the stone that is associated with rather severe right hydronephrosis.  No additional stones noted.  Patient would like relief of her pain and would like definitive treatment.      Only significant other  health problem is asthma which is not been any problem in the last few years.  Does not require regular medication for that.  [May 16, 2024]   -------------------------------------------------------------------------------------------------------------------------------------------------------------------------------------------------  Ms. Rutledge underwent a right ureteroscopy with laser lithotripsy and stent insertion on May 16.  She did very well with surgery and following surgery.  She did pass sand after she got over the procedure and removed her stent.  Brought stone back today which we will analyze.  Doing very well in general. [June 20, 2024]    Review of Systems       Objective     Physical Exam  Constitutional:       Appearance: Normal appearance. She is obese.   Neurological:      Mental Status: She is alert.   Psychiatric:         Mood and Affect: Mood normal.         Behavior: Behavior normal.     Urinalysis showed occasional pus cells but I did not see any red blood cells.  Dipstick had a trace of hemolyzed blood with pH 5.5 and specific gravity 1.030     Assessment and Plan     1. Postop check    2. Kidney stones  -     Surgical Pathology; Future; Expected date: 06/20/2024    3. Right ureteral stone    4. Ureteral colic        Patient doing well clinically and having no pain.  Stone fragments sent for stone analysis.  Patient advised to increase water ingestion as urine is concentrated today although she says she has done better in general since stone problem developed with water drinking.  She will be seen again as needed.  She understands I plan to retire.       No follow-ups on file.

## 2024-06-21 NOTE — PATIENT INSTRUCTIONS
Patient doing well clinically and having no pain.  Stone fragments sent for stone analysis.  Patient advised to increase water ingestion as urine is concentrated today although she says she has done better in general since stone problem developed with water drinking.  She will be seen again as needed.  She understands I plan to retire.

## 2024-08-23 ENCOUNTER — OFFICE VISIT (OUTPATIENT)
Dept: INTERNAL MEDICINE | Facility: CLINIC | Age: 44
End: 2024-08-23

## 2024-08-23 ENCOUNTER — PATIENT MESSAGE (OUTPATIENT)
Dept: INTERNAL MEDICINE | Facility: CLINIC | Age: 44
End: 2024-08-23

## 2024-08-23 DIAGNOSIS — E66.9 OBESITY, UNSPECIFIED CLASSIFICATION, UNSPECIFIED OBESITY TYPE, UNSPECIFIED WHETHER SERIOUS COMORBIDITY PRESENT: Primary | ICD-10-CM

## 2024-08-23 RX ORDER — SEMAGLUTIDE 0.5 MG/.5ML
0.5 INJECTION, SOLUTION SUBCUTANEOUS
Qty: 2 ML | Refills: 0 | Status: SHIPPED | OUTPATIENT
Start: 2024-09-20

## 2024-08-23 RX ORDER — SEMAGLUTIDE 0.25 MG/.5ML
0.25 INJECTION, SOLUTION SUBCUTANEOUS
Qty: 2 ML | Refills: 0 | Status: SHIPPED | OUTPATIENT
Start: 2024-08-23

## 2024-08-23 RX ORDER — SEMAGLUTIDE 1 MG/.5ML
1 INJECTION, SOLUTION SUBCUTANEOUS
Qty: 2 ML | Refills: 0 | Status: SHIPPED | OUTPATIENT
Start: 2024-10-18

## 2024-08-23 NOTE — PATIENT INSTRUCTIONS
Wegovy Patient Education  Savings Card  Follow this link to sign up for your Wegovy savings card. You will need this information when the [place] specialty pharmacy contacts you to help pay for your prescription.  Wegovy Savings Card    Dosing Schedule      Choosing an Injection Site and Using your Pen       - Pens are stored in the refrigerator and can be removed 20-30 minutes before the injection to allow the medication to come to room temperature to avoid irritation, burning, or bruising with injection.     What to Expect      Rare, but Serious Side Effects  - Wegovy may cause pancreatitis or gallstones. If you experience severe abdominal pain that may radiate to the back and may or may not be accompanied by vomiting, you are encouraged to seek medical attention to assess your symptoms.     Reproduction, Pregnancy, and Lactation Considerations  - Wegovy is not recommended for use in patients who are currently pregnant or breastfeeding.   - If you plan to become pregnant, the use of this medication is not recommended at the time of conception. It is recommended that this medication should be discontinued at least 2 months prior to conception.     Patient's using Oral Contraceptives  - Use of medications like Wegovy may decrease the effectiveness of your oral hormonal contraceptives.   - You are encouraged to add a barrier method of contraception for 4 weeks after initiation and for 4 weeks after each dose titration of Wegovy to minimize this potential risk.     Missed or Changing Your Dosing Schedule  - If you miss a dose of Wegovy, take it as soon as possible, within 5 days of your scheduled dose. If more than 5 days have passed, skip the missed dose and take your next dose on your regularly scheduled day.  - If you would like to change the day of the week you take your Wegovy dose, make sure there are at least 2 days between doses.

## 2024-08-23 NOTE — PROGRESS NOTES
Patient ID: Rosa Rutledge is a 43 y.o. Black or  female    Subjective  Chief Complaint: patient presents for medical weight loss management.    Contraindications to GLP-1 receptor agonist therapy:   Denies personal or family history of MTC, personal history of MEN2, history of allergic reaction while taking a GLP-1 receptor agonist, and history of pancreatitis while taking a GLP-1 receptor agonist     Co-morbidities: HTN    History of weight loss therapy:  None. Is interested in starting Wegovy today.    Weight loss history:  Current weight:    8/19/2024   Recent Readings    Weight (lbs) 270 lb    BMI 49.38 BMI        Objective  Lab Results   Component Value Date     (L) 05/15/2024     02/15/2024     11/14/2023     Lab Results   Component Value Date    K 3.1 (L) 05/15/2024    K 3.8 02/15/2024    K 3.4 (L) 11/14/2023     Lab Results   Component Value Date     05/15/2024     02/15/2024     11/14/2023     Lab Results   Component Value Date    CO2 22 05/15/2024    CO2 28 02/15/2024    CO2 25 11/14/2023     Lab Results   Component Value Date    BUN 7 05/15/2024    BUN 8 02/15/2024    BUN 12 11/14/2023     Lab Results   Component Value Date    GLU 99 05/15/2024    GLU 92 02/15/2024    GLU 86 11/14/2023     Lab Results   Component Value Date    CALCIUM 9.0 05/15/2024    CALCIUM 9.5 02/15/2024    CALCIUM 9.1 11/14/2023     Lab Results   Component Value Date    PROT 7.5 05/15/2024    PROT 7.8 02/15/2024    PROT 7.9 11/14/2023     Lab Results   Component Value Date    ALBUMIN 3.0 (L) 05/15/2024    ALBUMIN 3.3 (L) 02/15/2024    ALBUMIN 3.3 (L) 11/14/2023     Lab Results   Component Value Date    BILITOT 0.5 05/15/2024    BILITOT 0.3 02/15/2024    BILITOT 0.4 11/14/2023     Lab Results   Component Value Date    AST 29 05/15/2024    AST 19 02/15/2024    AST 16 11/14/2023     Lab Results   Component Value Date    ALT 32 05/15/2024    ALT 24 02/15/2024    ALT 26 11/14/2023      Lab Results   Component Value Date    ANIONGAP 11 05/15/2024    ANIONGAP 7 02/15/2024    ANIONGAP 10 11/14/2023     Lab Results   Component Value Date    CREATININE 0.60 05/15/2024    CREATININE 0.56 02/15/2024    CREATININE 0.66 11/14/2023     Lab Results   Component Value Date    EGFRNORACEVR 113 05/15/2024    EGFRNORACEVR 115 02/15/2024    EGFRNORACEVR 110 11/14/2023     Assessment/Plan  -Pt qualifies for GLP-1 RA therapy based on BMI greater than or equal to 30 kg/m2  -Initiate Wegovy 0.25 mg once weekly for 1 month  -Then increase to Wegovy 0.5 mg once weekly for 1 month  -Then increase to Wegovy 1 mg once weekly  -RTC in 3 months    Patient consented to pharmacist management via collaborative practice.

## 2024-08-26 ENCOUNTER — OFFICE VISIT (OUTPATIENT)
Dept: ORTHOPEDICS | Facility: CLINIC | Age: 44
End: 2024-08-26
Payer: COMMERCIAL

## 2024-08-26 DIAGNOSIS — M25.562 LEFT KNEE PAIN, UNSPECIFIED CHRONICITY: Primary | ICD-10-CM

## 2024-08-26 PROBLEM — E66.9 OBESITY, UNSPECIFIED: Status: ACTIVE | Noted: 2024-08-23

## 2024-08-26 PROCEDURE — 99999 PR PBB SHADOW E&M-EST. PATIENT-LVL III: CPT | Mod: PBBFAC,,, | Performed by: ORTHOPAEDIC SURGERY

## 2024-08-26 PROCEDURE — 4010F ACE/ARB THERAPY RXD/TAKEN: CPT | Mod: ,,, | Performed by: ORTHOPAEDIC SURGERY

## 2024-08-26 PROCEDURE — 20610 DRAIN/INJ JOINT/BURSA W/O US: CPT | Mod: PBBFAC | Performed by: ORTHOPAEDIC SURGERY

## 2024-08-26 PROCEDURE — 99213 OFFICE O/P EST LOW 20 MIN: CPT | Mod: PBBFAC | Performed by: ORTHOPAEDIC SURGERY

## 2024-08-26 PROCEDURE — 1159F MED LIST DOCD IN RCRD: CPT | Mod: ,,, | Performed by: ORTHOPAEDIC SURGERY

## 2024-08-26 PROCEDURE — 99213 OFFICE O/P EST LOW 20 MIN: CPT | Mod: S$PBB,25,, | Performed by: ORTHOPAEDIC SURGERY

## 2024-08-26 PROCEDURE — 99999PBSHW PR PBB SHADOW TECHNICAL ONLY FILED TO HB: Mod: PBBFAC,,,

## 2024-08-26 RX ORDER — BUPIVACAINE HYDROCHLORIDE 2.5 MG/ML
1 INJECTION, SOLUTION EPIDURAL; INFILTRATION; INTRACAUDAL
Status: DISCONTINUED | OUTPATIENT
Start: 2024-08-26 | End: 2024-08-26 | Stop reason: HOSPADM

## 2024-08-26 RX ORDER — TRIAMCINOLONE ACETONIDE 40 MG/ML
40 INJECTION, SUSPENSION INTRA-ARTICULAR; INTRAMUSCULAR
Status: DISCONTINUED | OUTPATIENT
Start: 2024-08-26 | End: 2024-08-26 | Stop reason: HOSPADM

## 2024-08-26 RX ADMIN — BUPIVACAINE HYDROCHLORIDE 1 ML: 2.5 INJECTION, SOLUTION EPIDURAL; INFILTRATION; INTRACAUDAL at 02:08

## 2024-08-26 RX ADMIN — TRIAMCINOLONE ACETONIDE 40 MG: 40 INJECTION, SUSPENSION INTRA-ARTICULAR; INTRAMUSCULAR at 02:08

## 2024-08-26 NOTE — PROGRESS NOTES
Patient is here for left knee arthritic changes.  Previous injection was 5 months ago.  She wished an injection left knee today.  I injected her with 1 cc Marcaine 1 cc Kenalog.  Let her weightbear as tolerates.  Follow back up in 3-4 months.

## 2024-08-26 NOTE — PROCEDURES
Large Joint Aspiration/Injection: L knee    Date/Time: 8/26/2024 2:50 PM    Performed by: Dontae Downing MD  Authorized by: Dontae Downing MD    Consent Done?:  Yes (Verbal)  Indications:  Arthritis    Details:  Needle Size:  22 G  Ultrasonic Guidance for needle placement?: No    Approach:  Anterolateral  Location:  Knee  Site:  L knee  Medications:  1 mL BUPivacaine (PF) 0.25% (2.5 mg/ml) 0.25 % (2.5 mg/mL); 40 mg triamcinolone acetonide 40 mg/mL  Patient tolerance:  Patient tolerated the procedure well with no immediate complications

## 2024-08-27 ENCOUNTER — PATIENT MESSAGE (OUTPATIENT)
Dept: INTERNAL MEDICINE | Facility: CLINIC | Age: 44
End: 2024-08-27

## 2024-09-03 ENCOUNTER — TELEPHONE (OUTPATIENT)
Dept: INTERNAL MEDICINE | Facility: CLINIC | Age: 44
End: 2024-09-03

## 2024-09-03 DIAGNOSIS — E66.9 OBESITY, UNSPECIFIED CLASSIFICATION, UNSPECIFIED OBESITY TYPE, UNSPECIFIED WHETHER SERIOUS COMORBIDITY PRESENT: ICD-10-CM

## 2024-09-03 RX ORDER — SEMAGLUTIDE 0.25 MG/.5ML
0.25 INJECTION, SOLUTION SUBCUTANEOUS
Qty: 2 ML | Refills: 0 | Status: ACTIVE | OUTPATIENT
Start: 2024-09-03

## 2024-09-03 NOTE — TELEPHONE ENCOUNTER
"----- Message from Leatha Bishop PharmD sent at 8/30/2024  3:35 PM CDT -----  Regarding: Replacement  Hi!    Ms Lee called today stating that she received her first package of wegovy and when she opened the box a pen had already been "administered. The yellow bar was across the viewing window and the medication was covering the other pens making them sticky. I provided her with the wegovy replacement line and she has gotten a replacement voucher. Can you please send a prescription for Ms Lee to be filled with her replacement voucher? She has just received her first rx for wegovy 0.25 mg.    Thank you  Leatha Bishop PharmD  [facility]  Phone: [phone]  Fax: [phone]  "

## 2024-09-20 ENCOUNTER — HOSPITAL ENCOUNTER (EMERGENCY)
Facility: HOSPITAL | Age: 44
Discharge: HOME | End: 2024-09-20
Payer: COMMERCIAL

## 2024-09-20 VITALS
SYSTOLIC BLOOD PRESSURE: 149 MMHG | RESPIRATION RATE: 18 BRPM | BODY MASS INDEX: 46.07 KG/M2 | DIASTOLIC BLOOD PRESSURE: 88 MMHG | HEIGHT: 63 IN | OXYGEN SATURATION: 98 % | HEART RATE: 103 BPM | WEIGHT: 260 LBS | TEMPERATURE: 98 F

## 2024-09-20 DIAGNOSIS — E87.6 HYPOKALEMIA: ICD-10-CM

## 2024-09-20 DIAGNOSIS — T14.8XXA MUSCLE STRAIN: ICD-10-CM

## 2024-09-20 DIAGNOSIS — R07.9 CHEST PAIN: ICD-10-CM

## 2024-09-20 DIAGNOSIS — M54.9 UPPER BACK PAIN: Primary | ICD-10-CM

## 2024-09-20 LAB
ANION GAP SERPL CALCULATED.3IONS-SCNC: 12 MMOL/L (ref 7–16)
APTT PPP: 28.8 SECONDS (ref 25.2–37.3)
BASOPHILS # BLD AUTO: 0.03 K/UL (ref 0–0.2)
BASOPHILS NFR BLD AUTO: 0.4 % (ref 0–1)
BUN SERPL-MCNC: 6 MG/DL (ref 7–18)
BUN/CREAT SERPL: 9 (ref 6–20)
CALCIUM SERPL-MCNC: 10.2 MG/DL (ref 8.5–10.1)
CHLORIDE SERPL-SCNC: 100 MMOL/L (ref 98–107)
CO2 SERPL-SCNC: 28 MMOL/L (ref 21–32)
CREAT SERPL-MCNC: 0.65 MG/DL (ref 0.55–1.02)
DIFFERENTIAL METHOD BLD: ABNORMAL
EGFR (NO RACE VARIABLE) (RUSH/TITUS): 111 ML/MIN/1.73M2
EOSINOPHIL # BLD AUTO: 0.04 K/UL (ref 0–0.5)
EOSINOPHIL NFR BLD AUTO: 0.5 % (ref 1–4)
ERYTHROCYTE [DISTWIDTH] IN BLOOD BY AUTOMATED COUNT: 13 % (ref 11.5–14.5)
GLUCOSE SERPL-MCNC: 102 MG/DL (ref 74–106)
HCT VFR BLD AUTO: 45.4 % (ref 38–47)
HGB BLD-MCNC: 14 G/DL (ref 12–16)
IMM GRANULOCYTES # BLD AUTO: 0.02 K/UL (ref 0–0.04)
IMM GRANULOCYTES NFR BLD: 0.2 % (ref 0–0.4)
INR BLD: 1.03
LYMPHOCYTES # BLD AUTO: 1.72 K/UL (ref 1–4.8)
LYMPHOCYTES NFR BLD AUTO: 20.1 % (ref 27–41)
MAGNESIUM SERPL-MCNC: 2.1 MG/DL (ref 1.7–2.3)
MCH RBC QN AUTO: 25 PG (ref 27–31)
MCHC RBC AUTO-ENTMCNC: 30.8 G/DL (ref 32–36)
MCV RBC AUTO: 81.2 FL (ref 80–96)
MONOCYTES # BLD AUTO: 0.28 K/UL (ref 0–0.8)
MONOCYTES NFR BLD AUTO: 3.3 % (ref 2–6)
MPC BLD CALC-MCNC: 10.8 FL (ref 9.4–12.4)
NEUTROPHILS # BLD AUTO: 6.46 K/UL (ref 1.8–7.7)
NEUTROPHILS NFR BLD AUTO: 75.5 % (ref 53–65)
NRBC # BLD AUTO: 0 X10E3/UL
NRBC, AUTO (.00): 0 %
PLATELET # BLD AUTO: 460 K/UL (ref 150–400)
POTASSIUM SERPL-SCNC: 3 MMOL/L (ref 3.5–5.1)
PROTHROMBIN TIME: 13.4 SECONDS (ref 11.7–14.7)
RBC # BLD AUTO: 5.59 M/UL (ref 4.2–5.4)
SODIUM SERPL-SCNC: 137 MMOL/L (ref 136–145)
TROPONIN I SERPL DL<=0.01 NG/ML-MCNC: 4.6 PG/ML
TROPONIN I SERPL DL<=0.01 NG/ML-MCNC: 4.9 PG/ML
WBC # BLD AUTO: 8.55 K/UL (ref 4.5–11)

## 2024-09-20 PROCEDURE — 63600175 PHARM REV CODE 636 W HCPCS: Performed by: NURSE PRACTITIONER

## 2024-09-20 PROCEDURE — 96372 THER/PROPH/DIAG INJ SC/IM: CPT | Performed by: NURSE PRACTITIONER

## 2024-09-20 PROCEDURE — 99285 EMERGENCY DEPT VISIT HI MDM: CPT | Mod: 25

## 2024-09-20 PROCEDURE — 85610 PROTHROMBIN TIME: CPT | Performed by: NURSE PRACTITIONER

## 2024-09-20 PROCEDURE — 93005 ELECTROCARDIOGRAM TRACING: CPT

## 2024-09-20 PROCEDURE — 85730 THROMBOPLASTIN TIME PARTIAL: CPT | Performed by: NURSE PRACTITIONER

## 2024-09-20 PROCEDURE — 36415 COLL VENOUS BLD VENIPUNCTURE: CPT | Performed by: NURSE PRACTITIONER

## 2024-09-20 PROCEDURE — 84484 ASSAY OF TROPONIN QUANT: CPT | Performed by: NURSE PRACTITIONER

## 2024-09-20 PROCEDURE — 83735 ASSAY OF MAGNESIUM: CPT | Performed by: NURSE PRACTITIONER

## 2024-09-20 PROCEDURE — 80048 BASIC METABOLIC PNL TOTAL CA: CPT | Performed by: NURSE PRACTITIONER

## 2024-09-20 PROCEDURE — 85025 COMPLETE CBC W/AUTO DIFF WBC: CPT | Performed by: NURSE PRACTITIONER

## 2024-09-20 RX ORDER — KETOROLAC TROMETHAMINE 30 MG/ML
60 INJECTION, SOLUTION INTRAMUSCULAR; INTRAVENOUS
Status: COMPLETED | OUTPATIENT
Start: 2024-09-20 | End: 2024-09-20

## 2024-09-20 RX ORDER — METHOCARBAMOL 500 MG/1
1000 TABLET, FILM COATED ORAL 3 TIMES DAILY
Qty: 30 TABLET | Refills: 0 | Status: SHIPPED | OUTPATIENT
Start: 2024-09-20 | End: 2024-09-25

## 2024-09-20 RX ORDER — IBUPROFEN 800 MG/1
800 TABLET ORAL 3 TIMES DAILY
Qty: 15 TABLET | Refills: 0 | Status: SHIPPED | OUTPATIENT
Start: 2024-09-20 | End: 2024-09-25

## 2024-09-20 RX ADMIN — KETOROLAC TROMETHAMINE 60 MG: 30 INJECTION, SOLUTION INTRAMUSCULAR at 01:09

## 2024-09-20 NOTE — ED PROVIDER NOTES
Encounter Date: 9/20/2024       History     Chief Complaint   Patient presents with    Chest Pain     43-year-old female presents to the emergency department to be evaluated for pain to her left upper back and shoulder area.  It began yesterday.  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  Her pain increases when she twists.  Denies any shortness of breath or exertional pain.    The history is provided by the patient.   Back Pain   This is a new problem. Pertinent negatives include no chest pain, no fever, no numbness, no weight loss, no headaches, no abdominal pain, no abdominal swelling, no bowel incontinence, no perianal numbness, no bladder incontinence, no dysuria, no pelvic pain, no leg pain, no paresthesias, no paresis, no tingling and no weakness.     Review of patient's allergies indicates:  No Known Allergies  Past Medical History:   Diagnosis Date    Asthma     COVID     Hypertension     Kidney stone      Past Surgical History:   Procedure Laterality Date    ADENOIDECTOMY      BREAST SURGERY Bilateral     reduction    CHOLECYSTECTOMY  11/12/2020    CYSTOURETEROSCOPY, WITH HOLMIUM LASER LITHOTRIPSY OF URETERAL CALCULUS AND STENT INSERTION Right 5/16/2024    Procedure: CYSTOURETEROSCOPY WITH HOLMIUM LASER LITHOTRIPSY OF URETERAL CALCULUS, STENT INSERTION AND INDICATED PROCEDURES;  Surgeon: Luc Howard Jr., MD;  Location: [facility];  Service: Urology;  Laterality: Right;    HYSTERECTOMY  2016    Full    REDUCTION OF BOTH BREASTS  01/09/2020    TONSILLECTOMY      TOTAL REDUCTION MAMMOPLASTY      TUBAL LIGATION       Family History   Problem Relation Name Age of Onset    Diabetes Mother      Hypertension Mother      Diabetes Maternal Grandmother      Hypertension Maternal Grandmother      Asthma Maternal Grandmother      Asthma Sister      Hypertension Sister       Social History     Tobacco Use    Smoking status: Never    Smokeless tobacco: Never   Substance Use Topics     Alcohol use: Never    Drug use: Never     Review of Systems   Constitutional:  Negative for chills, fever and weight loss.   Respiratory:  Negative for cough and shortness of breath.    Cardiovascular:  Negative for chest pain.   Gastrointestinal:  Negative for abdominal pain, bowel incontinence, diarrhea, nausea and vomiting.   Genitourinary:  Negative for bladder incontinence, dysuria and pelvic pain.   Musculoskeletal:  Positive for back pain.   Neurological:  Negative for tingling, weakness, numbness, headaches and paresthesias.   All other systems reviewed and are negative.      Physical Exam     Initial Vitals [09/20/24 0933]   BP Pulse Resp Temp SpO2   (!) 149/88 103 18 98 °F (36.7 °C) 98 %      MAP       --         Physical Exam    Vitals reviewed.  Constitutional: She appears well-developed and well-nourished.   Neck: Neck supple.   Cardiovascular:  Normal rate and regular rhythm.           Pulmonary/Chest: Breath sounds normal.   Abdominal: Abdomen is soft. Bowel sounds are normal. She exhibits no distension and no mass. There is no abdominal tenderness. There is no rebound and no guarding.   Musculoskeletal:         General: Normal range of motion.      Left shoulder: Normal.      Cervical back: Normal and neck supple.        Back:      Neurological: She is alert and oriented to person, place, and time. She has normal strength. GCS score is 15. GCS eye subscore is 4. GCS verbal subscore is 5. GCS motor subscore is 6.   Skin: Skin is warm and dry. Capillary refill takes less than 2 seconds.   Psychiatric: She has a normal mood and affect.         Medical Screening Exam   See Full Note    ED Course   Procedures  Labs Reviewed   BASIC METABOLIC PANEL - Abnormal       Result Value    Sodium 137      Potassium 3.0 (*)     Chloride 100      CO2 28      Anion Gap 12      Glucose 102      BUN 6 (*)     Creatinine 0.65      BUN/Creatinine Ratio 9      Calcium 10.2 (*)     eGFR 111     CBC WITH DIFFERENTIAL -  Abnormal    WBC 8.55      RBC 5.59 (*)     Hemoglobin 14.0      Hematocrit 45.4      MCV 81.2      MCH 25.0 (*)     MCHC 30.8 (*)     RDW 13.0      Platelet Count 460 (*)     MPV 10.8      Neutrophils % 75.5 (*)     Lymphocytes % 20.1 (*)     Monocytes % 3.3      Eosinophils % 0.5 (*)     Basophils % 0.4      Immature Granulocytes % 0.2      nRBC, Auto 0.0      Neutrophils, Abs 6.46      Lymphocytes, Absolute 1.72      Monocytes, Absolute 0.28      Eosinophils, Absolute 0.04      Basophils, Absolute 0.03      Immature Granulocytes, Absolute 0.02      nRBC, Absolute 0.00      Diff Type Auto     TROPONIN I - Normal    Troponin I High Sensitivity 4.6     PROTIME-INR - Normal    PT 13.4      INR 1.03     APTT - Normal    PTT 28.8     MAGNESIUM - Normal    Magnesium 2.1     TROPONIN I - Normal    Troponin I High Sensitivity 4.9     CBC W/ AUTO DIFFERENTIAL    Narrative:     The following orders were created for panel order CBC Auto Differential.  Procedure                               Abnormality         Status                     ---------                               -----------         ------                     CBC with Differential[[id number]]       Abnormal            Final result                 Please view results for these tests on the individual orders.          Imaging Results              X-Ray Chest PA And Lateral (Final result)  Result time 09/20/24 10:45:45      Final result by Sage Ellington MD (09/20/24 10:45:45)                   Impression:      No convincing evidence of acute cardiopulmonary disease.      Electronically signed by: Sage Ellington  Date:    09/20/2024  Time:    10:45               Narrative:    EXAMINATION:  XR CHEST PA AND LATERAL    CLINICAL HISTORY:  Chest pain, unspecified    TECHNIQUE:  PA and lateral views of the chest were performed.    COMPARISON:  Chest radiograph performed 03/10/2016, 17:21 hours.    FINDINGS:  Cardiomediastinal contours appear to be within normal  limits    Lungs appear essentially clear.    No definite pneumothorax or large volume pleural effusion.    No acute findings in the visualized abdomen.  Question prior cholecystectomy.    Osseous and soft tissue structures appear without definite acute abnormality.                                       Medications   ketorolac injection 60 mg (has no administration in time range)     Medical Decision Making  43-year-old female presents to the emergency department to be evaluated for pain to her left upper back and shoulder area.  It began yesterday.  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  Her pain increases when she twists.  Denies any shortness of breath or exertional pain.  Labs ordered and reviewed  Potassium is 3.0.  Patient reports that she is supposed to be taking her potassium.  She was out of it for a week and just got a refill.  She reports that she will start taking it again as directed.  EKG ordered, reviewed by , significant for rate of 89, sinus rhythm, no STEMI  X-ray ordered, films reviewed as well as radiologist's interpretation significant for no acute process  Diagnosis: Muscle strain, upper back pain, hypokalemia  Prescribed Motrin and Robaxin  Treated with Toradol      Amount and/or Complexity of Data Reviewed  Labs: ordered.  Radiology: ordered.    Risk  Prescription drug management.                                      Clinical Impression:   Final diagnoses:  [R07.9] Chest pain  [M54.9] Upper back pain (Primary)  [T14.8XXA] Muscle strain  [E87.6] Hypokalemia        ED Disposition Condition    Discharge Stable          ED Prescriptions       Medication Sig Dispense Start Date End Date Auth. Provider    ibuprofen (ADVIL,MOTRIN) 800 MG tablet Take 1 tablet (800 mg total) by mouth 3 (three) times daily. for 5 days 15 tablet 9/20/2024 9/25/2024 Tamiko Hwang, ANGELINA    methocarbamoL (ROBAXIN) 500 MG Tab Take 2 tablets (1,000 mg total) by mouth 3 (three)  times daily. for 5 days 30 tablet 9/20/2024 9/25/2024 Tamiko Hwang, ANGELINA          Follow-up Information    None          Tamiko Hwang, ANGELINA  09/20/24 1259

## 2024-09-20 NOTE — ED TRIAGE NOTES
Presents to ED for complaints of chest pain and left shoulder pain that woke her up at 2230 last night.  Patient denies shortness of breath, nausea or vomiting.

## 2024-09-20 NOTE — DISCHARGE INSTRUCTIONS
Take your potassium as directed. Follow up with your primary care provider in 2 days. Return to the emergency department for any exertional pain, change in the nature of your pain, increase in symptoms or for any other new or worrisome symptoms.

## 2024-09-25 ENCOUNTER — PATIENT MESSAGE (OUTPATIENT)
Dept: OTHER | Facility: OTHER | Age: 44
End: 2024-09-25
Payer: COMMERCIAL

## 2024-09-27 DIAGNOSIS — Z12.11 COLON CANCER SCREENING: Primary | ICD-10-CM

## 2024-09-30 RX ORDER — POLYETHYLENE GLYCOL 3350, SODIUM SULFATE ANHYDROUS, SODIUM BICARBONATE, SODIUM CHLORIDE, POTASSIUM CHLORIDE 236; 22.74; 6.74; 5.86; 2.97 G/4L; G/4L; G/4L; G/4L; G/4L
4 POWDER, FOR SOLUTION ORAL ONCE
Qty: 4000 ML | Refills: 0 | Status: SHIPPED | OUTPATIENT
Start: 2024-09-30 | End: 2024-09-30

## 2024-10-02 ENCOUNTER — PATIENT MESSAGE (OUTPATIENT)
Dept: ADMINISTRATIVE | Facility: OTHER | Age: 44
End: 2024-10-02
Payer: COMMERCIAL

## 2024-10-03 LAB
OHS QRS DURATION: 70 MS
OHS QTC CALCULATION: 425 MS

## 2024-10-08 ENCOUNTER — PATIENT MESSAGE (OUTPATIENT)
Dept: ADMINISTRATIVE | Facility: OTHER | Age: 44
End: 2024-10-08
Payer: COMMERCIAL

## 2024-10-11 ENCOUNTER — ANESTHESIA EVENT (OUTPATIENT)
Dept: GASTROENTEROLOGY | Facility: HOSPITAL | Age: 44
End: 2024-10-11
Payer: COMMERCIAL

## 2024-10-11 ENCOUNTER — HOSPITAL ENCOUNTER (OUTPATIENT)
Dept: GASTROENTEROLOGY | Facility: HOSPITAL | Age: 44
Discharge: HOME | End: 2024-10-11
Attending: INTERNAL MEDICINE | Admitting: INTERNAL MEDICINE
Payer: COMMERCIAL

## 2024-10-11 ENCOUNTER — LAB VISIT (OUTPATIENT)
Dept: LAB | Facility: CLINIC | Age: 44
End: 2024-10-11
Payer: COMMERCIAL

## 2024-10-11 ENCOUNTER — ANESTHESIA (OUTPATIENT)
Dept: GASTROENTEROLOGY | Facility: HOSPITAL | Age: 44
End: 2024-10-11
Payer: COMMERCIAL

## 2024-10-11 VITALS
RESPIRATION RATE: 15 BRPM | BODY MASS INDEX: 47.84 KG/M2 | WEIGHT: 260 LBS | HEIGHT: 62 IN | DIASTOLIC BLOOD PRESSURE: 79 MMHG | HEART RATE: 70 BPM | TEMPERATURE: 98 F | SYSTOLIC BLOOD PRESSURE: 135 MMHG | OXYGEN SATURATION: 100 %

## 2024-10-11 DIAGNOSIS — K63.3 EROSION OF TERMINAL ILEUM: ICD-10-CM

## 2024-10-11 LAB
HAV IGM SER QL: NORMAL
HBV CORE IGM SER QL: NORMAL
HBV SURFACE AG SERPL QL IA: NORMAL
HCV AB SER QL: NORMAL
HIV 1+O+2 AB SERPL QL: NORMAL

## 2024-10-11 PROCEDURE — 80074 ACUTE HEPATITIS PANEL: CPT | Mod: ,,, | Performed by: CLINICAL MEDICAL LABORATORY

## 2024-10-11 PROCEDURE — 37000008 HC ANESTHESIA 1ST 15 MINUTES

## 2024-10-11 PROCEDURE — 45380 COLONOSCOPY AND BIOPSY: CPT | Performed by: INTERNAL MEDICINE

## 2024-10-11 PROCEDURE — 37000009 HC ANESTHESIA EA ADD 15 MINS

## 2024-10-11 PROCEDURE — 88305 TISSUE EXAM BY PATHOLOGIST: CPT | Mod: 26,,, | Performed by: PATHOLOGY

## 2024-10-11 PROCEDURE — 36415 COLL VENOUS BLD VENIPUNCTURE: CPT | Mod: ,,, | Performed by: CLINICAL MEDICAL LABORATORY

## 2024-10-11 PROCEDURE — 45380 COLONOSCOPY AND BIOPSY: CPT | Mod: ,,, | Performed by: INTERNAL MEDICINE

## 2024-10-11 PROCEDURE — 27000284 HC CANNULA NASAL: Performed by: NURSE ANESTHETIST, CERTIFIED REGISTERED

## 2024-10-11 PROCEDURE — 88305 TISSUE EXAM BY PATHOLOGIST: CPT | Mod: TC,SUR | Performed by: INTERNAL MEDICINE

## 2024-10-11 PROCEDURE — 27000716 HC OXISENSOR PROBE, ANY SIZE: Performed by: NURSE ANESTHETIST, CERTIFIED REGISTERED

## 2024-10-11 PROCEDURE — 87389 HIV-1 AG W/HIV-1&-2 AB AG IA: CPT | Mod: ,,, | Performed by: CLINICAL MEDICAL LABORATORY

## 2024-10-11 PROCEDURE — 27201423 OPTIME MED/SURG SUP & DEVICES STERILE SUPPLY

## 2024-10-11 PROCEDURE — 63600175 PHARM REV CODE 636 W HCPCS: Performed by: NURSE ANESTHETIST, CERTIFIED REGISTERED

## 2024-10-11 RX ORDER — PROPOFOL 10 MG/ML
VIAL (ML) INTRAVENOUS
Status: DISCONTINUED | OUTPATIENT
Start: 2024-10-11 | End: 2024-10-11

## 2024-10-11 RX ORDER — LIDOCAINE HYDROCHLORIDE 20 MG/ML
INJECTION, SOLUTION EPIDURAL; INFILTRATION; INTRACAUDAL; PERINEURAL
Status: DISCONTINUED | OUTPATIENT
Start: 2024-10-11 | End: 2024-10-11

## 2024-10-11 RX ORDER — SODIUM CHLORIDE 0.9 % (FLUSH) 0.9 %
10 SYRINGE (ML) INJECTION
Status: DISCONTINUED | OUTPATIENT
Start: 2024-10-11 | End: 2024-10-12 | Stop reason: HOSPADM

## 2024-10-11 RX ADMIN — PROPOFOL 100 MG: 10 INJECTION, EMULSION INTRAVENOUS at 08:10

## 2024-10-11 RX ADMIN — PROPOFOL 125 MG: 10 INJECTION, EMULSION INTRAVENOUS at 09:10

## 2024-10-11 RX ADMIN — PROPOFOL 100 MG: 10 INJECTION, EMULSION INTRAVENOUS at 09:10

## 2024-10-11 RX ADMIN — LIDOCAINE HYDROCHLORIDE 100 MG: 20 INJECTION, SOLUTION INTRAVENOUS at 08:10

## 2024-10-11 NOTE — ANESTHESIA POSTPROCEDURE EVALUATION
Anesthesia Post Evaluation    Patient: Rosa Rutledge    Procedure(s) Performed: colonoscopy    Final Anesthesia Type: MAC      Patient location during evaluation: GI PACU  Patient participation: Yes- Able to Participate  Level of consciousness: awake and alert  Post-procedure vital signs: reviewed and stable  Pain management: adequate  Airway patency: patent    PONV status at discharge: No PONV  Anesthetic complications: no      Cardiovascular status: blood pressure returned to baseline and hemodynamically stable  Respiratory status: spontaneous ventilation  Hydration status: euvolemic  Follow-up not needed.  Comments: Refer to nursing notes for pain/chelsea score upon discharge from recovery.              Vitals Value Taken Time   /59 10/11/24 1121   Temp 98.0 10/11/24 1435   Pulse 69 10/11/24 1122   Resp 14 10/11/24 1122   SpO2 97 % 10/11/24 1122   Vitals shown include unfiled device data.      Event Time   Out of Recovery 10:20:51         Pain/Chelsea Score: Chelsea Score: 9 (10/11/2024  9:32 AM)

## 2024-10-11 NOTE — TRANSFER OF CARE
"Anesthesia Transfer of Care Note    Patient: Rosa Rutledge    Procedure(s) Performed: * No procedures listed *    Patient location: GI    Anesthesia Type: MAC    Transport from OR: Transported from OR on room air with adequate spontaneous ventilation. Continuous ECG monitoring in transport. Continuous SpO2 monitoring in transport    Post pain: adequate analgesia    Post assessment: no apparent anesthetic complications    Post vital signs: stable    Level of consciousness: sedated and responds to stimulation    Nausea/Vomiting: no nausea/vomiting    Complications: none    Transfer of care protocol was followedComments: Good SV continue, NAD, VSS, RTRN      Last vitals: Visit Vitals  BP (!) 99/59 (BP Location: Right forearm, Patient Position: Lying)   Pulse 82   Temp 36.6 °C (97.8 °F) (Oral)   Resp (!) 22   Ht 5' 2" (1.575 m)   Wt 117.9 kg (260 lb)   SpO2 100%   Breastfeeding No   BMI 47.55 kg/m²     "

## 2024-10-11 NOTE — H&P
Gastroenterology Pre-procedure H&P    History of Present Illness    Rosa Rutledge is a 43 y.o. female that  has a past medical history of Asthma, COVID, Hypertension, and Kidney stone.     Patient with chronic inflammation with NSAID use on prior colonoscopy here for repeat exam. Has been off NSAIDs        Past Medical History:   Diagnosis Date    Asthma     COVID     Hypertension     Kidney stone        Past Surgical History:   Procedure Laterality Date    ADENOIDECTOMY      BREAST SURGERY Bilateral     reduction    CHOLECYSTECTOMY  11/12/2020    CYSTOURETEROSCOPY, WITH HOLMIUM LASER LITHOTRIPSY OF URETERAL CALCULUS AND STENT INSERTION Right 5/16/2024    Procedure: CYSTOURETEROSCOPY WITH HOLMIUM LASER LITHOTRIPSY OF URETERAL CALCULUS, STENT INSERTION AND INDICATED PROCEDURES;  Surgeon: Luc Howard Jr., MD;  Location: [facility];  Service: Urology;  Laterality: Right;    HYSTERECTOMY  2016    Full    REDUCTION OF BOTH BREASTS  01/09/2020    TONSILLECTOMY      TOTAL REDUCTION MAMMOPLASTY      TUBAL LIGATION         Family History   Problem Relation Name Age of Onset    Diabetes Mother      Hypertension Mother      Diabetes Maternal Grandmother      Hypertension Maternal Grandmother      Asthma Maternal Grandmother      Asthma Sister      Hypertension Sister         Social History     Socioeconomic History    Marital status:     Number of children: 2   Occupational History    Occupation: [employer]      Employer: [employer]   Tobacco Use    Smoking status: Never    Smokeless tobacco: Never   Substance and Sexual Activity    Alcohol use: Never    Drug use: Never    Sexual activity: Yes     Partners: Male   Social History Narrative    Lives at home with  and second child (13)    No smoking in home , No smoking in home      Social Drivers of Health     Financial Resource Strain: Low Risk  (8/19/2024)    Overall Financial Resource Strain (CARDIA)     Difficulty of Paying Living Expenses: Not hard at all   Food  Insecurity: No Food Insecurity (8/19/2024)    Hunger Vital Sign     Worried About Running Out of Food in the Last Year: Never true     Ran Out of Food in the Last Year: Never true   Physical Activity: Sufficiently Active (8/19/2024)    Exercise Vital Sign     Days of Exercise per Week: 5 days     Minutes of Exercise per Session: 30 min   Stress: No Stress Concern Present (8/19/2024)    [place] [employer] - Occupational Stress Questionnaire     Feeling of Stress : Not at all   Housing Stability: Unknown (8/19/2024)    Housing Stability Vital Sign     Unable to Pay for Housing in the Last Year: No       Current Outpatient Medications   Medication Sig Dispense Refill    budesonide 180mcg (PULMICORT FLEXHALER) 180 mcg/actuation AePB Inhale 2 puffs into the lungs every 4 to 6 hours as needed.      ergocalciferol (ERGOCALCIFEROL) 50,000 unit Cap TAKE 1 CAPSULE BY MOUTH TWICE A WEEK 8 capsule 0    gabapentin (NEURONTIN) 300 MG capsule Take 300 mg by mouth 3 (three) times daily.      LINZESS 145 mcg Cap capsule Take 1 capsule (145 mcg total) by mouth before breakfast. 90 capsule 3    losartan (COZAAR) 50 MG tablet Take 1 tablet (50 mg total) by mouth once daily. 90 tablet 3    NURTEC 75 mg odt DISSOLVE 1 TABLET ON THE TONGUE AT ONSET HEADACHE 16 tablet 2    potassium chloride (KLOR-CON) 10 MEQ TbSR Take 1 tablet (10 mEq total) by mouth once daily. 90 tablet 3    promethazine (PHENERGAN) 25 MG tablet TAKE 1 TABLET BY MOUTH EVERY 8 HOURS AS NEEDED FOR MIGRAINE 20 tablet 2    semaglutide, weight loss, (WEGOVY) 0.5 mg/0.5 mL PnIj Inject 0.5 mg into the skin every 7 days. 2 mL 0    [START ON 10/18/2024] semaglutide, weight loss, (WEGOVY) 1 mg/0.5 mL PnIj Inject 1 mg into the skin every 7 days. 2 mL 0     No current facility-administered medications for this encounter.       Review of patient's allergies indicates:  No Known Allergies    Objective:  There were no vitals filed for this visit.     GEN: normal  appearing, NAD, AAO x3  HENT: NCAT, anicteric, OP benign  CV: normal rate, regular rhythm  RESP: NABS, symmetric rise, unlabored  ABD: soft, ND, no guarding or TTP  SKIN: warm and dry  NEURO: grossly afocal    Assessment and Plan:    Proceed with:    Colonoscopy for abnormal prior colonoscopy    Rolf Padilla MD  Gastroenterology

## 2024-10-11 NOTE — ANESTHESIA PREPROCEDURE EVALUATION
10/11/2024  Rosa Rutledge is a 43 y.o., female.      Pre-op Assessment    I have reviewed the Patient Summary Reports.     I have reviewed the Nursing Notes. I have reviewed the NPO Status.   I have reviewed the Medications.     Review of Systems  Anesthesia Hx:  No problems with previous Anesthesia                Social:  Non-Smoker, No Alcohol Use       Cardiovascular:     Hypertension, well controlled                                        Pulmonary:    Asthma mild                   Renal/:  Chronic Renal Disease                Musculoskeletal:  Arthritis               Neurological:    Neuromuscular Disease,  Headaches                                 Endocrine:        Morbid Obesity / BMI > 40      Physical Exam  General: Well nourished, Cooperative, Alert and Oriented    Airway:  Mallampati: III   Mouth Opening: Normal  TM Distance: < 4 cm  Tongue: Large  Neck ROM: Normal ROM    Dental:  Intact        Anesthesia Plan  Type of Anesthesia, risks & benefits discussed:    Anesthesia Type: Gen Natural Airway, MAC  Intra-op Monitoring Plan: Standard ASA Monitors  Post Op Pain Control Plan: multimodal analgesia and IV/PO Opioids PRN  Induction:  IV  Informed Consent: Informed consent signed with the Patient and all parties understand the risks and agree with anesthesia plan.  All questions answered. Patient consented to blood products? Yes  ASA Score: 3  Day of Surgery Review of History & Physical: I have interviewed and examined the patient. I have reviewed the patient's H&P dated: There are no significant changes.     Ready For Surgery From Anesthesia Perspective.     .  Past Medical History:   Diagnosis Date    Asthma     COVID     Hypertension     Kidney stone        Past Surgical History:   Procedure Laterality Date    ADENOIDECTOMY      BREAST SURGERY Bilateral     reduction    CHOLECYSTECTOMY   11/12/2020    CYSTOURETEROSCOPY, WITH HOLMIUM LASER LITHOTRIPSY OF URETERAL CALCULUS AND STENT INSERTION Right 5/16/2024    Procedure: CYSTOURETEROSCOPY WITH HOLMIUM LASER LITHOTRIPSY OF URETERAL CALCULUS, STENT INSERTION AND INDICATED PROCEDURES;  Surgeon: Luc Howard Jr., MD;  Location: [facility];  Service: Urology;  Laterality: Right;    HYSTERECTOMY  2016    Full    REDUCTION OF BOTH BREASTS  01/09/2020    TONSILLECTOMY      TOTAL REDUCTION MAMMOPLASTY      TUBAL LIGATION         Family History   Problem Relation Name Age of Onset    Diabetes Mother      Hypertension Mother      Diabetes Maternal Grandmother      Hypertension Maternal Grandmother      Asthma Maternal Grandmother      Asthma Sister      Hypertension Sister         Social History     Socioeconomic History    Marital status:     Number of children: 2   Occupational History    Occupation: [employer]      Employer: [employer]   Tobacco Use    Smoking status: Never    Smokeless tobacco: Never   Substance and Sexual Activity    Alcohol use: Never    Drug use: Never    Sexual activity: Yes     Partners: Male   Social History Narrative    Lives at home with  and second child (13)    No smoking in home , No smoking in home      Social Drivers of Health     Financial Resource Strain: Low Risk  (8/19/2024)    Overall Financial Resource Strain (CARDIA)     Difficulty of Paying Living Expenses: Not hard at all   Food Insecurity: No Food Insecurity (8/19/2024)    Hunger Vital Sign     Worried About Running Out of Food in the Last Year: Never true     Ran Out of Food in the Last Year: Never true   Physical Activity: Sufficiently Active (8/19/2024)    Exercise Vital Sign     Days of Exercise per Week: 5 days     Minutes of Exercise per Session: 30 min   Stress: No Stress Concern Present (8/19/2024)    [place] [employer] - Occupational Stress Questionnaire     Feeling of Stress : Not at all   Housing Stability: Unknown (8/19/2024)     Housing Stability Vital Sign     Unable to Pay for Housing in the Last Year: No       Current Outpatient Medications   Medication Sig Dispense Refill    budesonide 180mcg (PULMICORT FLEXHALER) 180 mcg/actuation AePB Inhale 2 puffs into the lungs every 4 to 6 hours as needed.      ergocalciferol (ERGOCALCIFEROL) 50,000 unit Cap TAKE 1 CAPSULE BY MOUTH TWICE A WEEK 8 capsule 0    gabapentin (NEURONTIN) 300 MG capsule Take 300 mg by mouth 3 (three) times daily.      LINZESS 145 mcg Cap capsule Take 1 capsule (145 mcg total) by mouth before breakfast. 90 capsule 3    losartan (COZAAR) 50 MG tablet Take 1 tablet (50 mg total) by mouth once daily. 90 tablet 3    NURTEC 75 mg odt DISSOLVE 1 TABLET ON THE TONGUE AT ONSET HEADACHE 16 tablet 2    potassium chloride (KLOR-CON) 10 MEQ TbSR Take 1 tablet (10 mEq total) by mouth once daily. 90 tablet 3    promethazine (PHENERGAN) 25 MG tablet TAKE 1 TABLET BY MOUTH EVERY 8 HOURS AS NEEDED FOR MIGRAINE 20 tablet 2    semaglutide, weight loss, (WEGOVY) 0.5 mg/0.5 mL PnIj Inject 0.5 mg into the skin every 7 days. 2 mL 0    [START ON 10/18/2024] semaglutide, weight loss, (WEGOVY) 1 mg/0.5 mL PnIj Inject 1 mg into the skin every 7 days. 2 mL 0     No current facility-administered medications for this encounter.       Review of patient's allergies indicates:  No Known Allergies

## 2024-10-11 NOTE — DISCHARGE INSTRUCTIONS
Procedure Date  10/11/24     Impression  Overall Impression:   Severe erythematous, ulcerated mucosa with erosion in the terminal ileum, consistent with Crohn's disease; performed cold forceps biopsy to rule out IBD  Stricture in the anal region  The ileocecal valve, appendiceal orifice, cecum, ascending colon, transverse colon, descending colon and sigmoid colon appeared normal.  (grade 1) hemorrhoids        Recommendation  Await pathology results  Given the severity and progression of inflammation, I suspect this is stricturing Crohn's disease; anal stricture could be related to scarring from anal fissure or inflammation from crohn's as well  Pretreatment labs ordered - please have these drawn at any [facility] lab    Please schedule follow up in clinic with ANGELINA Morales in 2 weeks - OCTOBER 24, 2024 @ 1:00 PM    plan to start TNF and Imuran combination therapy if biopsies confirm chronic inflammation       Outcome of procedure: successful Colonoscopy  Disposition: patient to recovery following procedure; discharge to home when appropriate parameters met  Provisions for follow up: please call my office for any unexpected symptoms like chest or abdominal pain or bleeding following your procedure.    No driving today, no operating heavy machinery, no signing any legal documents until tomorrow.    Drink lots of fluids, resume regular diet.  Take your normal medications.     Please call our office for any nausea, vomiting or abdominal pain.

## 2024-10-12 LAB — HBV CORE AB SERPL QL IA: NEGATIVE

## 2024-10-14 NOTE — PROGRESS NOTES
Biopsies confirm Crohn's disease. Pretreatment labs ordered and pending. Plan for combination therapy given stricturing disease.

## 2024-10-17 DIAGNOSIS — K50.018 CROHN'S DISEASE OF SMALL INTESTINE WITH OTHER COMPLICATION: Primary | ICD-10-CM

## 2024-10-17 RX ORDER — AZATHIOPRINE 100 MG/1
100 TABLET ORAL DAILY
Qty: 90 TABLET | Refills: 3 | Status: SHIPPED | OUTPATIENT
Start: 2024-10-17 | End: 2025-10-17

## 2024-10-18 ENCOUNTER — PATIENT MESSAGE (OUTPATIENT)
Dept: ADMINISTRATIVE | Facility: OTHER | Age: 44
End: 2024-10-18
Payer: COMMERCIAL

## 2024-10-23 DIAGNOSIS — G43.119 INTRACTABLE MIGRAINE WITH AURA WITHOUT STATUS MIGRAINOSUS: ICD-10-CM

## 2024-10-23 RX ORDER — RIMEGEPANT SULFATE 75 MG/75MG
TABLET, ORALLY DISINTEGRATING ORAL
Qty: 16 TABLET | Refills: 2 | Status: SHIPPED | OUTPATIENT
Start: 2024-10-23

## 2024-10-24 ENCOUNTER — OFFICE VISIT (OUTPATIENT)
Dept: GASTROENTEROLOGY | Facility: CLINIC | Age: 44
End: 2024-10-24
Payer: COMMERCIAL

## 2024-10-24 ENCOUNTER — PATIENT MESSAGE (OUTPATIENT)
Dept: GASTROENTEROLOGY | Facility: CLINIC | Age: 44
End: 2024-10-24
Payer: COMMERCIAL

## 2024-10-24 VITALS
SYSTOLIC BLOOD PRESSURE: 158 MMHG | HEART RATE: 86 BPM | DIASTOLIC BLOOD PRESSURE: 96 MMHG | WEIGHT: 271.38 LBS | OXYGEN SATURATION: 99 % | BODY MASS INDEX: 49.94 KG/M2 | HEIGHT: 62 IN

## 2024-10-24 DIAGNOSIS — K59.04 CHRONIC IDIOPATHIC CONSTIPATION: ICD-10-CM

## 2024-10-24 DIAGNOSIS — K58.1 IRRITABLE BOWEL SYNDROME WITH CONSTIPATION: ICD-10-CM

## 2024-10-24 DIAGNOSIS — K50.018 CROHN'S DISEASE OF SMALL INTESTINE WITH OTHER COMPLICATION: Primary | ICD-10-CM

## 2024-10-24 PROCEDURE — 3080F DIAST BP >= 90 MM HG: CPT | Mod: ,,,

## 2024-10-24 PROCEDURE — 4010F ACE/ARB THERAPY RXD/TAKEN: CPT | Mod: ,,,

## 2024-10-24 PROCEDURE — 99215 OFFICE O/P EST HI 40 MIN: CPT | Mod: S$PBB,,,

## 2024-10-24 PROCEDURE — 1160F RVW MEDS BY RX/DR IN RCRD: CPT | Mod: ,,,

## 2024-10-24 PROCEDURE — 3077F SYST BP >= 140 MM HG: CPT | Mod: ,,,

## 2024-10-24 PROCEDURE — 1159F MED LIST DOCD IN RCRD: CPT | Mod: ,,,

## 2024-10-24 PROCEDURE — 99999 PR PBB SHADOW E&M-EST. PATIENT-LVL IV: CPT | Mod: PBBFAC,,,

## 2024-10-24 PROCEDURE — 99214 OFFICE O/P EST MOD 30 MIN: CPT | Mod: PBBFAC

## 2024-10-24 PROCEDURE — 3008F BODY MASS INDEX DOCD: CPT | Mod: ,,,

## 2024-10-24 RX ORDER — EPINEPHRINE 0.3 MG/.3ML
0.3 INJECTION SUBCUTANEOUS ONCE AS NEEDED
OUTPATIENT
Start: 2024-10-31

## 2024-10-24 RX ORDER — ACETAMINOPHEN 325 MG/1
650 TABLET ORAL
OUTPATIENT
Start: 2024-10-31

## 2024-10-24 RX ORDER — DIPHENHYDRAMINE HYDROCHLORIDE 50 MG/ML
50 INJECTION INTRAMUSCULAR; INTRAVENOUS ONCE AS NEEDED
OUTPATIENT
Start: 2024-10-31

## 2024-10-24 RX ORDER — HEPARIN 100 UNIT/ML
500 SYRINGE INTRAVENOUS
OUTPATIENT
Start: 2024-10-31

## 2024-10-24 RX ORDER — IPRATROPIUM BROMIDE AND ALBUTEROL SULFATE 2.5; .5 MG/3ML; MG/3ML
3 SOLUTION RESPIRATORY (INHALATION)
OUTPATIENT
Start: 2024-10-31

## 2024-10-24 RX ORDER — SODIUM CHLORIDE 0.9 % (FLUSH) 0.9 %
10 SYRINGE (ML) INJECTION
OUTPATIENT
Start: 2024-10-31

## 2024-10-24 RX ORDER — LINACLOTIDE 145 UG/1
145 CAPSULE, GELATIN COATED ORAL
Qty: 90 CAPSULE | Refills: 3 | Status: SHIPPED | OUTPATIENT
Start: 2024-10-24 | End: 2024-10-24

## 2024-10-24 RX ORDER — LINACLOTIDE 145 UG/1
290 CAPSULE, GELATIN COATED ORAL
Qty: 180 CAPSULE | Refills: 3 | Status: SHIPPED | OUTPATIENT
Start: 2024-10-24 | End: 2025-10-24

## 2024-10-24 NOTE — PROGRESS NOTES
Gastroenterology Clinic Note    Patient ID: [id number]   Referring MD: No ref. provider found   Chief Complaint:   Chief Complaint   Patient presents with    Follow-up     2 week per Randy       History of Present Illness     Rosa Rutledge is an 44 y.o.  female who is referred for follow-up of Crohn's Disease. Diagnosis was made by colonoscopy. Onset was 2024. Disease has involved terminal ileum. The patient has had no surgery for treatment of their IBD. This patient does not have a family history of IBD or colon cancer.      This historical paragraph has been reviewed and updated as necessary from prior clinic notes.     This is the patient's initial visit after diagnosis 2 weeks ago.  Pretreatment labs have been obtained with a normal TPMT.  Imuran was sent to her pharmacy and she plans to start this medication today.  The patient is currently having 1 bowel movements per day which are  formed .  She has chronic constipation and abdominal pain that is relieved with bowel movement.  She has been taking Linzess 290 mcg for her symptoms and this helps.  The patient currently denies significant abdominal pain or discomfort.  The patient does not have fever and chills The patient does not have night sweats and nocturnal awakening.  The patient does not have weight loss. The patient does not have nausea, vomiting, and heartburn.  The patient does not have extraintestinal symptoms of oral ulcers, joint pain, and rash.     Last colonoscopy was 10/11/2024 with severe erythematous, ulcerated mucosa with erosion in the terminal ileum, consistent with Crohn's disease     Final Diagnosis   A. Terminal ileum, biopsies:  - Moderately active chronic ileitis  - No granulomas identified       The patient has not been on corticosteroids > or = 10mg prednisone (or equivalent) for 60 or more days.    The patient has been treated with the following medications for IBD:  None    Patient's current therapy is:   None    Review of Systems   Constitutional:  Negative for weight loss.   Gastrointestinal:  Positive for constipation. Negative for abdominal pain, blood in stool, diarrhea, heartburn, melena, nausea and vomiting.       Past Medical History      Past Medical History:   Diagnosis Date    Asthma     COVID     Hypertension     Kidney stone        Past Surgical History     Past Surgical History:   Procedure Laterality Date    ADENOIDECTOMY      BREAST SURGERY Bilateral     reduction    CHOLECYSTECTOMY  11/12/2020    CYSTOURETEROSCOPY, WITH HOLMIUM LASER LITHOTRIPSY OF URETERAL CALCULUS AND STENT INSERTION Right 5/16/2024    Procedure: CYSTOURETEROSCOPY WITH HOLMIUM LASER LITHOTRIPSY OF URETERAL CALCULUS, STENT INSERTION AND INDICATED PROCEDURES;  Surgeon: Luc Howard Jr., MD;  Location: [facility];  Service: Urology;  Laterality: Right;    HYSTERECTOMY  2016    Full    REDUCTION OF BOTH BREASTS  01/09/2020    TONSILLECTOMY      TOTAL REDUCTION MAMMOPLASTY      TUBAL LIGATION         Allergies   Review of patient's allergies indicates:  No Known Allergies    Immunization History     Immunization History   Administered Date(s) Administered    COVID-19 Vaccine 01/11/2021, 02/08/2021    COVID-19, MRNA, LN-S, PF (MODERNA FULL 0.5 ML DOSE) 11/17/2021    Influenza 12/16/2021    Influenza - Quadrivalent - PF *Preferred* (6 months and older) 10/21/2022, 10/27/2023    Influenza - Trivalent - Fluarix, Flulaval, Fluzone, Afluria - PF 10/09/2024       Past Family History      Family History   Problem Relation Name Age of Onset    Diabetes Mother      Hypertension Mother      Diabetes Maternal Grandmother      Hypertension Maternal Grandmother      Asthma Maternal Grandmother      Asthma Sister      Hypertension Sister         Past Social History      Social History     Socioeconomic History    Marital status:     Number of children: 2   Occupational History    Occupation: [employer] [facility] [place]      Employer: [employer]   Tobacco Use    Smoking  status: Never    Smokeless tobacco: Never   Substance and Sexual Activity    Alcohol use: Never    Drug use: Never    Sexual activity: Yes     Partners: Male   Social History Narrative    Lives at home with  and second child (13)    No smoking in home , No smoking in home      Social Drivers of Health     Financial Resource Strain: Low Risk  (8/19/2024)    Overall Financial Resource Strain (CARDIA)     Difficulty of Paying Living Expenses: Not hard at all   Food Insecurity: No Food Insecurity (8/19/2024)    Hunger Vital Sign     Worried About Running Out of Food in the Last Year: Never true     Ran Out of Food in the Last Year: Never true   Physical Activity: Sufficiently Active (8/19/2024)    Exercise Vital Sign     Days of Exercise per Week: 5 days     Minutes of Exercise per Session: 30 min   Stress: No Stress Concern Present (8/19/2024)    [employer] [place] [employer] - Occupational Stress Questionnaire     Feeling of Stress : Not at all   Housing Stability: Unknown (8/19/2024)    Housing Stability Vital Sign     Unable to Pay for Housing in the Last Year: No       Current Medications     Outpatient Medications Marked as Taking for the 10/24/24 encounter (Office Visit) with Shruthi Winslow FNP   Medication Sig Dispense Refill    azaTHIOprine (IMURAN) 100 mg tablet Take 1 tablet (100 mg total) by mouth once daily. 90 tablet 3    budesonide 180mcg (PULMICORT FLEXHALER) 180 mcg/actuation AePB Inhale 2 puffs into the lungs every 4 to 6 hours as needed.      ergocalciferol (ERGOCALCIFEROL) 50,000 unit Cap TAKE 1 CAPSULE BY MOUTH TWICE A WEEK 8 capsule 0    gabapentin (NEURONTIN) 300 MG capsule Take 300 mg by mouth 3 (three) times daily.      losartan (COZAAR) 50 MG tablet Take 1 tablet (50 mg total) by mouth once daily. 90 tablet 3    NURTEC 75 mg odt DISSOLVE 1 TABLET ON THE TONGUE AT ONSET HEADACHE 16 tablet 2    potassium chloride (KLOR-CON) 10 MEQ TbSR Take 1 tablet (10 mEq total) by mouth once  "daily. 90 tablet 3    promethazine (PHENERGAN) 25 MG tablet TAKE 1 TABLET BY MOUTH EVERY 8 HOURS AS NEEDED FOR MIGRAINE 20 tablet 2    semaglutide, weight loss, (WEGOVY) 1 mg/0.5 mL PnIj Inject 1 mg into the skin every 7 days. 2 mL 0    [DISCONTINUED] LINZESS 145 mcg Cap capsule Take 1 capsule (145 mcg total) by mouth before breakfast. 90 capsule 3        I have reviewed the current medications, allergies, vital signs, past medical and surgical history, family medical history, and social history for this encounter and agree with all findings.    OBJECTIVE    Physical Exam    BP (!) 158/96   Pulse 86   Ht 5' 2" (1.575 m)   Wt 123.1 kg (271 lb 6.4 oz)   SpO2 99%   BMI 49.64 kg/m²   GEN: Well appearing, cooperative, NAD  NECK: Supple, no LAD  CV: Normal rate  RESP: Unlabored  ABD: ND, no guarding  EXT: No clubbing, cyanosis, or edema  SKIN: Warm and dry  NEURO: AAO x4.     LABS    CBC (with or without Differential):   Lab Results   Component Value Date    WBC 8.55 09/20/2024    HGB 14.0 09/20/2024    HCT 45.4 09/20/2024    MCV 81.2 09/20/2024    MCH 25.0 (L) 09/20/2024    MCHC 30.8 (L) 09/20/2024    RDW 13.0 09/20/2024     (H) 09/20/2024    MPV 10.8 09/20/2024    NEUTOPHILPCT 75.5 (H) 09/20/2024    DIFFTYPE Auto 09/20/2024     BMP/CMP:   Lab Results   Component Value Date     09/20/2024    K 3.0 (L) 09/20/2024     09/20/2024    CO2 28 09/20/2024    BUN 6 (L) 09/20/2024    CREATININE 0.65 09/20/2024     09/20/2024    CALCIUM 10.2 (H) 09/20/2024    ALBUMIN 3.0 (L) 05/15/2024    AST 29 05/15/2024    ALT 32 05/15/2024    ALKPHOS 89 05/15/2024    MG 2.1 09/20/2024        IMAGING  No pertinent imaging available.    ASSESSMENT  Rosa Rutledge is a 44 y.o. AAF with history of chronic constipation and hypertension who is referred for newly diagnosed Crohn's disease.    1. Crohn's disease of small intestine with other complication    2. Chronic idiopathic constipation           PLAN    - Plan for " combination therapy given stricturing disease; patient plans to start Imuran today; we discussed treatment options in clinic today and patient wishes to proceed with Remicade  - will obtain CBC and liver chemistries every week for 4 weeks, then monthly for 3 months, then every 3 months while on Imuran (order monthly labs at follow-up)  - repeat colonoscopy to assess mucosal healing in 6 months after initiation of therapy  - refill Linzess 290 mcg daily for IBS C      I have counseled the patient on the risks of Azathioprine (Imuran) including GI intolerance (N/V/D), Pancreatitis, immunosuppression with risk of infection (bacterial, vital, protozoal, opportunistic, reactivation of latent infection), increased risk of malignancy (Lymphoma, HSTCL, NMSC - yearly dermatology evaluation, HLH), Hematologic toxicity (leukopenia, thrombocytopenia, anemia, pancytopenia), Hepatotoxicity.     I have counseled the patient on the risks of Remicade TNFa including serious infections (TB, bacterial, fungal, opportunistic, etc) due to immunosuppression, malignancies such as Lymphomas (HL, NHL, HSTCL), demyelinating disease, lupus like syndrome, dermatologic reactions (psoriasiform eruption), infusion reactions, New/worsening of heart failure, Hep B/TB reactivation, Hepatotoxicity. Avoid TNFa use in patients with demyelinating disease (MS, optic neuritis, GBS, CIDP, myelitis etc), heart failure, HIV, Seizure disorders, h/o transplant.       I have reviewed the IBD health maintenance as below.      IBD Health Maintenance:  -Prevnar 13: Recommend   -Pneumovax 23: Recommend 8 weeks after Prevnar 13  -Flu Shot: Recommend annually  -Shingrix: Recommend    -Hepatitis A/Hepatitis B: Check antibody and if not immune will vaccinate  -Annual pap smear: Recommend    -Annual skin exam: Recommend   -Colon cancer screening: Colonoscopy:  every 1-2 years for surveillance once in endoscopic remission  -DEXA scan: Recommend  -Tobacco: Avoid  -Should  avoid NSAIDs and narcotics, use only Tylenol for pain relief.     There are no Patient Instructions on file for this visit.      No orders of the defined types were placed in this encounter.        The risks and benefits of my recommendations, as well as other treatment options were discussed with the patient today. All questions were answered.    45 minutes of total time spent on the encounter, which includes face to face time and non-face to face time preparing to see the patient (eg, review of tests), obtaining and/or reviewing separately obtained history, documenting clinical information in the electronic or other health record, Independently interpreting results (not separately reported) and communicating results to the patient/family/caregiver, or care coordination (not separately reported).        Shruthi Winslow, GEREMIASP/ACNP  [facility]

## 2024-10-25 DIAGNOSIS — K50.018 CROHN'S DISEASE OF SMALL INTESTINE WITH OTHER COMPLICATION: Primary | ICD-10-CM

## 2024-10-25 RX ORDER — ADALIMUMAB 40MG/0.8ML
40 KIT SUBCUTANEOUS
Qty: 2 PEN | Refills: 11 | Status: SHIPPED | OUTPATIENT
Start: 2024-10-25 | End: 2025-10-25

## 2024-10-25 RX ORDER — ADALIMUMAB 80MG/0.8ML
KIT SUBCUTANEOUS
Qty: 3 PEN | Refills: 0 | Status: SHIPPED | OUTPATIENT
Start: 2024-10-25

## 2024-10-30 DIAGNOSIS — K50.018 CROHN'S DISEASE OF SMALL INTESTINE WITH OTHER COMPLICATION: Primary | ICD-10-CM

## 2024-10-30 RX ORDER — ADALIMUMAB 40MG/0.8ML
40 KIT SUBCUTANEOUS
Qty: 2 PEN | Refills: 11 | Status: ACTIVE | OUTPATIENT
Start: 2024-10-30 | End: 2025-10-30

## 2024-10-30 RX ORDER — ADALIMUMAB 80MG/0.8ML
KIT SUBCUTANEOUS
Qty: 3 PEN | Refills: 0 | Status: ACTIVE | OUTPATIENT
Start: 2024-10-30

## 2024-10-31 ENCOUNTER — OFFICE VISIT (OUTPATIENT)
Dept: OTOLARYNGOLOGY | Facility: CLINIC | Age: 44
End: 2024-10-31
Payer: COMMERCIAL

## 2024-10-31 VITALS — BODY MASS INDEX: 49.87 KG/M2 | HEIGHT: 62 IN | WEIGHT: 271 LBS

## 2024-10-31 DIAGNOSIS — H65.92 SOM (SECRETORY OTITIS MEDIA), LEFT: ICD-10-CM

## 2024-10-31 DIAGNOSIS — H60.552 ACUTE REACTIVE OTITIS EXTERNA OF LEFT EAR: Primary | ICD-10-CM

## 2024-10-31 PROCEDURE — 3008F BODY MASS INDEX DOCD: CPT | Mod: ,,, | Performed by: OTOLARYNGOLOGY

## 2024-10-31 PROCEDURE — 99999 PR PBB SHADOW E&M-EST. PATIENT-LVL III: CPT | Mod: PBBFAC,,, | Performed by: OTOLARYNGOLOGY

## 2024-10-31 PROCEDURE — 99213 OFFICE O/P EST LOW 20 MIN: CPT | Mod: PBBFAC | Performed by: OTOLARYNGOLOGY

## 2024-10-31 PROCEDURE — 4010F ACE/ARB THERAPY RXD/TAKEN: CPT | Mod: ,,, | Performed by: OTOLARYNGOLOGY

## 2024-10-31 PROCEDURE — 1159F MED LIST DOCD IN RCRD: CPT | Mod: ,,, | Performed by: OTOLARYNGOLOGY

## 2024-10-31 PROCEDURE — 99214 OFFICE O/P EST MOD 30 MIN: CPT | Mod: S$PBB,,, | Performed by: OTOLARYNGOLOGY

## 2024-10-31 PROCEDURE — 1160F RVW MEDS BY RX/DR IN RCRD: CPT | Mod: ,,, | Performed by: OTOLARYNGOLOGY

## 2024-10-31 RX ORDER — NEOMYCIN SULFATE, POLYMYXIN B SULFATE AND HYDROCORTISONE 10; 3.5; 1 MG/ML; MG/ML; [USP'U]/ML
3 SUSPENSION/ DROPS AURICULAR (OTIC) 2 TIMES DAILY
Qty: 10 ML | Refills: 0 | Status: SHIPPED | OUTPATIENT
Start: 2024-10-31

## 2024-10-31 RX ORDER — AMOXICILLIN 500 MG/1
500 CAPSULE ORAL 2 TIMES DAILY
Qty: 28 CAPSULE | Refills: 0 | Status: SHIPPED | OUTPATIENT
Start: 2024-10-31

## 2024-11-04 ENCOUNTER — TELEPHONE (OUTPATIENT)
Dept: GASTROENTEROLOGY | Facility: CLINIC | Age: 44
End: 2024-11-04
Payer: COMMERCIAL

## 2024-11-04 ENCOUNTER — HOSPITAL ENCOUNTER (EMERGENCY)
Facility: HOSPITAL | Age: 44
Discharge: HOME | End: 2024-11-04
Payer: COMMERCIAL

## 2024-11-04 VITALS
TEMPERATURE: 100 F | SYSTOLIC BLOOD PRESSURE: 113 MMHG | HEIGHT: 61 IN | RESPIRATION RATE: 21 BRPM | HEART RATE: 97 BPM | DIASTOLIC BLOOD PRESSURE: 75 MMHG | OXYGEN SATURATION: 98 % | WEIGHT: 262 LBS | BODY MASS INDEX: 49.47 KG/M2

## 2024-11-04 DIAGNOSIS — R00.0 TACHYCARDIA: ICD-10-CM

## 2024-11-04 DIAGNOSIS — R10.9 ABDOMINAL PAIN, UNSPECIFIED ABDOMINAL LOCATION: ICD-10-CM

## 2024-11-04 DIAGNOSIS — R19.7 DIARRHEA, UNSPECIFIED TYPE: ICD-10-CM

## 2024-11-04 DIAGNOSIS — N39.0 URINARY TRACT INFECTION WITHOUT HEMATURIA, SITE UNSPECIFIED: Primary | ICD-10-CM

## 2024-11-04 LAB
ALBUMIN SERPL BCP-MCNC: 2.8 G/DL (ref 3.5–5)
ALBUMIN/GLOB SERPL: 0.6 {RATIO}
ALP SERPL-CCNC: 149 U/L (ref 39–100)
ALT SERPL W P-5'-P-CCNC: 63 U/L (ref 13–56)
ANION GAP SERPL CALCULATED.3IONS-SCNC: 14 MMOL/L (ref 7–16)
AST SERPL W P-5'-P-CCNC: 72 U/L (ref 15–37)
BACTERIA #/AREA URNS HPF: ABNORMAL /HPF
BASOPHILS # BLD AUTO: 0.09 K/UL (ref 0–0.2)
BASOPHILS NFR BLD AUTO: 0.5 % (ref 0–1)
BILIRUB SERPL-MCNC: 1.1 MG/DL (ref ?–1.2)
BILIRUB UR QL STRIP: 1
BUN SERPL-MCNC: 10 MG/DL (ref 7–18)
BUN/CREAT SERPL: 9 (ref 6–20)
CALCIUM SERPL-MCNC: 9.2 MG/DL (ref 8.5–10.1)
CHLORIDE SERPL-SCNC: 103 MMOL/L (ref 98–107)
CLARITY UR: ABNORMAL
CO2 SERPL-SCNC: 21 MMOL/L (ref 21–32)
COLOR UR: ABNORMAL
CREAT SERPL-MCNC: 1.13 MG/DL (ref 0.55–1.02)
DIFFERENTIAL METHOD BLD: ABNORMAL
EGFR (NO RACE VARIABLE) (RUSH/TITUS): 61 ML/MIN/1.73M2
EOSINOPHIL # BLD AUTO: 0.07 K/UL (ref 0–0.5)
EOSINOPHIL NFR BLD AUTO: 0.4 % (ref 1–4)
ERYTHROCYTE [DISTWIDTH] IN BLOOD BY AUTOMATED COUNT: 14.2 % (ref 11.5–14.5)
GLOBULIN SER-MCNC: 4.4 G/DL (ref 2–4)
GLUCOSE SERPL-MCNC: 161 MG/DL (ref 70–105)
GLUCOSE SERPL-MCNC: 161 MG/DL (ref 74–106)
GLUCOSE UR STRIP-MCNC: 50 MG/DL
HCT VFR BLD AUTO: 38.4 % (ref 38–47)
HGB BLD-MCNC: 11.8 G/DL (ref 12–16)
IMM GRANULOCYTES # BLD AUTO: 0.17 K/UL (ref 0–0.04)
IMM GRANULOCYTES NFR BLD: 0.9 % (ref 0–0.4)
KETONES UR STRIP-SCNC: ABNORMAL MG/DL
LACTATE SERPL-SCNC: 1.8 MMOL/L (ref 0.4–2)
LEUKOCYTE ESTERASE UR QL STRIP: ABNORMAL
LIPASE SERPL-CCNC: 148 U/L (ref 16–77)
LYMPHOCYTES # BLD AUTO: 0.41 K/UL (ref 1–4.8)
LYMPHOCYTES NFR BLD AUTO: 2.2 % (ref 27–41)
MAGNESIUM SERPL-MCNC: 1.6 MG/DL (ref 1.7–2.3)
MCH RBC QN AUTO: 24.9 PG (ref 27–31)
MCHC RBC AUTO-ENTMCNC: 30.7 G/DL (ref 32–36)
MCV RBC AUTO: 81 FL (ref 80–96)
MONOCYTES # BLD AUTO: 0.63 K/UL (ref 0–0.8)
MONOCYTES NFR BLD AUTO: 3.4 % (ref 2–6)
MPC BLD CALC-MCNC: 11.7 FL (ref 9.4–12.4)
MUCOUS, UA: ABNORMAL /LPF
NEUTROPHILS # BLD AUTO: 17.37 K/UL (ref 1.8–7.7)
NEUTROPHILS NFR BLD AUTO: 92.6 % (ref 53–65)
NITRITE UR QL STRIP: NEGATIVE
NRBC # BLD AUTO: 0 X10E3/UL
NRBC, AUTO (.00): 0 %
PH UR STRIP: 5.5 PH UNITS
PLATELET # BLD AUTO: 332 K/UL (ref 150–400)
POTASSIUM SERPL-SCNC: 2.7 MMOL/L (ref 3.5–5.1)
PROT SERPL-MCNC: 7.2 G/DL (ref 6.4–8.2)
PROT UR QL STRIP: 70
RBC # BLD AUTO: 4.74 M/UL (ref 4.2–5.4)
RBC # UR STRIP: ABNORMAL /UL
RBC #/AREA URNS HPF: 10 /HPF
SODIUM SERPL-SCNC: 135 MMOL/L (ref 136–145)
SP GR UR STRIP: 1.02
SQUAMOUS #/AREA URNS LPF: ABNORMAL /HPF
UROBILINOGEN UR STRIP-ACNC: 4 MG/DL
WBC # BLD AUTO: 18.74 K/UL (ref 4.5–11)
WBC #/AREA URNS HPF: 47 /HPF
WBC CLUMPS, UA: ABNORMAL /HPF

## 2024-11-04 PROCEDURE — 93005 ELECTROCARDIOGRAM TRACING: CPT

## 2024-11-04 PROCEDURE — 25000003 PHARM REV CODE 250: Performed by: NURSE PRACTITIONER

## 2024-11-04 PROCEDURE — 36415 COLL VENOUS BLD VENIPUNCTURE: CPT | Performed by: NURSE PRACTITIONER

## 2024-11-04 PROCEDURE — 87086 URINE CULTURE/COLONY COUNT: CPT | Performed by: EMERGENCY MEDICINE

## 2024-11-04 PROCEDURE — 25000003 PHARM REV CODE 250: Performed by: EMERGENCY MEDICINE

## 2024-11-04 PROCEDURE — 63600175 PHARM REV CODE 636 W HCPCS: Performed by: NURSE PRACTITIONER

## 2024-11-04 PROCEDURE — 36415 COLL VENOUS BLD VENIPUNCTURE: CPT | Performed by: EMERGENCY MEDICINE

## 2024-11-04 PROCEDURE — 85025 COMPLETE CBC W/AUTO DIFF WBC: CPT | Performed by: EMERGENCY MEDICINE

## 2024-11-04 PROCEDURE — 81003 URINALYSIS AUTO W/O SCOPE: CPT | Performed by: EMERGENCY MEDICINE

## 2024-11-04 PROCEDURE — 96367 TX/PROPH/DG ADDL SEQ IV INF: CPT

## 2024-11-04 PROCEDURE — 25500020 PHARM REV CODE 255: Performed by: NURSE PRACTITIONER

## 2024-11-04 PROCEDURE — 96365 THER/PROPH/DIAG IV INF INIT: CPT

## 2024-11-04 PROCEDURE — 99285 EMERGENCY DEPT VISIT HI MDM: CPT | Mod: 25

## 2024-11-04 PROCEDURE — 83690 ASSAY OF LIPASE: CPT | Performed by: EMERGENCY MEDICINE

## 2024-11-04 PROCEDURE — 80053 COMPREHEN METABOLIC PANEL: CPT | Performed by: EMERGENCY MEDICINE

## 2024-11-04 PROCEDURE — 63600175 PHARM REV CODE 636 W HCPCS: Performed by: EMERGENCY MEDICINE

## 2024-11-04 PROCEDURE — 83735 ASSAY OF MAGNESIUM: CPT | Performed by: EMERGENCY MEDICINE

## 2024-11-04 PROCEDURE — 82962 GLUCOSE BLOOD TEST: CPT

## 2024-11-04 PROCEDURE — 83605 ASSAY OF LACTIC ACID: CPT | Performed by: NURSE PRACTITIONER

## 2024-11-04 PROCEDURE — 96375 TX/PRO/DX INJ NEW DRUG ADDON: CPT

## 2024-11-04 PROCEDURE — 96361 HYDRATE IV INFUSION ADD-ON: CPT

## 2024-11-04 RX ORDER — CEPHALEXIN 500 MG/1
500 CAPSULE ORAL EVERY 12 HOURS
Qty: 14 CAPSULE | Refills: 0 | Status: ON HOLD | OUTPATIENT
Start: 2024-11-04 | End: 2024-11-08 | Stop reason: HOSPADM

## 2024-11-04 RX ORDER — MAGNESIUM SULFATE HEPTAHYDRATE 40 MG/ML
2 INJECTION, SOLUTION INTRAVENOUS
Status: COMPLETED | OUTPATIENT
Start: 2024-11-04 | End: 2024-11-04

## 2024-11-04 RX ORDER — ONDANSETRON HYDROCHLORIDE 2 MG/ML
4 INJECTION, SOLUTION INTRAVENOUS
Status: COMPLETED | OUTPATIENT
Start: 2024-11-04 | End: 2024-11-04

## 2024-11-04 RX ORDER — POTASSIUM CHLORIDE 7.45 MG/ML
10 INJECTION INTRAVENOUS
Status: COMPLETED | OUTPATIENT
Start: 2024-11-04 | End: 2024-11-04

## 2024-11-04 RX ORDER — IOPAMIDOL 755 MG/ML
100 INJECTION, SOLUTION INTRAVASCULAR
Status: COMPLETED | OUTPATIENT
Start: 2024-11-04 | End: 2024-11-04

## 2024-11-04 RX ORDER — POTASSIUM CHLORIDE 20 MEQ/1
20 TABLET, EXTENDED RELEASE ORAL 2 TIMES DAILY
Qty: 6 TABLET | Refills: 0 | Status: ON HOLD | OUTPATIENT
Start: 2024-11-04 | End: 2024-11-08 | Stop reason: HOSPADM

## 2024-11-04 RX ORDER — FLUCONAZOLE 150 MG/1
150 TABLET ORAL DAILY
Qty: 1 TABLET | Refills: 0 | Status: SHIPPED | OUTPATIENT
Start: 2024-11-04 | End: 2024-11-05 | Stop reason: SDUPTHER

## 2024-11-04 RX ORDER — CALCIUM CARBONATE 300MG(750)
1 TABLET,CHEWABLE ORAL 2 TIMES DAILY
Qty: 14 TABLET | Refills: 0 | Status: ON HOLD | OUTPATIENT
Start: 2024-11-04 | End: 2024-11-08 | Stop reason: HOSPADM

## 2024-11-04 RX ADMIN — MAGNESIUM SULFATE HEPTAHYDRATE 2 G: 40 INJECTION, SOLUTION INTRAVENOUS at 01:11

## 2024-11-04 RX ADMIN — SODIUM CHLORIDE 1000 ML: 9 INJECTION, SOLUTION INTRAVENOUS at 08:11

## 2024-11-04 RX ADMIN — SODIUM CHLORIDE 1000 ML: 9 INJECTION, SOLUTION INTRAVENOUS at 10:11

## 2024-11-04 RX ADMIN — IOPAMIDOL 100 ML: 755 INJECTION, SOLUTION INTRAVENOUS at 10:11

## 2024-11-04 RX ADMIN — ONDANSETRON 4 MG: 2 INJECTION INTRAMUSCULAR; INTRAVENOUS at 08:11

## 2024-11-04 RX ADMIN — PIPERACILLIN SODIUM AND TAZOBACTAM SODIUM 4.5 G: 4; .5 INJECTION, POWDER, LYOPHILIZED, FOR SOLUTION INTRAVENOUS at 10:11

## 2024-11-04 RX ADMIN — POTASSIUM CHLORIDE 10 MEQ: 7.46 INJECTION, SOLUTION INTRAVENOUS at 11:11

## 2024-11-04 NOTE — DISCHARGE INSTRUCTIONS
Drink plenty of fluids.  Take magnesium and potassium as directed.  Follow-up with gastroenterology.Follow up with your primary care provider in 2 days. Return to the emergency department for any increase in symptoms or for any other new or worrisome symptoms.

## 2024-11-04 NOTE — TELEPHONE ENCOUNTER
"Spoke with patient regarding [employer] message that stated "Please help me something is wrong with me. Please help me". Patient stated that she was currently at the ER  and that she was having right flank pain. She also stated that she had an ear infection and was started on Amoxicillin on 10/31. She wasn't sure if that what was causing her pain, but she had stopped taking it. I informed her to keep us updated and to let us know if she needs anything else. Patient verbalized good understanding.   "

## 2024-11-04 NOTE — ED NOTES
Return from xray at this time;  pt remains a/a/o x 4  resp even and nonlabored;  she denies any pain at present and states nausea is better now;  Vs remain within her baseline;  Current POC ongoing

## 2024-11-04 NOTE — TELEPHONE ENCOUNTER
----- Message from ANGELINA Morales sent at 11/3/2024  7:20 AM CST -----  Liver enzymes are ok. WBCs are mildly elevated, I see she is on antibiotics from ENT? Continue with Imuran as prescribed.

## 2024-11-04 NOTE — ED PROVIDER NOTES
Encounter Date: 11/4/2024       History     Chief Complaint   Patient presents with    Abdominal Pain     Started 2-3 days ago. Was put on amoxicillin Thursday    Vomiting     This AM    Nausea     44-year-old female presents to the emergency department to be evaluated for pain in her right upper abdomen.  This has been a intermittent problem for her over the last 8 months.  She was recently diagnosed with Crohn's.  Her pain has been much worse over the last couple of days and she vomited once last night.  She also had diarrhea yesterday around 8 times.    The history is provided by the patient.   Abdominal Pain  The other symptoms of the illness include nausea, vomiting and diarrhea. The other symptoms of the illness do not include fever, fatigue, jaundice, melena, dysuria, hematemesis, hematochezia, vaginal discharge or vaginal bleeding.     Review of patient's allergies indicates:  No Known Allergies  Past Medical History:   Diagnosis Date    Asthma     COVID     Hypertension     Kidney stone      Past Surgical History:   Procedure Laterality Date    ADENOIDECTOMY      BREAST SURGERY Bilateral     reduction    CHOLECYSTECTOMY  11/12/2020    CYSTOURETEROSCOPY, WITH HOLMIUM LASER LITHOTRIPSY OF URETERAL CALCULUS AND STENT INSERTION Right 5/16/2024    Procedure: CYSTOURETEROSCOPY WITH HOLMIUM LASER LITHOTRIPSY OF URETERAL CALCULUS, STENT INSERTION AND INDICATED PROCEDURES;  Surgeon: Luc Howard Jr., MD;  Location: [facility];  Service: Urology;  Laterality: Right;    HYSTERECTOMY  2016    Full    REDUCTION OF BOTH BREASTS  01/09/2020    TONSILLECTOMY      TOTAL REDUCTION MAMMOPLASTY      TUBAL LIGATION       Family History   Problem Relation Name Age of Onset    Diabetes Mother      Hypertension Mother      Diabetes Maternal Grandmother      Hypertension Maternal Grandmother      Asthma Maternal Grandmother      Asthma Sister      Hypertension Sister       Social History     Tobacco Use    Smoking status:  Never    Smokeless tobacco: Never   Substance Use Topics    Alcohol use: Never    Drug use: Never     Review of Systems   Constitutional:  Negative for fatigue and fever.   Gastrointestinal:  Positive for diarrhea, nausea and vomiting. Negative for hematemesis, hematochezia, jaundice and melena.   Genitourinary:  Negative for dysuria, vaginal bleeding and vaginal discharge.   All other systems reviewed and are negative.      Physical Exam     Initial Vitals   BP Pulse Resp Temp SpO2   11/04/24 0738 11/04/24 0727 11/04/24 0727 11/04/24 0727 11/04/24 0727   110/64 (!) 127 17 99.8 °F (37.7 °C) 96 %      MAP       --                Physical Exam    Vitals reviewed.  Constitutional: She appears well-developed and well-nourished.   Neck: Neck supple.   Cardiovascular:  Normal rate and regular rhythm.           Pulmonary/Chest: Breath sounds normal.   Abdominal: Abdomen is soft. Bowel sounds are normal. She exhibits no distension. There is no abdominal tenderness.   Musculoskeletal:         General: Normal range of motion.      Cervical back: Neck supple.     Neurological: She is alert and oriented to person, place, and time. She has normal strength. GCS score is 15. GCS eye subscore is 4. GCS verbal subscore is 5. GCS motor subscore is 6.   Skin: Skin is warm and dry. Capillary refill takes less than 2 seconds.   Psychiatric: She has a normal mood and affect.         Medical Screening Exam   See Full Note    ED Course   Procedures  Labs Reviewed   COMPREHENSIVE METABOLIC PANEL - Abnormal       Result Value    Sodium 135 (*)     Potassium 2.7 (*)     Chloride 103      CO2 21      Anion Gap 14      Glucose 161 (*)     BUN 10      Creatinine 1.13 (*)     BUN/Creatinine Ratio 9      Calcium 9.2      Total Protein 7.2      Albumin 2.8 (*)     Globulin 4.4 (*)     A/G Ratio 0.6      Bilirubin, Total 1.1      Alk Phos 149 (*)     ALT 63 (*)     AST 72 (*)     eGFR 61     URINALYSIS, REFLEX TO URINE CULTURE - Abnormal    Color,  UA Dark-Yellow      Clarity, UA Turbid      pH, UA 5.5      Leukocytes, UA Large (*)     Nitrites, UA Negative      Protein, UA 70 (*)     Glucose, UA 50 (*)     Ketones, UA Trace      Urobilinogen, UA 4 (*)     Bilirubin, UA 1 (*)     Blood, UA Small (*)     Specific Gravity, UA 1.020     MAGNESIUM - Abnormal    Magnesium 1.6 (*)    LIPASE - Abnormal    Lipase 148 (*)    CBC WITH DIFFERENTIAL - Abnormal    WBC 18.74 (*)     RBC 4.74      Hemoglobin 11.8 (*)     Hematocrit 38.4      MCV 81.0      MCH 24.9 (*)     MCHC 30.7 (*)     RDW 14.2      Platelet Count 332      MPV 11.7      Neutrophils % 92.6 (*)     Lymphocytes % 2.2 (*)     Monocytes % 3.4      Eosinophils % 0.4 (*)     Basophils % 0.5      Immature Granulocytes % 0.9 (*)     nRBC, Auto 0.0      Neutrophils, Abs 17.37 (*)     Lymphocytes, Absolute 0.41 (*)     Monocytes, Absolute 0.63      Eosinophils, Absolute 0.07      Basophils, Absolute 0.09      Immature Granulocytes, Absolute 0.17 (*)     nRBC, Absolute 0.00      Diff Type Auto     URINALYSIS, MICROSCOPIC - Abnormal    WBC, UA 47 (*)     RBC, UA 10 (*)     Bacteria, UA Moderate (*)     Squamous Epithelial Cells, UA Occasional (*)     WBC Clumps Occasional (*)     Mucous Many (*)    POCT GLUCOSE MONITORING CONTINUOUS - Abnormal    POC Glucose 161 (*)    LACTIC ACID, PLASMA - Normal    Lactic Acid 1.8     CULTURE, URINE   CULTURE, BLOOD   CULTURE, BLOOD   CULTURE, STOOL   C DIFF TOXIN BY PCR   CBC W/ AUTO DIFFERENTIAL    Narrative:     The following orders were created for panel order CBC auto differential.  Procedure                               Abnormality         Status                     ---------                               -----------         ------                     CBC with Differential[[id number]]       Abnormal            Final result                 Please view results for these tests on the individual orders.   CALPROTECTIN, STOOL          Imaging Results              CT Abdomen  Pelvis With IV Contrast NO Oral Contrast (Final result)  Result time 11/04/24 12:00:44      Final result by Sage Ellington MD (11/04/24 12:00:44)                   Impression:      1. Nonspecific intraluminal fluid within loops of normal caliber colon, as may be seen the setting of diarrheal illness.  2. Additional details of chronic and incidental findings, as provided in the body of the report.      Electronically signed by: Sage Ellington  Date:    11/04/2024  Time:    12:00               Narrative:    EXAMINATION:  CT ABDOMEN PELVIS WITH IV CONTRAST    CLINICAL HISTORY:  Abdominal abscess/infection suspected;hx of Crohn's dz, pain in right upper abdomen, elevated wbc;    TECHNIQUE:  Low dose axial images, sagittal and coronal reformations were obtained from the lung bases to the pubic symphysis following the IV administration of 100 mL of Isovue 370.    COMPARISON:  CT of the abdomen and pelvis performed 05/15/2024.    FINDINGS:  Lower chest: Several small, sub 6 mm subpleural nodules are noted in the left lower lobe (axial series 3, image 8, image 12, image 19).    Liver: Unremarkable.    Gallbladder and bile ducts: Status post cholecystectomy.  Minimal prominence of the bile ducts may reflect post cholecystectomy status.    Pancreas: Unremarkable.    Spleen: Unremarkable.    Adrenals: Unremarkable.    Kidneys: Fluid attenuation presumed simple cyst lower pole left kidney.    Lymph nodes: No abdominal or pelvic lymphadenopathy.    Bowel and mesentery: No evidence of bowel obstruction.  Normal appendix.  Areas of intraluminal fluid within normal caliber loops of colon, as may be seen in setting of diarrheal illness.    Abdominal aorta: Unremarkable.    Inferior vena cava: Unremarkable.    Free fluid or free air: None.    Pelvis: Unremarkable.    Body wall: Remote operative sequela suggested in the anterior abdominal wall.    Bones: No acute change.  Degenerative findings of the spine, suggested at least  moderate canal stenosis at L1-2 through L3-4.                                       XR ABDOMEN, ACUTE 2 OR MORE VIEWS WITH CHEST (Final result)  Result time 11/04/24 09:41:00      Final result by Sage Ellington MD (11/04/24 09:41:00)                   Impression:      1. No convincing evidence of acute pulmonary disease.  2. Nonspecific, nonobstructive bowel gas pattern.  3. Moderate to large volume colonic stool.      Electronically signed by: Sage Ellington  Date:    11/04/2024  Time:    09:41               Narrative:    EXAMINATION:  XR ABDOMEN ACUTE 2 OR MORE VIEWS WITH CHEST    CLINICAL HISTORY:  abdominal pain;    TECHNIQUE:  PA chest and four views of the abdomen.    COMPARISON:  Abdominal radiograph series performed 06/20/2024, 08:39 hours.    FINDINGS:  Cardiomediastinal contours appear to be within normal limits.  Lungs appear essentially clear.    No definite pneumothorax or large volume pleural effusion.    Osseous and soft tissue structures appear without definite acute change.    Query prior cholecystectomy.    No definite dilated gas-filled loops of small or large bowel are appreciated.  No definite free air or fluid levels on upright view.  Moderate to large volume colonic stool.                                       Medications   magnesium sulfate 2g in water 50mL IVPB (premix) (has no administration in time range)   ondansetron injection 4 mg (4 mg Intravenous Given 11/4/24 0820)   sodium chloride 0.9% bolus 1,000 mL 1,000 mL (0 mLs Intravenous Stopped 11/4/24 0919)   piperacillin-tazobactam (ZOSYN) 4.5 g in D5W 100 mL IVPB (MB+) (0 g Intravenous Stopped 11/4/24 1048)   potassium chloride 10 mEq in 100 mL IVPB (10 mEq Intravenous New Bag 11/4/24 1137)   sodium chloride 0.9% bolus 1,000 mL 1,000 mL (1,000 mLs Intravenous New Bag 11/4/24 1017)   iopamidoL (ISOVUE-370) injection 100 mL (100 mLs Intravenous Given 11/4/24 1030)     Medical Decision Making  44-year-old female presents to the  emergency department to be evaluated for pain in her right upper abdomen.  This has been a intermittent problem for her over the last 8 months.  She was recently diagnosed with Crohn's.  Her pain has been much worse over the last couple of days and she vomited once last night.  She also had diarrhea yesterday around 8 times.  X-ray ordered, films reviewed as well as radiologist's interpretation significant for no acute process  Labs ordered and reviewed  EKG ordered, reviewed by Dr. Leonard, significant for rate of 121, sinus tachycardia, no STEMI  CT ordered, reviewed as well as the radiologist's interpretation significant for no acute process  Diagnosis: Abdominal pain, diarrhea, urinary tract infection  Treated with IV fluids, Zosyn, Zofran potassium and magnesium  Discussed with gastroenterology, ann Winslow agree with plan of care  Stool studies pending  Prescribed magnesium, potassium, Keflex        Amount and/or Complexity of Data Reviewed  Labs: ordered.  Radiology: ordered.    Risk  OTC drugs.  Prescription drug management.                                      Clinical Impression:   Final diagnoses:  [R00.0] Tachycardia  [N39.0] Urinary tract infection without hematuria, site unspecified (Primary)  [R10.9] Abdominal pain, unspecified abdominal location  [R19.7] Diarrhea, unspecified type        ED Disposition Condition    Discharge Stable          ED Prescriptions       Medication Sig Dispense Start Date End Date Auth. Provider    potassium chloride SA (K-DUR,KLOR-CON) 20 MEQ tablet Take 1 tablet (20 mEq total) by mouth 2 (two) times daily. for 3 days 6 tablet 11/4/2024 11/7/2024 Tamiko Hwang FNP    magnesium oxide 400 mg magnesium Tab Take 1 tablet by mouth 2 (two) times daily. 14 tablet 11/4/2024 -- Tamiko Hwang FNP    cephALEXin (KEFLEX) 500 MG capsule Take 1 capsule (500 mg total) by mouth every 12 (twelve) hours. for 7 days 14 capsule 11/4/2024 11/11/2024 Tamiko Hwang FNP           Follow-up Information    None          Tamiko Hwang, ANGELINA  11/04/24 1224       Tamiko Hwang, ANGELINA  11/04/24 1224

## 2024-11-05 ENCOUNTER — OFFICE VISIT (OUTPATIENT)
Dept: FAMILY MEDICINE | Facility: CLINIC | Age: 44
End: 2024-11-05
Payer: COMMERCIAL

## 2024-11-05 ENCOUNTER — HOSPITAL ENCOUNTER (INPATIENT)
Facility: HOSPITAL | Age: 44
LOS: 3 days | Discharge: HOME | DRG: 872 | End: 2024-11-08
Attending: INTERNAL MEDICINE | Admitting: INTERNAL MEDICINE
Payer: COMMERCIAL

## 2024-11-05 VITALS
WEIGHT: 260 LBS | DIASTOLIC BLOOD PRESSURE: 60 MMHG | HEART RATE: 122 BPM | SYSTOLIC BLOOD PRESSURE: 93 MMHG | HEIGHT: 61 IN | BODY MASS INDEX: 49.09 KG/M2 | OXYGEN SATURATION: 95 % | RESPIRATION RATE: 18 BRPM | TEMPERATURE: 100 F

## 2024-11-05 DIAGNOSIS — Z87.19 HISTORY OF CROHN'S DISEASE: ICD-10-CM

## 2024-11-05 DIAGNOSIS — E87.6 HYPOKALEMIA: ICD-10-CM

## 2024-11-05 DIAGNOSIS — I10 PRIMARY HYPERTENSION: ICD-10-CM

## 2024-11-05 DIAGNOSIS — Z86.39 HISTORY OF HYPOKALEMIA: ICD-10-CM

## 2024-11-05 DIAGNOSIS — R00.0 TACHYCARDIA, UNSPECIFIED: ICD-10-CM

## 2024-11-05 DIAGNOSIS — K50.018 CROHN'S DISEASE OF SMALL INTESTINE WITH OTHER COMPLICATION: ICD-10-CM

## 2024-11-05 DIAGNOSIS — Z76.0 MEDICATION REFILL: ICD-10-CM

## 2024-11-05 DIAGNOSIS — R11.2 NAUSEA VOMITING AND DIARRHEA: ICD-10-CM

## 2024-11-05 DIAGNOSIS — R10.84 GENERALIZED ABDOMINAL PAIN: Primary | ICD-10-CM

## 2024-11-05 DIAGNOSIS — N39.0 URINARY TRACT INFECTION WITHOUT HEMATURIA, SITE UNSPECIFIED: Primary | ICD-10-CM

## 2024-11-05 DIAGNOSIS — R19.7 NAUSEA VOMITING AND DIARRHEA: ICD-10-CM

## 2024-11-05 DIAGNOSIS — R10.31 RIGHT LOWER QUADRANT ABDOMINAL PAIN: ICD-10-CM

## 2024-11-05 DIAGNOSIS — N30.00 ACUTE CYSTITIS WITHOUT HEMATURIA: ICD-10-CM

## 2024-11-05 DIAGNOSIS — A41.9 SEPSIS, DUE TO UNSPECIFIED ORGANISM, UNSPECIFIED WHETHER ACUTE ORGAN DYSFUNCTION PRESENT: ICD-10-CM

## 2024-11-05 PROBLEM — E66.01 CLASS 3 SEVERE OBESITY WITH BODY MASS INDEX (BMI) OF 45.0 TO 49.9 IN ADULT: Status: ACTIVE | Noted: 2024-08-23

## 2024-11-05 PROBLEM — R65.20 SEVERE SEPSIS: Status: ACTIVE | Noted: 2024-11-05

## 2024-11-05 PROBLEM — E66.813 CLASS 3 SEVERE OBESITY WITH BODY MASS INDEX (BMI) OF 45.0 TO 49.9 IN ADULT: Status: ACTIVE | Noted: 2024-08-23

## 2024-11-05 LAB
ALBUMIN SERPL BCP-MCNC: 2.7 G/DL (ref 3.5–5)
ALBUMIN/GLOB SERPL: 0.6 {RATIO}
ALP SERPL-CCNC: 158 U/L (ref 39–100)
ALT SERPL W P-5'-P-CCNC: 52 U/L (ref 13–56)
ANION GAP SERPL CALCULATED.3IONS-SCNC: 13 MMOL/L (ref 7–16)
AST SERPL W P-5'-P-CCNC: 40 U/L (ref 15–37)
BASOPHILS # BLD AUTO: 0.06 K/UL (ref 0–0.2)
BASOPHILS NFR BLD AUTO: 0.4 % (ref 0–1)
BILIRUB SERPL-MCNC: 1.2 MG/DL (ref ?–1.2)
BUN SERPL-MCNC: 10 MG/DL (ref 7–18)
BUN/CREAT SERPL: 12 (ref 6–20)
CALCIUM SERPL-MCNC: 8.5 MG/DL (ref 8.5–10.1)
CHLORIDE SERPL-SCNC: 102 MMOL/L (ref 98–107)
CO2 SERPL-SCNC: 23 MMOL/L (ref 21–32)
CREAT SERPL-MCNC: 0.85 MG/DL (ref 0.55–1.02)
DIFFERENTIAL METHOD BLD: ABNORMAL
EGFR (NO RACE VARIABLE) (RUSH/TITUS): 86 ML/MIN/1.73M2
EOSINOPHIL # BLD AUTO: 0.21 K/UL (ref 0–0.5)
EOSINOPHIL NFR BLD AUTO: 1.4 % (ref 1–4)
ERYTHROCYTE [DISTWIDTH] IN BLOOD BY AUTOMATED COUNT: 14.5 % (ref 11.5–14.5)
GLOBULIN SER-MCNC: 4.4 G/DL (ref 2–4)
GLUCOSE SERPL-MCNC: 98 MG/DL (ref 74–106)
HCT VFR BLD AUTO: 34.8 % (ref 38–47)
HGB BLD-MCNC: 10.6 G/DL (ref 12–16)
IMM GRANULOCYTES # BLD AUTO: 0.1 K/UL (ref 0–0.04)
IMM GRANULOCYTES NFR BLD: 0.7 % (ref 0–0.4)
INFLUENZA A MOLECULAR (OHS): NEGATIVE
INFLUENZA B MOLECULAR (OHS): NEGATIVE
LACTATE SERPL-SCNC: 1.4 MMOL/L (ref 0.4–2)
LACTATE SERPL-SCNC: 2.2 MMOL/L (ref 0.4–2)
LYMPHOCYTES # BLD AUTO: 0.23 K/UL (ref 1–4.8)
LYMPHOCYTES NFR BLD AUTO: 1.6 % (ref 27–41)
MAGNESIUM SERPL-MCNC: 2.1 MG/DL (ref 1.7–2.3)
MCH RBC QN AUTO: 24.8 PG (ref 27–31)
MCHC RBC AUTO-ENTMCNC: 30.5 G/DL (ref 32–36)
MCV RBC AUTO: 81.5 FL (ref 80–96)
MONOCYTES # BLD AUTO: 0.24 K/UL (ref 0–0.8)
MONOCYTES NFR BLD AUTO: 1.6 % (ref 2–6)
MPC BLD CALC-MCNC: 11.6 FL (ref 9.4–12.4)
NEUTROPHILS # BLD AUTO: 13.75 K/UL (ref 1.8–7.7)
NEUTROPHILS NFR BLD AUTO: 94.3 % (ref 53–65)
NRBC # BLD AUTO: 0 X10E3/UL
NRBC, AUTO (.00): 0 %
OHS QRS DURATION: 80 MS
OHS QTC CALCULATION: 411 MS
PLATELET # BLD AUTO: 325 K/UL (ref 150–400)
POTASSIUM SERPL-SCNC: 2.6 MMOL/L (ref 3.5–5.1)
PROT SERPL-MCNC: 7.1 G/DL (ref 6.4–8.2)
RBC # BLD AUTO: 4.27 M/UL (ref 4.2–5.4)
SARS-COV-2 RDRP RESP QL NAA+PROBE: NEGATIVE
SODIUM SERPL-SCNC: 135 MMOL/L (ref 136–145)
WBC # BLD AUTO: 14.59 K/UL (ref 4.5–11)

## 2024-11-05 PROCEDURE — 11000001 HC ACUTE MED/SURG PRIVATE ROOM

## 2024-11-05 PROCEDURE — 96361 HYDRATE IV INFUSION ADD-ON: CPT

## 2024-11-05 PROCEDURE — 63600175 PHARM REV CODE 636 W HCPCS: Performed by: NURSE PRACTITIONER

## 2024-11-05 PROCEDURE — 85025 COMPLETE CBC W/AUTO DIFF WBC: CPT | Performed by: NURSE PRACTITIONER

## 2024-11-05 PROCEDURE — 25000003 PHARM REV CODE 250: Performed by: NURSE PRACTITIONER

## 2024-11-05 PROCEDURE — 83735 ASSAY OF MAGNESIUM: CPT | Performed by: NURSE PRACTITIONER

## 2024-11-05 PROCEDURE — 63600175 PHARM REV CODE 636 W HCPCS

## 2024-11-05 PROCEDURE — 99285 EMERGENCY DEPT VISIT HI MDM: CPT | Mod: 25

## 2024-11-05 PROCEDURE — 94761 N-INVAS EAR/PLS OXIMETRY MLT: CPT

## 2024-11-05 PROCEDURE — 83605 ASSAY OF LACTIC ACID: CPT | Performed by: NURSE PRACTITIONER

## 2024-11-05 PROCEDURE — 87502 INFLUENZA DNA AMP PROBE: CPT | Performed by: NURSE PRACTITIONER

## 2024-11-05 PROCEDURE — 99900035 HC TECH TIME PER 15 MIN (STAT)

## 2024-11-05 PROCEDURE — 63600175 PHARM REV CODE 636 W HCPCS: Performed by: EMERGENCY MEDICINE

## 2024-11-05 PROCEDURE — 80053 COMPREHEN METABOLIC PANEL: CPT | Performed by: NURSE PRACTITIONER

## 2024-11-05 PROCEDURE — 96375 TX/PRO/DX INJ NEW DRUG ADDON: CPT

## 2024-11-05 PROCEDURE — 96365 THER/PROPH/DIAG IV INF INIT: CPT

## 2024-11-05 PROCEDURE — 87635 SARS-COV-2 COVID-19 AMP PRB: CPT | Performed by: NURSE PRACTITIONER

## 2024-11-05 PROCEDURE — 94799 UNLISTED PULMONARY SVC/PX: CPT

## 2024-11-05 RX ORDER — AZATHIOPRINE 100 MG/1
100 TABLET ORAL DAILY
Status: DISCONTINUED | OUTPATIENT
Start: 2024-11-06 | End: 2024-11-08 | Stop reason: HOSPADM

## 2024-11-05 RX ORDER — LOSARTAN POTASSIUM 50 MG/1
50 TABLET ORAL DAILY
Qty: 90 TABLET | Refills: 3 | Status: ON HOLD | OUTPATIENT
Start: 2024-11-05 | End: 2024-11-08 | Stop reason: HOSPADM

## 2024-11-05 RX ORDER — IBUPROFEN 200 MG
24 TABLET ORAL
Status: DISCONTINUED | OUTPATIENT
Start: 2024-11-05 | End: 2024-11-08 | Stop reason: HOSPADM

## 2024-11-05 RX ORDER — BUDESONIDE 0.25 MG/2ML
0.5 INHALANT ORAL 2 TIMES DAILY PRN
Status: DISCONTINUED | OUTPATIENT
Start: 2024-11-05 | End: 2024-11-08 | Stop reason: HOSPADM

## 2024-11-05 RX ORDER — MORPHINE SULFATE 4 MG/ML
4 INJECTION, SOLUTION INTRAMUSCULAR; INTRAVENOUS
Status: COMPLETED | OUTPATIENT
Start: 2024-11-05 | End: 2024-11-05

## 2024-11-05 RX ORDER — FLUCONAZOLE 2 MG/ML
200 INJECTION, SOLUTION INTRAVENOUS
Status: COMPLETED | OUTPATIENT
Start: 2024-11-05 | End: 2024-11-05

## 2024-11-05 RX ORDER — KETOROLAC TROMETHAMINE 30 MG/ML
60 INJECTION, SOLUTION INTRAMUSCULAR; INTRAVENOUS
Status: DISCONTINUED | OUTPATIENT
Start: 2024-11-05 | End: 2024-11-05 | Stop reason: HOSPADM

## 2024-11-05 RX ORDER — GLUCAGON 1 MG
1 KIT INJECTION
Status: DISCONTINUED | OUTPATIENT
Start: 2024-11-05 | End: 2024-11-08 | Stop reason: HOSPADM

## 2024-11-05 RX ORDER — POLYETHYLENE GLYCOL 3350 17 G/17G
17 POWDER, FOR SOLUTION ORAL 2 TIMES DAILY PRN
Status: DISCONTINUED | OUTPATIENT
Start: 2024-11-05 | End: 2024-11-08 | Stop reason: HOSPADM

## 2024-11-05 RX ORDER — IBUPROFEN 800 MG/1
800 TABLET ORAL
Status: COMPLETED | OUTPATIENT
Start: 2024-11-05 | End: 2024-11-05

## 2024-11-05 RX ORDER — ONDANSETRON HYDROCHLORIDE 2 MG/ML
4 INJECTION, SOLUTION INTRAVENOUS
Status: COMPLETED | OUTPATIENT
Start: 2024-11-05 | End: 2024-11-05

## 2024-11-05 RX ORDER — CEFTRIAXONE 1 G/1
1 INJECTION, POWDER, FOR SOLUTION INTRAMUSCULAR; INTRAVENOUS
Status: DISCONTINUED | OUTPATIENT
Start: 2024-11-06 | End: 2024-11-08 | Stop reason: HOSPADM

## 2024-11-05 RX ORDER — IBUPROFEN 200 MG
16 TABLET ORAL
Status: DISCONTINUED | OUTPATIENT
Start: 2024-11-05 | End: 2024-11-08 | Stop reason: HOSPADM

## 2024-11-05 RX ORDER — POTASSIUM CHLORIDE 20 MEQ/1
40 TABLET, EXTENDED RELEASE ORAL ONCE
Status: COMPLETED | OUTPATIENT
Start: 2024-11-05 | End: 2024-11-05

## 2024-11-05 RX ORDER — FLUCONAZOLE 150 MG/1
150 TABLET ORAL DAILY
Qty: 1 TABLET | Refills: 0 | Status: ON HOLD | OUTPATIENT
Start: 2024-11-05 | End: 2024-11-08

## 2024-11-05 RX ORDER — DOCUSATE SODIUM 100 MG
600 CAPSULE ORAL
Status: DISCONTINUED | OUTPATIENT
Start: 2024-11-05 | End: 2024-11-08 | Stop reason: HOSPADM

## 2024-11-05 RX ORDER — ACETAMINOPHEN 325 MG/1
650 TABLET ORAL EVERY 4 HOURS PRN
Status: DISCONTINUED | OUTPATIENT
Start: 2024-11-05 | End: 2024-11-08 | Stop reason: HOSPADM

## 2024-11-05 RX ORDER — POTASSIUM CHLORIDE 20 MEQ/1
40 TABLET, EXTENDED RELEASE ORAL 2 TIMES DAILY
Status: DISCONTINUED | OUTPATIENT
Start: 2024-11-06 | End: 2024-11-08 | Stop reason: HOSPADM

## 2024-11-05 RX ADMIN — IBUPROFEN 800 MG: 800 TABLET, FILM COATED ORAL at 06:11

## 2024-11-05 RX ADMIN — FLUCONAZOLE 200 MG: 2 INJECTION, SOLUTION INTRAVENOUS at 07:11

## 2024-11-05 RX ADMIN — PIPERACILLIN AND TAZOBACTAM 4.5 G: 4; .5 INJECTION, POWDER, LYOPHILIZED, FOR SOLUTION INTRAVENOUS; PARENTERAL at 08:11

## 2024-11-05 RX ADMIN — Medication 600 ML: at 08:11

## 2024-11-05 RX ADMIN — POTASSIUM CHLORIDE 40 MEQ: 1500 TABLET, EXTENDED RELEASE ORAL at 08:11

## 2024-11-05 RX ADMIN — SODIUM CHLORIDE, POTASSIUM CHLORIDE, SODIUM LACTATE AND CALCIUM CHLORIDE 500 ML: 600; 310; 30; 20 INJECTION, SOLUTION INTRAVENOUS at 10:11

## 2024-11-05 RX ADMIN — SODIUM CHLORIDE 1000 ML: 9 INJECTION, SOLUTION INTRAVENOUS at 05:11

## 2024-11-05 RX ADMIN — MORPHINE SULFATE 4 MG: 4 INJECTION INTRAVENOUS at 06:11

## 2024-11-05 RX ADMIN — ONDANSETRON 4 MG: 2 INJECTION INTRAMUSCULAR; INTRAVENOUS at 06:11

## 2024-11-05 RX ADMIN — KETOROLAC TROMETHAMINE 60 MG: 30 INJECTION, SOLUTION INTRAMUSCULAR; INTRAVENOUS at 04:11

## 2024-11-05 NOTE — Clinical Note
Diagnosis: Urinary tract infection without hematuria, site unspecified [[id number]]   Future Attending Provider: SHERI ZAVALA [[id number]]   Special Needs:: Fall Risk [15]

## 2024-11-05 NOTE — PROGRESS NOTES
Subjective:       Patient ID: Rosa Rutledge is a 44 y.o. female.    Chief Complaint: Medication Refill    Patient is an 44 y.o female who present to clinic today with c/o fever, fatigue, Nausea, abdominal pain. Patient was seen in ED yesterday for abdominal pain and UTI. Patient still has elevated HR with weakness. Patient not able to keep medication down. Patient very dehydrated she was recently diagnosed with Crohns disease. Patient states she has not been feeling well at all. Patient looks to be in distress. She looks weak and tired. Pateint has been taking azathioprine for about two weeks and noticed symptoms getting. Worse. Patient states she still has not been able to keep anything down and really needs to be seen by her gastroenterologist. Patient does not look stable and we will send to ED.        No current facility-administered medications for this visit.  No current outpatient medications on file.    Facility-Administered Medications Ordered in Other Visits:     acetaminophen tablet 650 mg, 650 mg, Oral, Q4H PRN, Vikram Rincon DO    azaTHIOprine tablet 100 mg, 100 mg, Oral, Daily, Vikram Rincon DO, 100 mg at 11/06/24 1057    budesonide nebulizer solution 0.5 mg, 0.5 mg, Nebulization, BID PRN, Vikram Rincon DO    cefTRIAXone injection 1 g, 1 g, Intravenous, Q24H, Vikram Rincon DO, 1 g at 11/06/24 0901    dextrose 5 % and 0.45 % NaCl with KCl 20 mEq infusion, , Intravenous, Continuous, Johnnie Henderson MD, Last Rate: 100 mL/hr at 11/06/24 0908, New Bag at 11/06/24 0908    electrolytes-dextrose (Pedialyte) oral solution 600 mL, 600 mL, Oral, Q4H, Treva Gómez, FNP, 600 mL at 11/06/24 0906    glucagon (human recombinant) injection 1 mg, 1 mg, Intramuscular, PRN, Vikram Rincon DO    glucose chewable tablet 16 g, 16 g, Oral, PRN, Vikram Rincon DO    glucose chewable tablet 24 g, 24 g, Oral, PRN, Vikram Rincon DO    polyethylene glycol packet 17 g, 17 g, Oral, BID PRN, Vikrma Rincon  DO    potassium chloride SA CR tablet 40 mEq, 40 mEq, Oral, BID, Vikram Rincon, DO, 40 mEq at 11/06/24 0901    Review of patient's allergies indicates:  No Known Allergies    Past Medical History:   Diagnosis Date    Asthma     COVID     Hypertension     Kidney stone        Past Surgical History:   Procedure Laterality Date    ADENOIDECTOMY      BREAST SURGERY Bilateral     reduction    CHOLECYSTECTOMY  11/12/2020    CYSTOURETEROSCOPY, WITH HOLMIUM LASER LITHOTRIPSY OF URETERAL CALCULUS AND STENT INSERTION Right 5/16/2024    Procedure: CYSTOURETEROSCOPY WITH HOLMIUM LASER LITHOTRIPSY OF URETERAL CALCULUS, STENT INSERTION AND INDICATED PROCEDURES;  Surgeon: Luc Howard Jr., MD;  Location: [facility];  Service: Urology;  Laterality: Right;    HYSTERECTOMY  2016    Full    REDUCTION OF BOTH BREASTS  01/09/2020    TONSILLECTOMY      TOTAL REDUCTION MAMMOPLASTY      TUBAL LIGATION         Family History   Problem Relation Name Age of Onset    Diabetes Mother      Hypertension Mother      Diabetes Maternal Grandmother      Hypertension Maternal Grandmother      Asthma Maternal Grandmother      Asthma Sister      Hypertension Sister         Social History     Tobacco Use    Smoking status: Never    Smokeless tobacco: Never   Substance Use Topics    Alcohol use: Never    Drug use: Never       Review of Systems   Constitutional:  Positive for activity change, fatigue and fever.   Respiratory:  Positive for wheezing.    Gastrointestinal:  Positive for abdominal pain and nausea.   Musculoskeletal:  Positive for arthralgias.   Neurological:  Positive for weakness and headaches.         Current Medications:   Medication List with Changes/Refills   Current Medications    ADALIMUMAB (HUMIRA PEN) PNKT INJECTION    Inject 1 pen  (40 mg total) into the skin every 14 (fourteen) days.       Start Date: 10/30/2024End Date: 10/30/2025    ADALIMUMAB (HUMIRA,CF, PEN) 80 MG/0.8 ML PNKT    160 mg (given over 1 or 2 days), then 80 mg  2 weeks later on day 15.       Start Date: 10/30/2024End Date: --    AZATHIOPRINE (IMURAN) 100 MG TABLET    Take 1 tablet (100 mg total) by mouth once daily.       Start Date: 10/17/2024End Date: 10/17/2025    BUDESONIDE 180MCG (PULMICORT FLEXHALER) 180 MCG/ACTUATION AEPB    Inhale 2 puffs into the lungs every 4 to 6 hours as needed.       Start Date: --        End Date: --    CEPHALEXIN (KEFLEX) 500 MG CAPSULE    Take 1 capsule (500 mg total) by mouth every 12 (twelve) hours. for 7 days       Start Date: 11/4/2024 End Date: 11/11/2024    ERGOCALCIFEROL (ERGOCALCIFEROL) 50,000 UNIT CAP    TAKE 1 CAPSULE BY MOUTH TWICE A WEEK       Start Date: 6/20/2024 End Date: --    LINZESS 145 MCG CAP CAPSULE    Take 2 capsules (290 mcg total) by mouth before breakfast.       Start Date: 10/24/2024End Date: 10/24/2025    MAGNESIUM OXIDE 400 MG MAGNESIUM TAB    Take 1 tablet by mouth 2 (two) times daily.       Start Date: 11/4/2024 End Date: --    NEOMYCIN-POLYMYXIN-HYDROCORTISONE (CORTISPORIN) 3.5-10,000-1 MG/ML-UNIT/ML-% OTIC SUSPENSION    Place 3 drops into the left ear 2 (two) times a day.       Start Date: 10/31/2024End Date: --    NURTEC 75 MG ODT    DISSOLVE 1 TABLET ON THE TONGUE AT ONSET HEADACHE       Start Date: 10/23/2024End Date: --    POTASSIUM CHLORIDE (KLOR-CON) 10 MEQ TBSR    Take 1 tablet (10 mEq total) by mouth once daily.       Start Date: 2/15/2024 End Date: --    POTASSIUM CHLORIDE SA (K-DUR,KLOR-CON) 20 MEQ TABLET    Take 1 tablet (20 mEq total) by mouth 2 (two) times daily. for 3 days       Start Date: 11/4/2024 End Date: 11/7/2024   Changed and/or Refilled Medications    Modified Medication Previous Medication    FLUCONAZOLE (DIFLUCAN) 150 MG TAB fluconazole (DIFLUCAN) 150 MG Tab       Take 1 tablet (150 mg total) by mouth once daily. for 1 day    Take 1 tablet (150 mg total) by mouth once daily. for 1 day       Start Date: 11/5/2024 End Date: 11/6/2024    Start Date: 11/4/2024 End Date: 11/5/2024     "LOSARTAN (COZAAR) 50 MG TABLET losartan (COZAAR) 50 MG tablet       Take 1 tablet (50 mg total) by mouth once daily.    Take 1 tablet (50 mg total) by mouth once daily.       Start Date: 11/5/2024 End Date: 10/31/2025    Start Date: 2/15/2024 End Date: 11/5/2024            Objective:        Vitals:    11/05/24 1544   BP: 93/60   BP Location: Left arm   Patient Position: Sitting   Pulse: (!) 122   Resp: 18   Temp: (!) 100.4 °F (38 °C)   TempSrc: Oral   SpO2: 95%   Weight: 117.9 kg (260 lb)   Height: 5' 1" (1.549 m)       Physical Exam  Constitutional:       General: She is in acute distress.      Appearance: She is obese. She is ill-appearing.   Cardiovascular:      Pulses: Normal pulses.      Heart sounds: Normal heart sounds.   Pulmonary:      Effort: Pulmonary effort is normal.      Breath sounds: Normal breath sounds.   Abdominal:      Tenderness: There is abdominal tenderness.   Skin:     General: Skin is warm and dry.   Neurological:      Mental Status: She is alert.             Lab Results   Component Value Date    WBC 17.25 (H) 11/06/2024    HGB 10.0 (L) 11/06/2024    HCT 33.4 (L) 11/06/2024     11/06/2024    CHOL 164 02/15/2024    TRIG 86 02/15/2024    HDL 73 (H) 02/15/2024    ALT 40 11/06/2024    AST 28 11/06/2024     11/06/2024    K 3.7 11/06/2024     (H) 11/06/2024    CREATININE 0.82 11/06/2024    BUN 11 11/06/2024    CO2 24 11/06/2024    TSH 1.140 11/14/2023    INR 1.03 09/20/2024    HGBA1C 5.3 11/14/2023      Assessment:       1. Generalized abdominal pain    2. Primary hypertension    3. Medication refill        Plan:         Problem List Items Addressed This Visit          Cardiac/Vascular    Hypertension    Relevant Medications    losartan (COZAAR) 50 MG tablet       GI    Generalized abdominal pain - Primary     Pain 8/10. Patient states pain is all over. She is also weak  Patient given Toradol 60 mg IM and sent to ED.          Other Visit Diagnoses       Medication refill        " Relevant Medications    losartan (COZAAR) 50 MG tablet              Follow up if symptoms worsen or fail to improve.    Sourav Chavez MD     Instructed patient that if symptoms fail to improve or worsen patient should seek immediate medical attention or report to the nearest emergency department. Patient expressed verbal agreement and understanding to this plan of care.

## 2024-11-05 NOTE — ASSESSMENT & PLAN NOTE
Pain 8/10. Patient states pain is all over. She is also weak  Patient given Toradol 60 mg IM and sent to ED.

## 2024-11-06 PROBLEM — Z87.19 HISTORY OF CROHN'S DISEASE: Status: ACTIVE | Noted: 2024-11-06

## 2024-11-06 PROBLEM — R10.31 RIGHT LOWER QUADRANT ABDOMINAL PAIN: Status: ACTIVE | Noted: 2024-11-06

## 2024-11-06 LAB
ALBUMIN SERPL BCP-MCNC: 2.3 G/DL (ref 3.5–5)
ALBUMIN/GLOB SERPL: 0.7 {RATIO}
ALP SERPL-CCNC: 140 U/L (ref 39–100)
ALT SERPL W P-5'-P-CCNC: 40 U/L (ref 13–56)
ANION GAP SERPL CALCULATED.3IONS-SCNC: 12 MMOL/L (ref 7–16)
AST SERPL W P-5'-P-CCNC: 28 U/L (ref 15–37)
BASOPHILS # BLD AUTO: 0.06 K/UL (ref 0–0.2)
BASOPHILS NFR BLD AUTO: 0.3 % (ref 0–1)
BILIRUB DIRECT SERPL-MCNC: 1.5 MG/DL (ref 0–0.2)
BILIRUB SERPL-MCNC: 2.5 MG/DL (ref ?–1.2)
BUN SERPL-MCNC: 11 MG/DL (ref 7–18)
BUN/CREAT SERPL: 13 (ref 6–20)
C COLI+JEJ+UPSA DNA STL QL NAA+NON-PROBE: NEGATIVE
C DIFF TOX A+B STL QL IA: NEGATIVE
CALCIUM SERPL-MCNC: 8.1 MG/DL (ref 8.5–10.1)
CHLORIDE SERPL-SCNC: 108 MMOL/L (ref 98–107)
CLOSTRIDIUM DIFFICILE GDH ANTIGEN (OHS): NEGATIVE
CO2 SERPL-SCNC: 24 MMOL/L (ref 21–32)
CREAT SERPL-MCNC: 0.82 MG/DL (ref 0.55–1.02)
CRP SERPL-MCNC: 14.3 MG/DL (ref 0–0.8)
DIFFERENTIAL METHOD BLD: ABNORMAL
E COLI SXT1 STL QL IA: NEGATIVE
E COLI SXT2 STL QL IA: NEGATIVE
EGFR (NO RACE VARIABLE) (RUSH/TITUS): 89 ML/MIN/1.73M2
EOSINOPHIL # BLD AUTO: 0.49 K/UL (ref 0–0.5)
EOSINOPHIL NFR BLD AUTO: 2.8 % (ref 1–4)
ERYTHROCYTE [DISTWIDTH] IN BLOOD BY AUTOMATED COUNT: 14.4 % (ref 11.5–14.5)
GLOBULIN SER-MCNC: 3.1 G/DL (ref 2–4)
GLUCOSE SERPL-MCNC: 96 MG/DL (ref 74–106)
HCT VFR BLD AUTO: 33.4 % (ref 38–47)
HGB BLD-MCNC: 10 G/DL (ref 12–16)
IMM GRANULOCYTES # BLD AUTO: 0.15 K/UL (ref 0–0.04)
IMM GRANULOCYTES NFR BLD: 0.9 % (ref 0–0.4)
LYMPHOCYTES # BLD AUTO: 0.66 K/UL (ref 1–4.8)
LYMPHOCYTES NFR BLD AUTO: 3.8 % (ref 27–41)
MAGNESIUM SERPL-MCNC: 2 MG/DL (ref 1.7–2.3)
MCH RBC QN AUTO: 24.6 PG (ref 27–31)
MCHC RBC AUTO-ENTMCNC: 29.9 G/DL (ref 32–36)
MCV RBC AUTO: 82.3 FL (ref 80–96)
MONOCYTES # BLD AUTO: 0.2 K/UL (ref 0–0.8)
MONOCYTES NFR BLD AUTO: 1.2 % (ref 2–6)
MPC BLD CALC-MCNC: 12.1 FL (ref 9.4–12.4)
NEUTROPHILS # BLD AUTO: 15.69 K/UL (ref 1.8–7.7)
NEUTROPHILS NFR BLD AUTO: 91 % (ref 53–65)
NOROVIRUS GI+II RNA STL QL NAA+NON-PROBE: NEGATIVE
NRBC # BLD AUTO: 0 X10E3/UL
NRBC, AUTO (.00): 0 %
PHOSPHATE SERPL-MCNC: 3.6 MG/DL (ref 2.5–4.5)
PLATELET # BLD AUTO: 311 K/UL (ref 150–400)
POTASSIUM SERPL-SCNC: 3.7 MMOL/L (ref 3.5–5.1)
PROT SERPL-MCNC: 5.4 G/DL (ref 6.4–8.2)
RBC # BLD AUTO: 4.06 M/UL (ref 4.2–5.4)
RVA RNA STL QL NAA+NON-PROBE: NEGATIVE
S ENT+BONG DNA STL QL NAA+NON-PROBE: NEGATIVE
SHIGELLA SPECIES NAT: NEGATIVE
SODIUM SERPL-SCNC: 140 MMOL/L (ref 136–145)
UA COMPLETE W REFLEX CULTURE PNL UR: NORMAL
V CHOL+PARA+VUL DNA STL QL NAA+NON-PROBE: NEGATIVE
WBC # BLD AUTO: 17.25 K/UL (ref 4.5–11)
Y ENTEROCOL DNA STL QL NAA+NON-PROBE: NEGATIVE

## 2024-11-06 PROCEDURE — 63600175 PHARM REV CODE 636 W HCPCS: Performed by: INTERNAL MEDICINE

## 2024-11-06 PROCEDURE — 36415 COLL VENOUS BLD VENIPUNCTURE: CPT

## 2024-11-06 PROCEDURE — 83735 ASSAY OF MAGNESIUM: CPT

## 2024-11-06 PROCEDURE — 87324 CLOSTRIDIUM AG IA: CPT

## 2024-11-06 PROCEDURE — 80053 COMPREHEN METABOLIC PANEL: CPT

## 2024-11-06 PROCEDURE — 93005 ELECTROCARDIOGRAM TRACING: CPT

## 2024-11-06 PROCEDURE — 86140 C-REACTIVE PROTEIN: CPT | Performed by: INTERNAL MEDICINE

## 2024-11-06 PROCEDURE — 25000003 PHARM REV CODE 250: Performed by: NURSE PRACTITIONER

## 2024-11-06 PROCEDURE — 63600175 PHARM REV CODE 636 W HCPCS

## 2024-11-06 PROCEDURE — 63600175 PHARM REV CODE 636 W HCPCS: Performed by: HOSPITALIST

## 2024-11-06 PROCEDURE — 11000001 HC ACUTE MED/SURG PRIVATE ROOM

## 2024-11-06 PROCEDURE — 85025 COMPLETE CBC W/AUTO DIFF WBC: CPT

## 2024-11-06 PROCEDURE — 84100 ASSAY OF PHOSPHORUS: CPT

## 2024-11-06 PROCEDURE — 87506 IADNA-DNA/RNA PROBE TQ 6-11: CPT

## 2024-11-06 PROCEDURE — 25000003 PHARM REV CODE 250

## 2024-11-06 PROCEDURE — 83993 ASSAY FOR CALPROTECTIN FECAL: CPT | Mod: 90 | Performed by: INTERNAL MEDICINE

## 2024-11-06 PROCEDURE — 99223 1ST HOSP IP/OBS HIGH 75: CPT | Mod: ,,, | Performed by: INTERNAL MEDICINE

## 2024-11-06 PROCEDURE — 99233 SBSQ HOSP IP/OBS HIGH 50: CPT | Mod: ,,, | Performed by: HOSPITALIST

## 2024-11-06 PROCEDURE — 63700000 PHARM REV CODE 250 ALT 637 W/O HCPCS

## 2024-11-06 PROCEDURE — 82248 BILIRUBIN DIRECT: CPT | Performed by: INTERNAL MEDICINE

## 2024-11-06 RX ORDER — DEXTROSE MONOHYDRATE, SODIUM CHLORIDE, AND POTASSIUM CHLORIDE 50; 1.49; 4.5 G/1000ML; G/1000ML; G/1000ML
INJECTION, SOLUTION INTRAVENOUS CONTINUOUS
Status: DISPENSED | OUTPATIENT
Start: 2024-11-06 | End: 2024-11-06

## 2024-11-06 RX ORDER — MORPHINE SULFATE 4 MG/ML
4 INJECTION, SOLUTION INTRAMUSCULAR; INTRAVENOUS EVERY 6 HOURS PRN
Status: DISCONTINUED | OUTPATIENT
Start: 2024-11-06 | End: 2024-11-08 | Stop reason: HOSPADM

## 2024-11-06 RX ADMIN — Medication 600 ML: at 03:11

## 2024-11-06 RX ADMIN — SODIUM CHLORIDE, SODIUM LACTATE, POTASSIUM CHLORIDE, AND CALCIUM CHLORIDE 500 ML: 600; 310; 30; 20 INJECTION, SOLUTION INTRAVENOUS at 09:11

## 2024-11-06 RX ADMIN — Medication 600 ML: at 04:11

## 2024-11-06 RX ADMIN — CEFTRIAXONE SODIUM 1 G: 1 INJECTION, POWDER, FOR SOLUTION INTRAMUSCULAR; INTRAVENOUS at 09:11

## 2024-11-06 RX ADMIN — Medication 600 ML: at 12:11

## 2024-11-06 RX ADMIN — AZATHIOPRINE 100 MG: 100 TABLET ORAL at 10:11

## 2024-11-06 RX ADMIN — Medication 600 ML: at 08:11

## 2024-11-06 RX ADMIN — POTASSIUM CHLORIDE 40 MEQ: 1500 TABLET, EXTENDED RELEASE ORAL at 08:11

## 2024-11-06 RX ADMIN — ACETAMINOPHEN 650 MG: 325 TABLET ORAL at 08:11

## 2024-11-06 RX ADMIN — MORPHINE SULFATE 4 MG: 4 INJECTION INTRAVENOUS at 05:11

## 2024-11-06 RX ADMIN — POTASSIUM CHLORIDE 40 MEQ: 1500 TABLET, EXTENDED RELEASE ORAL at 09:11

## 2024-11-06 RX ADMIN — POTASSIUM CHLORIDE, DEXTROSE MONOHYDRATE AND SODIUM CHLORIDE: 150; 5; 450 INJECTION, SOLUTION INTRAVENOUS at 09:11

## 2024-11-06 RX ADMIN — Medication 600 ML: at 09:11

## 2024-11-06 NOTE — ASSESSMENT & PLAN NOTE
Patient was recently diagnosed with crohns disease, reporting lose/runny bowel movements will consult GI. Enteric pathogen and C.diff panel pending.

## 2024-11-06 NOTE — SUBJECTIVE & OBJECTIVE
Past Medical History:   Diagnosis Date    Asthma     COVID     Hypertension     Kidney stone        Past Surgical History:   Procedure Laterality Date    ADENOIDECTOMY      BREAST SURGERY Bilateral     reduction    CHOLECYSTECTOMY  11/12/2020    CYSTOURETEROSCOPY, WITH HOLMIUM LASER LITHOTRIPSY OF URETERAL CALCULUS AND STENT INSERTION Right 5/16/2024    Procedure: CYSTOURETEROSCOPY WITH HOLMIUM LASER LITHOTRIPSY OF URETERAL CALCULUS, STENT INSERTION AND INDICATED PROCEDURES;  Surgeon: Luc Howard Jr., MD;  Location: [facility];  Service: Urology;  Laterality: Right;    HYSTERECTOMY  2016    Full    REDUCTION OF BOTH BREASTS  01/09/2020    TONSILLECTOMY      TOTAL REDUCTION MAMMOPLASTY      TUBAL LIGATION         Review of patient's allergies indicates:  No Known Allergies    Current Facility-Administered Medications on File Prior to Encounter   Medication    [COMPLETED] ketorolac injection 60 mg     Current Outpatient Medications on File Prior to Encounter   Medication Sig    azaTHIOprine (IMURAN) 100 mg tablet Take 1 tablet (100 mg total) by mouth once daily.    budesonide 180mcg (PULMICORT FLEXHALER) 180 mcg/actuation AePB Inhale 2 puffs into the lungs every 4 to 6 hours as needed.    cephALEXin (KEFLEX) 500 MG capsule Take 1 capsule (500 mg total) by mouth every 12 (twelve) hours. for 7 days    ergocalciferol (ERGOCALCIFEROL) 50,000 unit Cap TAKE 1 CAPSULE BY MOUTH TWICE A WEEK    fluconazole (DIFLUCAN) 150 MG Tab Take 1 tablet (150 mg total) by mouth once daily. for 1 day    LINZESS 145 mcg Cap capsule Take 2 capsules (290 mcg total) by mouth before breakfast.    losartan (COZAAR) 50 MG tablet Take 1 tablet (50 mg total) by mouth once daily.    magnesium oxide 400 mg magnesium Tab Take 1 tablet by mouth 2 (two) times daily.    neomycin-polymyxin-hydrocortisone (CORTISPORIN) 3.5-10,000-1 mg/mL-unit/mL-% otic suspension Place 3 drops into the left ear 2 (two) times a day.    potassium chloride (KLOR-CON) 10  MEQ TbSR Take 1 tablet (10 mEq total) by mouth once daily.    potassium chloride SA (K-DUR,KLOR-CON) 20 MEQ tablet Take 1 tablet (20 mEq total) by mouth 2 (two) times daily. for 3 days    adalimumab (HUMIRA PEN) PnKt injection Inject 1 pen  (40 mg total) into the skin every 14 (fourteen) days. (Patient not taking: Reported on 11/5/2024)    adalimumab (HUMIRA,CF, PEN) 80 mg/0.8 mL PnKt 160 mg (given over 1 or 2 days), then 80 mg 2 weeks later on day 15. (Patient not taking: Reported on 11/5/2024)    NURTEC 75 mg odt DISSOLVE 1 TABLET ON THE TONGUE AT ONSET HEADACHE    [DISCONTINUED] amoxicillin (AMOXIL) 500 MG capsule Take 1 capsule (500 mg total) by mouth 2 (two) times a day. (Patient not taking: Reported on 11/5/2024)    [DISCONTINUED] fluconazole (DIFLUCAN) 150 MG Tab Take 1 tablet (150 mg total) by mouth once daily. for 1 day    [DISCONTINUED] gabapentin (NEURONTIN) 300 MG capsule Take 300 mg by mouth 3 (three) times daily. (Patient not taking: Reported on 11/5/2024)    [DISCONTINUED] losartan (COZAAR) 50 MG tablet Take 1 tablet (50 mg total) by mouth once daily.    [DISCONTINUED] promethazine (PHENERGAN) 25 MG tablet TAKE 1 TABLET BY MOUTH EVERY 8 HOURS AS NEEDED FOR MIGRAINE    [DISCONTINUED] semaglutide, weight loss, (WEGOVY) 1 mg/0.5 mL PnIj Inject 1 mg into the skin every 7 days. (Patient not taking: Reported on 11/5/2024)     Family History       Problem Relation (Age of Onset)    Asthma Maternal Grandmother, Sister    Diabetes Mother, Maternal Grandmother    Hypertension Mother, Maternal Grandmother, Sister          Tobacco Use    Smoking status: Never    Smokeless tobacco: Never   Substance and Sexual Activity    Alcohol use: Never    Drug use: Never    Sexual activity: Yes     Partners: Male     Review of Systems   Constitutional:  Positive for chills and fever. Negative for fatigue.   Respiratory:  Negative for cough.    Cardiovascular:  Negative for chest pain.   Gastrointestinal:  Positive for  abdominal pain and diarrhea. Negative for nausea and vomiting.   Genitourinary:  Positive for difficulty urinating, dysuria, flank pain, frequency and urgency. Negative for hematuria.   Neurological:  Positive for weakness. Negative for headaches.     Objective:     Vital Signs (Most Recent):  Temp: 98.6 °F (37 °C) (11/05/24 1936)  Pulse: 104 (11/05/24 1936)  Resp: 19 (11/05/24 1936)  BP: (!) 96/59 (11/05/24 1936)  SpO2: 95 % (11/05/24 1936) Vital Signs (24h Range):  Temp:  [98.6 °F (37 °C)-101.6 °F (38.7 °C)] 98.6 °F (37 °C)  Pulse:  [104-122] 104  Resp:  [17-20] 19  SpO2:  [95 %-97 %] 95 %  BP: ()/(59-74) 96/59     Weight: 117.9 kg (260 lb)  Body mass index is 49.13 kg/m².     Physical Exam  Vitals and nursing note reviewed.   Constitutional:       Appearance: Normal appearance.   HENT:      Head: Normocephalic.      Right Ear: External ear normal.      Left Ear: External ear normal.      Nose: Nose normal.      Mouth/Throat:      Pharynx: Oropharynx is clear.   Eyes:      Conjunctiva/sclera: Conjunctivae normal.   Cardiovascular:      Rate and Rhythm: Normal rate and regular rhythm.      Pulses: Normal pulses.      Heart sounds: Normal heart sounds.   Pulmonary:      Effort: Pulmonary effort is normal.      Breath sounds: Normal breath sounds.   Abdominal:      General: Abdomen is flat. There is no distension.      Palpations: Abdomen is soft. There is no mass.      Tenderness: There is no abdominal tenderness.      Hernia: No hernia is present.   Musculoskeletal:      Right lower leg: No edema.      Left lower leg: No edema.   Skin:     General: Skin is warm.   Neurological:      Mental Status: She is alert and oriented to person, place, and time.   Psychiatric:         Mood and Affect: Mood normal.         Behavior: Behavior normal.         Thought Content: Thought content normal.         Judgment: Judgment normal.                Significant Labs: All pertinent labs within the past 24 hours have been  reviewed.    Significant Imaging: I have reviewed all pertinent imaging results/findings within the past 24 hours.

## 2024-11-06 NOTE — CONSULTS
CC: Crohn's disease    HPI 44 y.o. female with history of ileal Crohn's disease recently initiated on Imuran and plan to start Humira induction therapy. She has been approved but has not received medication yet for starting. Started having issues with an ear infection, took amoxicillin and started having severe abdominal pain. She normally has approximately 2 bowel movements daily usually but then started having 4 loose non-bloody bowel movements daily as well as abdominal pain after taking antibiotics. Reported severe abdominal pain in right lower quadrant with nausea that started Thursday. Inability to tolerate diet. She was not eating or drinking much and PO intake was poor. WBC 14.59 on admission, increased to 17.25. H/H 10/33.4. Tbili jumped to 2.5 and AP increased now 140.     CT showed nonspecific intraluminal fluid within loops of normal caliber colon, as may be seen the setting of diarrheal illness as well as chronic and incidental findings, as provided in the body of the report. Enteric pathogen panel normal. C.diff in process. UA with WBC, mod bacteria, culture neg. Bcx ngtd.     Past Medical History:   Diagnosis Date    Asthma     COVID     Hypertension     Kidney stone      Past Surgical History:   Procedure Laterality Date    ADENOIDECTOMY      BREAST SURGERY Bilateral     reduction    CHOLECYSTECTOMY  11/12/2020    CYSTOURETEROSCOPY, WITH HOLMIUM LASER LITHOTRIPSY OF URETERAL CALCULUS AND STENT INSERTION Right 5/16/2024    Procedure: CYSTOURETEROSCOPY WITH HOLMIUM LASER LITHOTRIPSY OF URETERAL CALCULUS, STENT INSERTION AND INDICATED PROCEDURES;  Surgeon: Luc Howard Jr., MD;  Location: [facility];  Service: Urology;  Laterality: Right;    HYSTERECTOMY  2016    Full    REDUCTION OF BOTH BREASTS  01/09/2020    TONSILLECTOMY      TOTAL REDUCTION MAMMOPLASTY      TUBAL LIGATION       Social History  Social History     Tobacco Use    Smoking status: Never    Smokeless tobacco: Never   Substance Use  "Topics    Alcohol use: Never    Drug use: Never     Family History   Problem Relation Name Age of Onset    Diabetes Mother      Hypertension Mother      Diabetes Maternal Grandmother      Hypertension Maternal Grandmother      Asthma Maternal Grandmother      Asthma Sister      Hypertension Sister         Review of Systems  General ROS: negative for chills, fever or weight loss  Psychological ROS: negative for hallucination, depression or suicidal ideation  Ophthalmic ROS: negative for blurry vision, photophobia or eye pain  ENT ROS: negative for epistaxis, sore throat or rhinorrhea  Respiratory ROS: no cough, shortness of breath, or wheezing  Cardiovascular ROS: no chest pain or dyspnea on exertion  Gastrointestinal ROS: no abdominal pain, change in bowel habits, or black/ bloody stools  Genito-Urinary ROS: no dysuria, trouble voiding, or hematuria  Musculoskeletal ROS: negative for gait disturbance or muscular weakness  Neurological ROS: no syncope or seizures; no ataxia  Dermatological ROS: negative for pruritis, rash and jaundice    Physical Examination  BP 94/64 (Patient Position: Lying)   Pulse 91   Temp 97.4 °F (36.3 °C) (Axillary)   Resp (!) 24   Ht 5' 2" (1.575 m)   Wt 122.4 kg (269 lb 12.8 oz)   SpO2 99%   Breastfeeding No   BMI 49.35 kg/m²   General appearance: alert, cooperative, no distress  HENT: Normocephalic, atraumatic, neck symmetrical, no nasal discharge   Eyes: conjunctivae/corneas clear, PERRL, EOM's intact  Lungs: no labored breathing, symmetric chest wall expansion bilaterally  Heart: regular rate and rhythm without rub; no displacement of the PMI   Abdomen: soft, non-tender; bowel sounds normoactive; no organomegaly  Extremities: extremities symmetric; no clubbing, cyanosis, or edema  Integument: Skin color, texture, turgor normal; no rashes; hair distrubution normal  Neurologic: Alert and oriented X 3, normal strength, normal coordination and gait  Psychiatric: no pressured speech; " normal affect; no evidence of impaired cognition     Labs:  Lab Results   Component Value Date    WBC 17.25 (H) 11/06/2024    HGB 10.0 (L) 11/06/2024    HCT 33.4 (L) 11/06/2024    MCV 82.3 11/06/2024     11/06/2024       CMP  Sodium   Date Value Ref Range Status   11/06/2024 140 136 - 145 mmol/L Final     Potassium   Date Value Ref Range Status   11/06/2024 3.7 3.5 - 5.1 mmol/L Final     Chloride   Date Value Ref Range Status   11/06/2024 108 (H) 98 - 107 mmol/L Final     CO2   Date Value Ref Range Status   11/06/2024 24 21 - 32 mmol/L Final     Glucose   Date Value Ref Range Status   11/06/2024 96 74 - 106 mg/dL Final     BUN   Date Value Ref Range Status   11/06/2024 11 7 - 18 mg/dL Final     Creatinine   Date Value Ref Range Status   11/06/2024 0.82 0.55 - 1.02 mg/dL Final     Calcium   Date Value Ref Range Status   11/06/2024 8.1 (L) 8.5 - 10.1 mg/dL Final     Total Protein   Date Value Ref Range Status   11/06/2024 5.4 (L) 6.4 - 8.2 g/dL Final     Albumin   Date Value Ref Range Status   11/06/2024 2.3 (L) 3.5 - 5.0 g/dL Final     Bilirubin, Total   Date Value Ref Range Status   11/06/2024 2.5 (H) >0.0 - 1.2 mg/dL Final     Alk Phos   Date Value Ref Range Status   11/06/2024 140 (H) 39 - 100 U/L Final     AST   Date Value Ref Range Status   11/06/2024 28 15 - 37 U/L Final     ALT   Date Value Ref Range Status   11/06/2024 40 13 - 56 U/L Final     Anion Gap   Date Value Ref Range Status   11/06/2024 12 7 - 16 mmol/L Final     eGFR    Date Value Ref Range Status   11/10/2021 121 >=60 mL/min/1.73m² Final     eGFR   Date Value Ref Range Status   03/04/2022 134 >=60 mL/min/1.73m² Final       Imaging:  CT A/P:  1. Nonspecific intraluminal fluid within loops of normal caliber colon, as may be seen the setting of diarrheal illness.  2. Additional details of chronic and incidental findings, as provided in the body of the report.     Assessment:   Ileal Crohn's  disease  Hypoalbuminemia  Leukocytosis  Abnormal LFTs  Anemia, normocytic    Plan:     -Clear liquid diet then advance as tolerates  -Await stool studies for c.diff  -Continue current treatment per primary team  -Monitor leukocytosis and abnormal LFTs  -Suspect this episode is unrelated to underlying ileal Crohn's disease and most likely infection related-  -Check CRP, fecal calprotectin  -Once infection improved, tolerating diet then can start Humira in outpatient setting        Collins Gonsalez MD  [facility]

## 2024-11-06 NOTE — ASSESSMENT & PLAN NOTE
Patient's most recent potassium results are listed below.   Recent Labs     11/04/24  0755 11/05/24  1750   K 2.7* 2.6*     Plan  - Replete potassium per protocol  - Monitor potassium Every 8 hours  - Patient's hypokalemia is worsening. Will adjust treatment as follows: add PO potassium BID  -

## 2024-11-06 NOTE — PLAN OF CARE
[facility]  Initial Discharge Assessment       Primary Care Provider: No, Primary Doctor    Admission Diagnosis: History of Crohn's disease [Z87.19]  Right lower quadrant abdominal pain [R10.31]  Nausea vomiting and diarrhea [R11.2, R19.7]  Urinary tract infection without hematuria, site unspecified [N39.0]    Admission Date: 11/5/2024  Expected Discharge Date:     Transition of Care Barriers: None    Payor: BLUE CROSS [employer] BENEFIT / Plan: [employer] BLUE CROSS [place] / Product Type: Self Funded /     Extended Emergency Contact Information  Primary Emergency Contact: Gerardo Noel  Address: [facility] [address] [place] [address]  Home Phone: [phone]  Mobile Phone: [phone]  Relation: Son   needed? No    Discharge Plan A: Home with family  Discharge Plan B: Home with family      [facility] [address] [place] [address] [address] [facility] [address]  [address] [facility] [address]  [employer] [address]  Phone: [phone] Fax: [phone]    [facility] [place] - [address] [place] [address]  [address] [place] [address]  Phone: [phone] Fax: [phone]    [facility]  [address] [place] [address]  Phone: [phone] Fax: [phone]      Initial Assessment (most recent)       Adult Discharge Assessment - 11/06/24 0941          Discharge Assessment    Assessment Type Discharge Planning Assessment     Confirmed/corrected address, phone number and insurance Yes     Confirmed Demographics Correct on Facesheet     Source of Information patient     People in Home child(elijah), adult;child(elijah), dependent     Do you expect to return to your current living situation? Yes     Do you have help at home or someone to help you manage your care at home? Yes     Who are your caregiver(s) and their phone number(s)? Noel Carrillo, son, [phone]     Prior to hospitilization cognitive status: Unable to Assess     Current  cognitive status: Alert/Oriented     Walking or Climbing Stairs Difficulty no     Dressing/Bathing Difficulty no     Home Accessibility wheelchair accessible     Home Layout Able to live on 1st floor     Equipment Currently Used at Home none     Readmission within 30 days? No     Patient currently being followed by outpatient case management? No     Do you currently have service(s) that help you manage your care at home? No     Do you take prescription medications? Yes     Do you have prescription coverage? Yes     Coverage [employer]     Do you have any problems affording any of your prescribed medications? No     Is the patient taking medications as prescribed? yes     Who is going to help you get home at discharge? Jia Carrillo, son, [phone]     How do you get to doctors appointments? car, drives self;family or friend will provide     Are you on dialysis? No     Do you take coumadin? No     Discharge Plan A Home with family     Discharge Plan B Home with family     DME Needed Upon Discharge  none     Discharge Plan discussed with: Patient     Transition of Care Barriers None        Physical Activity    On average, how many days per week do you engage in moderate to strenuous exercise (like a brisk walk)? 3 days     On average, how many minutes do you engage in exercise at this level? 20 min        Financial Resource Strain    How hard is it for you to pay for the very basics like food, housing, medical care, and heating? Not hard at all        Housing Stability    In the last 12 months, was there a time when you were not able to pay the mortgage or rent on time? No     At any time in the past 12 months, were you homeless or living in a shelter (including now)? No        Transportation Needs    Has the lack of transportation kept you from medical appointments, meetings, work or from getting things needed for daily living? No        Food Insecurity    Within the past 12 months, you worried that your food would  run out before you got the money to buy more. Never true     Within the past 12 months, the food you bought just didn't last and you didn't have money to get more. Never true        Stress    Do you feel stress - tense, restless, nervous, or anxious, or unable to sleep at night because your mind is troubled all the time - these days? Only a little        Social Isolation    How often do you feel lonely or isolated from those around you?  Never        Alcohol Use    Q1: How often do you have a drink containing alcohol? Never     Q2: How many drinks containing alcohol do you have on a typical day when you are drinking? Patient does not drink     Q3: How often do you have six or more drinks on one occasion? Never        Utilities    In the past 12 months has the electric, gas, oil, or water company threatened to shut off services in your home? No        Health Literacy    How often do you need to have someone help you when you read instructions, pamphlets, or other written material from your doctor or pharmacy? Rarely        OTHER    Name(s) of People in Home Jia Carrillo, son, [phone]                   SS spoke with pt at bedside. Pt lives at home with her children and plans to return to current living arrangements when medically ready for dc. Pt does not currently require dme and is not current with hh. SDOH complete. SS following for anticipated dc needs

## 2024-11-06 NOTE — ASSESSMENT & PLAN NOTE
Body mass index is 49.13 kg/m². Morbid obesity complicates all aspects of disease management from diagnostic modalities to treatment. Weight loss encouraged and health benefits explained to patient.

## 2024-11-06 NOTE — H&P
[facility] - Emergency Department  Hospital Medicine  History & Physical    Patient Name: Rosa Rutledge  MRN: [id number]  Patient Class: IP- Inpatient  Admission Date: 11/5/2024  Attending Physician: Shar Cortes MD   Primary Care Provider: Karis Primary Doctor         Patient information was obtained from patient, past medical records, and ER records.     Subjective:     Principal Problem:Sepsis    Chief Complaint:   Chief Complaint   Patient presents with    Loss of Consciousness    Abdominal Pain    Fever        HPI: Patient is a 43 yo F presents to [facility] with complaints of abdominal pain and painful urination. Patient was seen in the ED yesterday and was diagnosed with UTI and was sent home on keflex. Patient was seen in clinic today and reported worsening symptoms and was sent back to the ED.     Initial presenting vitals were  Bp 116/74, Spo2 97%, Temp 101.6. initial labs  WBC 14.59, H/H 10.6/34.8, Platelet 325, Na 135, K 2.6, Cl 102, Co2 23, BUN/Cr 10/0.85, lactic acid 2.2. patient was give 1 l NS bolus and started on zosyn in the ED.     This patient will be admitted to [facility] for continued medical management.     Past Medical History:   Diagnosis Date    Asthma     COVID     Hypertension     Kidney stone        Past Surgical History:   Procedure Laterality Date    ADENOIDECTOMY      BREAST SURGERY Bilateral     reduction    CHOLECYSTECTOMY  11/12/2020    CYSTOURETEROSCOPY, WITH HOLMIUM LASER LITHOTRIPSY OF URETERAL CALCULUS AND STENT INSERTION Right 5/16/2024    Procedure: CYSTOURETEROSCOPY WITH HOLMIUM LASER LITHOTRIPSY OF URETERAL CALCULUS, STENT INSERTION AND INDICATED PROCEDURES;  Surgeon: Luc Howard Jr., MD;  Location: [facility];  Service: Urology;  Laterality: Right;    HYSTERECTOMY  2016    Full    REDUCTION OF BOTH BREASTS  01/09/2020    TONSILLECTOMY      TOTAL REDUCTION MAMMOPLASTY      TUBAL LIGATION         Review of patient's allergies  indicates:  No Known Allergies    Current Facility-Administered Medications on File Prior to Encounter   Medication    [COMPLETED] ketorolac injection 60 mg     Current Outpatient Medications on File Prior to Encounter   Medication Sig    azaTHIOprine (IMURAN) 100 mg tablet Take 1 tablet (100 mg total) by mouth once daily.    budesonide 180mcg (PULMICORT FLEXHALER) 180 mcg/actuation AePB Inhale 2 puffs into the lungs every 4 to 6 hours as needed.    cephALEXin (KEFLEX) 500 MG capsule Take 1 capsule (500 mg total) by mouth every 12 (twelve) hours. for 7 days    ergocalciferol (ERGOCALCIFEROL) 50,000 unit Cap TAKE 1 CAPSULE BY MOUTH TWICE A WEEK    fluconazole (DIFLUCAN) 150 MG Tab Take 1 tablet (150 mg total) by mouth once daily. for 1 day    LINZESS 145 mcg Cap capsule Take 2 capsules (290 mcg total) by mouth before breakfast.    losartan (COZAAR) 50 MG tablet Take 1 tablet (50 mg total) by mouth once daily.    magnesium oxide 400 mg magnesium Tab Take 1 tablet by mouth 2 (two) times daily.    neomycin-polymyxin-hydrocortisone (CORTISPORIN) 3.5-10,000-1 mg/mL-unit/mL-% otic suspension Place 3 drops into the left ear 2 (two) times a day.    potassium chloride (KLOR-CON) 10 MEQ TbSR Take 1 tablet (10 mEq total) by mouth once daily.    potassium chloride SA (K-DUR,KLOR-CON) 20 MEQ tablet Take 1 tablet (20 mEq total) by mouth 2 (two) times daily. for 3 days    adalimumab (HUMIRA PEN) PnKt injection Inject 1 pen  (40 mg total) into the skin every 14 (fourteen) days. (Patient not taking: Reported on 11/5/2024)    adalimumab (HUMIRA,CF, PEN) 80 mg/0.8 mL PnKt 160 mg (given over 1 or 2 days), then 80 mg 2 weeks later on day 15. (Patient not taking: Reported on 11/5/2024)    NURTEC 75 mg odt DISSOLVE 1 TABLET ON THE TONGUE AT ONSET HEADACHE    [DISCONTINUED] amoxicillin (AMOXIL) 500 MG capsule Take 1 capsule (500 mg total) by mouth 2 (two) times a day. (Patient not taking: Reported on 11/5/2024)    [DISCONTINUED] fluconazole  (DIFLUCAN) 150 MG Tab Take 1 tablet (150 mg total) by mouth once daily. for 1 day    [DISCONTINUED] gabapentin (NEURONTIN) 300 MG capsule Take 300 mg by mouth 3 (three) times daily. (Patient not taking: Reported on 11/5/2024)    [DISCONTINUED] losartan (COZAAR) 50 MG tablet Take 1 tablet (50 mg total) by mouth once daily.    [DISCONTINUED] promethazine (PHENERGAN) 25 MG tablet TAKE 1 TABLET BY MOUTH EVERY 8 HOURS AS NEEDED FOR MIGRAINE    [DISCONTINUED] semaglutide, weight loss, (WEGOVY) 1 mg/0.5 mL PnIj Inject 1 mg into the skin every 7 days. (Patient not taking: Reported on 11/5/2024)     Family History       Problem Relation (Age of Onset)    Asthma Maternal Grandmother, Sister    Diabetes Mother, Maternal Grandmother    Hypertension Mother, Maternal Grandmother, Sister          Tobacco Use    Smoking status: Never    Smokeless tobacco: Never   Substance and Sexual Activity    Alcohol use: Never    Drug use: Never    Sexual activity: Yes     Partners: Male     Review of Systems   Constitutional:  Positive for chills and fever. Negative for fatigue.   Respiratory:  Negative for cough.    Cardiovascular:  Negative for chest pain.   Gastrointestinal:  Positive for abdominal pain and diarrhea. Negative for nausea and vomiting.   Genitourinary:  Positive for difficulty urinating, dysuria, flank pain, frequency and urgency. Negative for hematuria.   Neurological:  Positive for weakness. Negative for headaches.     Objective:     Vital Signs (Most Recent):  Temp: 98.6 °F (37 °C) (11/05/24 1936)  Pulse: 104 (11/05/24 1936)  Resp: 19 (11/05/24 1936)  BP: (!) 96/59 (11/05/24 1936)  SpO2: 95 % (11/05/24 1936) Vital Signs (24h Range):  Temp:  [98.6 °F (37 °C)-101.6 °F (38.7 °C)] 98.6 °F (37 °C)  Pulse:  [104-122] 104  Resp:  [17-20] 19  SpO2:  [95 %-97 %] 95 %  BP: ()/(59-74) 96/59     Weight: 117.9 kg (260 lb)  Body mass index is 49.13 kg/m².     Physical Exam  Vitals and nursing note reviewed.   Constitutional:        Appearance: Normal appearance.   HENT:      Head: Normocephalic.      Right Ear: External ear normal.      Left Ear: External ear normal.      Nose: Nose normal.      Mouth/Throat:      Pharynx: Oropharynx is clear.   Eyes:      Conjunctiva/sclera: Conjunctivae normal.   Cardiovascular:      Rate and Rhythm: Normal rate and regular rhythm.      Pulses: Normal pulses.      Heart sounds: Normal heart sounds.   Pulmonary:      Effort: Pulmonary effort is normal.      Breath sounds: Normal breath sounds.   Abdominal:      General: Abdomen is flat. There is no distension.      Palpations: Abdomen is soft. There is no mass.      Tenderness: There is no abdominal tenderness.      Hernia: No hernia is present.   Musculoskeletal:      Right lower leg: No edema.      Left lower leg: No edema.   Skin:     General: Skin is warm.   Neurological:      Mental Status: She is alert and oriented to person, place, and time.   Psychiatric:         Mood and Affect: Mood normal.         Behavior: Behavior normal.         Thought Content: Thought content normal.         Judgment: Judgment normal.                Significant Labs: All pertinent labs within the past 24 hours have been reviewed.    Significant Imaging: I have reviewed all pertinent imaging results/findings within the past 24 hours.  Assessment/Plan:     * Sepsis  This patient does have evidence of infective focus  My overall impression is sepsis.  Source: Urinary Tract  Antibiotics given-   Antibiotics (72h ago, onward)      Start     Stop Route Frequency Ordered    11/06/24 0900  cefTRIAXone injection 1 g  ( Urinary Tract Infection (UTI))         11/11/24 0859 IV Every 24 hours (non-standard times) 11/05/24 2035 11/05/24 1930  piperacillin-tazobactam (ZOSYN) 4.5 g in D5W 100 mL IVPB (MB+)         11/06/24 0729 IV ED 1 Time 11/05/24 1920          Latest lactate reviewed-  Recent Labs   Lab 11/05/24  1750   LACTATE 2.2*         Fluid challenge Ideal Body Weight- The  patient's ideal body weight is Ideal body weight: 47.8 kg (105 lb 6.1 oz) which will be used to calculate fluid bolus of 30 ml/kg for treatment of septic shock.      Post- resuscitation assessment Yes Perfusion exam was performed within 6 hours of septic shock presentation after bolus shows Adequate tissue perfusion assessed by non-invasive monitoring       Will Not start Pressors-   Source control achieved by: rocephin    Crohn's disease of small intestine with other complication  Patient was recently diagnosed with crohns disease, reporting lose/runny bowel movements will consult GI. Enteric pathogen and C.diff panel pending.       Hypertension  Patients blood pressure range in the last 24 hours was: BP  Min: 91/57  Max: 116/74.The patient's inpatient anti-hypertensive regimen is listed below:  Current Antihypertensives       Plan  - BP is controlled, no changes needed to their regimen  - holding home losartan due to hypotension    Hypokalemia  Patient's most recent potassium results are listed below.   Recent Labs     11/04/24  0755 11/05/24  1750   K 2.7* 2.6*     Plan  - Replete potassium per protocol  - Monitor potassium Every 8 hours  - Patient's hypokalemia is worsening. Will adjust treatment as follows: add PO potassium BID  -     Class 3 severe obesity with body mass index (BMI) of 45.0 to 49.9 in adult  Body mass index is 49.13 kg/m². Morbid obesity complicates all aspects of disease management from diagnostic modalities to treatment. Weight loss encouraged and health benefits explained to patient.         UTI (urinary tract infection)  Suspected UTI based on previous UA, will start on rocephin, urine culture pending.         VTE Risk Mitigation (From admission, onward)           Ordered     IP VTE HIGH RISK PATIENT  Once         11/05/24 2035     Place sequential compression device  Until discontinued         11/05/24 2035                                    Vikram Rincon DO  Department [facility]  Medicine  [facility] - Emergency Department

## 2024-11-06 NOTE — HPI
Patient is a 43 yo F presents to [facility] with complaints of abdominal pain and painful urination. Patient was seen in the ED yesterday and was diagnosed with UTI and was sent home on keflex. Patient was seen in clinic today and reported worsening symptoms and was sent back to the ED.     Initial presenting vitals were  Bp 116/74, Spo2 97%, Temp 101.6. initial labs  WBC 14.59, H/H 10.6/34.8, Platelet 325, Na 135, K 2.6, Cl 102, Co2 23, BUN/Cr 10/0.85, lactic acid 2.2. patient was give 1 l NS bolus and started on zosyn in the ED.     This patient will be admitted to [facility] for continued medical management.

## 2024-11-06 NOTE — PLAN OF CARE
Problem: Adult Inpatient Plan of Care  Goal: Plan of Care Review  Outcome: Progressing  Flowsheets (Taken 11/5/2024 2238)  Plan of Care Reviewed With: patient  Goal: Optimal Comfort and Wellbeing  Outcome: Progressing  Intervention: Monitor Pain and Promote Comfort  Flowsheets (Taken 11/5/2024 2238)  Pain Management Interventions:   pain management plan reviewed with patient/caregiver   pillow support provided   position adjusted   relaxation techniques promoted   quiet environment facilitated  Intervention: Provide Person-Centered Care  Flowsheets (Taken 11/5/2024 2238)  Trust Relationship/Rapport:   care explained   choices provided   emotional support provided   empathic listening provided   questions answered   questions encouraged   reassurance provided   thoughts/feelings acknowledged     Problem: Sepsis/Septic Shock  Goal: Blood Glucose Level Within Targeted Range  Outcome: Progressing  Intervention: Optimize Glycemic Control  Flowsheets (Taken 11/5/2024 2238)  Glycemic Management:   blood glucose monitored   oral hydration promoted   supplemental insulin given     Problem: Adult Inpatient Plan of Care  Goal: Plan of Care Review  Outcome: Progressing  Flowsheets (Taken 11/5/2024 2238)  Plan of Care Reviewed With: patient  Goal: Optimal Comfort and Wellbeing  Outcome: Progressing  Intervention: Monitor Pain and Promote Comfort  Flowsheets (Taken 11/5/2024 2238)  Pain Management Interventions:   pain management plan reviewed with patient/caregiver   pillow support provided   position adjusted   relaxation techniques promoted   quiet environment facilitated  Intervention: Provide Person-Centered Care  Flowsheets (Taken 11/5/2024 2238)  Trust Relationship/Rapport:   care explained   choices provided   emotional support provided   empathic listening provided   questions answered   questions encouraged   reassurance provided   thoughts/feelings acknowledged

## 2024-11-06 NOTE — ASSESSMENT & PLAN NOTE
This patient does have evidence of infective focus  My overall impression is sepsis.  Source: Urinary Tract  Antibiotics given-   Antibiotics (72h ago, onward)      Start     Stop Route Frequency Ordered    11/06/24 0900  cefTRIAXone injection 1 g  ( Urinary Tract Infection (UTI))         11/11/24 0859 IV Every 24 hours (non-standard times) 11/05/24 2035 11/05/24 1930  piperacillin-tazobactam (ZOSYN) 4.5 g in D5W 100 mL IVPB (MB+)         11/06/24 0729 IV ED 1 Time 11/05/24 1920          Latest lactate reviewed-  Recent Labs   Lab 11/05/24  1750   LACTATE 2.2*         Fluid challenge Ideal Body Weight- The patient's ideal body weight is Ideal body weight: 47.8 kg (105 lb 6.1 oz) which will be used to calculate fluid bolus of 30 ml/kg for treatment of septic shock.      Post- resuscitation assessment Yes Perfusion exam was performed within 6 hours of septic shock presentation after bolus shows Adequate tissue perfusion assessed by non-invasive monitoring       Will Not start Pressors-   Source control achieved by: rocephin

## 2024-11-06 NOTE — ED PROVIDER NOTES
Encounter Date: 11/5/2024       History     Chief Complaint   Patient presents with    Loss of Consciousness    Abdominal Pain    Fever     Patient presents to the ER with a complaint of abdominal pain nausea vomiting diarrhea and fever.  Patient was reports she was seen in the ER yesterday and was diagnosed with UTI and sent home with Keflex.  She states her potassium was also low and she was given IV fluids and potassium while she was in the ER.  Patient reports her symptoms did improve when she was discharge.  She states throughout the night her symptoms to be in 2 worsened and she was followed up with the primary care provider today who referred her to the ER for evaluation and possible admission.    The history is provided by the patient and a friend. No  was used.     Review of patient's allergies indicates:  No Known Allergies  Past Medical History:   Diagnosis Date    Asthma     COVID     Hypertension     Kidney stone      Past Surgical History:   Procedure Laterality Date    ADENOIDECTOMY      BREAST SURGERY Bilateral     reduction    CHOLECYSTECTOMY  11/12/2020    CYSTOURETEROSCOPY, WITH HOLMIUM LASER LITHOTRIPSY OF URETERAL CALCULUS AND STENT INSERTION Right 5/16/2024    Procedure: CYSTOURETEROSCOPY WITH HOLMIUM LASER LITHOTRIPSY OF URETERAL CALCULUS, STENT INSERTION AND INDICATED PROCEDURES;  Surgeon: Luc Howard Jr., MD;  Location: [facility];  Service: Urology;  Laterality: Right;    HYSTERECTOMY  2016    Full    REDUCTION OF BOTH BREASTS  01/09/2020    TONSILLECTOMY      TOTAL REDUCTION MAMMOPLASTY      TUBAL LIGATION       Family History   Problem Relation Name Age of Onset    Diabetes Mother      Hypertension Mother      Diabetes Maternal Grandmother      Hypertension Maternal Grandmother      Asthma Maternal Grandmother      Asthma Sister      Hypertension Sister       Social History     Tobacco Use    Smoking status: Never    Smokeless tobacco: Never   Substance Use  Topics    Alcohol use: Never    Drug use: Never     Review of Systems   Constitutional:  Positive for activity change, appetite change, chills, fatigue and fever.   Gastrointestinal:  Positive for abdominal distention, abdominal pain, diarrhea, nausea and vomiting.   Neurological:  Positive for dizziness and weakness. Syncope: near syncope.  All other systems reviewed and are negative.      Physical Exam     Initial Vitals [11/05/24 1658]   BP Pulse Resp Temp SpO2   116/74 (!) 121 20 (!) 101.6 °F (38.7 °C) 97 %      MAP       --         Physical Exam    Nursing note and vitals reviewed.  Constitutional: Vital signs are normal. She appears well-developed and well-nourished. She is cooperative. She has a sickly appearance.   HENT:   Head: Normocephalic.   Nose: Nose normal. Mouth/Throat: Oropharynx is clear and moist.   Eyes: Conjunctivae and EOM are normal.   Neck: Neck supple.   Normal range of motion.  Cardiovascular:  Normal rate, normal heart sounds and intact distal pulses.           Abdominal: Abdomen is protuberant. Bowel sounds are normal. She exhibits distension. There is abdominal tenderness (Generalized) in the right lower quadrant and periumbilical area.       Musculoskeletal:         General: Normal range of motion.      Cervical back: Normal range of motion and neck supple.     Neurological: She is alert and oriented to person, place, and time. She has normal strength. GCS score is 15. GCS eye subscore is 4. GCS verbal subscore is 5. GCS motor subscore is 6.   Skin: Skin is warm and dry. Capillary refill takes less than 2 seconds.   Psychiatric: She has a normal mood and affect. Thought content normal.         Medical Screening Exam   See Full Note    ED Course   Procedures  Labs Reviewed   COMPREHENSIVE METABOLIC PANEL - Abnormal       Result Value    Sodium 135 (*)     Potassium 2.6 (*)     Chloride 102      CO2 23      Anion Gap 13      Glucose 98      BUN 10      Creatinine 0.85      BUN/Creatinine  Ratio 12      Calcium 8.5      Total Protein 7.1      Albumin 2.7 (*)     Globulin 4.4 (*)     A/G Ratio 0.6      Bilirubin, Total 1.2      Alk Phos 158 (*)     ALT 52      AST 40 (*)     eGFR 86     CBC WITH DIFFERENTIAL - Abnormal    WBC 14.59 (*)     RBC 4.27      Hemoglobin 10.6 (*)     Hematocrit 34.8 (*)     MCV 81.5      MCH 24.8 (*)     MCHC 30.5 (*)     RDW 14.5      Platelet Count 325      MPV 11.6      Neutrophils % 94.3 (*)     Lymphocytes % 1.6 (*)     Monocytes % 1.6 (*)     Eosinophils % 1.4      Basophils % 0.4      Immature Granulocytes % 0.7 (*)     nRBC, Auto 0.0      Neutrophils, Abs 13.75 (*)     Lymphocytes, Absolute 0.23 (*)     Monocytes, Absolute 0.24      Eosinophils, Absolute 0.21      Basophils, Absolute 0.06      Immature Granulocytes, Absolute 0.10 (*)     nRBC, Absolute 0.00      Diff Type Auto     LACTIC ACID, PLASMA - Abnormal    Lactic Acid 2.2 (*)    INFLUENZA A & B BY MOLECULAR - Normal    INFLUENZA A MOLECULAR Negative      INFLUENZA B MOLECULAR  Negative     MAGNESIUM - Normal    Magnesium 2.1     SARS-COV-2 RNA AMPLIFICATION, QUAL - Normal    SARS COV-2 Molecular Negative      Narrative:     Negative SARS-CoV results should not be used as the sole basis for treatment or patient management decisions; negative results should be considered in the context of a patient's recent exposures, history and the presene of clinical signs and symptoms consistent with COVID-19.  Negative results should be treated as presumptive and confirmed by molecular assay, if necessary for patient management.   CLOSTRIDIOIDES DIFFICILE TOXIN/ANTIGEN WITH REFLEX TO PCR   ENTERIC PATHOGEN PANEL   CBC W/ AUTO DIFFERENTIAL    Narrative:     The following orders were created for panel order CBC auto differential.  Procedure                               Abnormality         Status                     ---------                               -----------         ------                     CBC with  Differential[[id number]]       Abnormal            Final result                 Please view results for these tests on the individual orders.   LACTIC ACID, PLASMA          Imaging Results    None          Medications   potassium chloride SA CR tablet 40 mEq (has no administration in time range)   electrolytes-dextrose (Pedialyte) oral solution 600 mL (has no administration in time range)   piperacillin-tazobactam (ZOSYN) 4.5 g in D5W 100 mL IVPB (MB+) (has no administration in time range)   sodium chloride 0.9% bolus 1,000 mL 1,000 mL (0 mLs Intravenous Stopped 11/5/24 1922)   morphine injection 4 mg (4 mg Intravenous Given 11/5/24 1827)   ondansetron injection 4 mg (4 mg Intravenous Given 11/5/24 1826)   ibuprofen tablet 800 mg (800 mg Oral Given 11/5/24 1815)   fluconazole (DIFLUCAN) IVPB 200 mg (200 mg Intravenous New Bag 11/5/24 1924)     Medical Decision Making  Patient presents to the ER with a complaint of abdominal pain nausea vomiting diarrhea and fever.  Patient was reports she was seen in the ER yesterday and was diagnosed with UTI and sent home with Keflex.  She states her potassium was also low and she was given IV fluids and potassium while she was in the ER.  Patient reports her symptoms did improve when she was discharge.  She states throughout the night her symptoms to be in 2 worsened and she was followed up with the primary care provider today who referred her to the ER for evaluation and possible admission.      Amount and/or Complexity of Data Reviewed  Labs: ordered.  Discussion of management or test interpretation with external provider(s): Initiate IV, collect lab work  1 L normal saline bolus  Potassium 40 mEq p.o. to treat hypokalemia  Morphine 4 mg IV to treat pain  Zofran 4 mg IV to treat nausea  Ibuprofen 800 mg p.o. to treat fever  Discussed patient with Dr. Pittman who also evaluated the patient in his assisting with the medical management of this patient.  Diflucan 200 mg IV  piglui Garner in ER and recommends admit to observation and GI consult for patient.  Dr Pittman agrees with plan of care.  Dr Cortes consulted for admission. Will admit to hospitalist service.     Risk  OTC drugs.  Prescription drug management.                                      Clinical Impression:   Final diagnoses:  [N39.0] Urinary tract infection without hematuria, site unspecified (Primary)  [R10.31] Right lower quadrant abdominal pain  [Z87.19] History of Crohn's disease  [R11.2, R19.7] Nausea vomiting and diarrhea        ED Disposition Condition    Observation Stable                Treva Gómez, FNP  11/05/24 2031

## 2024-11-06 NOTE — PROGRESS NOTES
[facility] - [address] [place] [address]  Wound Care    Patient Name:  Rosa Rutledge   MRN:  [id number]  Date: 11/6/2024  Diagnosis: Sepsis    History:     Past Medical History:   Diagnosis Date    Asthma     COVID     Hypertension     Kidney stone        Social History     Socioeconomic History    Marital status:     Number of children: 2   Occupational History    Occupation: [employer]      Employer: [employer]   Tobacco Use    Smoking status: Never    Smokeless tobacco: Never   Substance and Sexual Activity    Alcohol use: Never    Drug use: Never    Sexual activity: Yes     Partners: Male   Social History Narrative    Lives at home with  and second child (13)    No smoking in home , No smoking in home      Social Drivers of Health     Financial Resource Strain: Low Risk  (11/6/2024)    Overall Financial Resource Strain (CARDIA)     Difficulty of Paying Living Expenses: Not hard at all   Food Insecurity: No Food Insecurity (11/6/2024)    Hunger Vital Sign     Worried About Running Out of Food in the Last Year: Never true     Ran Out of Food in the Last Year: Never true   Transportation Needs: No Transportation Needs (11/6/2024)    TRANSPORTATION NEEDS     Transportation : No   Physical Activity: Insufficiently Active (11/6/2024)    Exercise Vital Sign     Days of Exercise per Week: 3 days     Minutes of Exercise per Session: 20 min   Stress: No Stress Concern Present (11/6/2024)    [employer] [place] [employer] - Occupational Stress Questionnaire     Feeling of Stress : Only a little   Housing Stability: Low Risk  (11/6/2024)    Housing Stability Vital Sign     Unable to Pay for Housing in the Last Year: No     Homeless in the Last Year: No       Precautions:     Allergies as of 11/05/2024    (No Known Allergies)       WOC Assessment Details/Treatment     Narrative: Seen patient for initial preventative skin care measures.  Patient ambulates.  No foam borders, open wounds, or redness noted to  bilateral heels and sacral.  Consult wound care of any findings.      11/06/2024

## 2024-11-06 NOTE — ASSESSMENT & PLAN NOTE
Patients blood pressure range in the last 24 hours was: BP  Min: 91/57  Max: 116/74.The patient's inpatient anti-hypertensive regimen is listed below:  Current Antihypertensives       Plan  - BP is controlled, no changes needed to their regimen  - holding home losartan due to hypotension

## 2024-11-06 NOTE — PHARMACY MED REC
"Admission Medication History     The home medication history was taken by Serena Hernandez.    You may go to "Admission" then "Reconcile Home Medications" tabs to review and/or act upon these items.     The home medication list has been updated by the Pharmacy department.   Please read ALL comments highlighted in yellow.   Please address this information as you see fit.    Feel free to contact us if you have any questions or require assistance.      Patient reports no longer taking the following medication(s). The medication(s) listed below were removed from the home medication list. Please reorder if appropriate:  Amoxicillin 500 mg  Gabapentin 300 mg  Promethazine 25 mg  Semaglutide 1 mg/0.5 mg    Medications listed below were obtained from: Patient/family and Analytic software- [employer]  (Not in a hospital admission)        Current Outpatient Medications on File Prior to Encounter   Medication Sig Dispense Refill Last Dose/Taking    azaTHIOprine (IMURAN) 100 mg tablet Take 1 tablet (100 mg total) by mouth once daily. 90 tablet 3 11/4/2024    budesonide 180mcg (PULMICORT FLEXHALER) 180 mcg/actuation AePB Inhale 2 puffs into the lungs every 4 to 6 hours as needed.   11/4/2024    cephALEXin (KEFLEX) 500 MG capsule Take 1 capsule (500 mg total) by mouth every 12 (twelve) hours. for 7 days 14 capsule 0 11/5/2024    ergocalciferol (ERGOCALCIFEROL) 50,000 unit Cap TAKE 1 CAPSULE BY MOUTH TWICE A WEEK 8 capsule 0 Past Week    fluconazole (DIFLUCAN) 150 MG Tab Take 1 tablet (150 mg total) by mouth once daily. for 1 day 1 tablet 0 11/5/2024    LINZESS 145 mcg Cap capsule Take 2 capsules (290 mcg total) by mouth before breakfast. 180 capsule 3 11/4/2024    losartan (COZAAR) 50 MG tablet Take 1 tablet (50 mg total) by mouth once daily. 90 tablet 3 11/4/2024    magnesium oxide 400 mg magnesium Tab Take 1 tablet by mouth 2 (two) times daily. 14 tablet 0 11/5/2024    neomycin-polymyxin-hydrocortisone (CORTISPORIN) " 3.5-10,000-1 mg/mL-unit/mL-% otic suspension Place 3 drops into the left ear 2 (two) times a day. 10 mL 0 Past Week    potassium chloride (KLOR-CON) 10 MEQ TbSR Take 1 tablet (10 mEq total) by mouth once daily. 90 tablet 3 Past Week    potassium chloride SA (K-DUR,KLOR-CON) 20 MEQ tablet Take 1 tablet (20 mEq total) by mouth 2 (two) times daily. for 3 days 6 tablet 0 11/5/2024    adalimumab (HUMIRA PEN) PnKt injection Inject 1 pen  (40 mg total) into the skin every 14 (fourteen) days. (Patient not taking: Reported on 11/5/2024) 2 pen 11     adalimumab (HUMIRA,CF, PEN) 80 mg/0.8 mL PnKt 160 mg (given over 1 or 2 days), then 80 mg 2 weeks later on day 15. (Patient not taking: Reported on 11/5/2024) 3 pen 0     NURTEC 75 mg odt DISSOLVE 1 TABLET ON THE TONGUE AT ONSET HEADACHE 16 tablet 2     [DISCONTINUED] amoxicillin (AMOXIL) 500 MG capsule Take 1 capsule (500 mg total) by mouth 2 (two) times a day. (Patient not taking: Reported on 11/5/2024) 28 capsule 0     [DISCONTINUED] fluconazole (DIFLUCAN) 150 MG Tab Take 1 tablet (150 mg total) by mouth once daily. for 1 day 1 tablet 0     [DISCONTINUED] gabapentin (NEURONTIN) 300 MG capsule Take 300 mg by mouth 3 (three) times daily. (Patient not taking: Reported on 11/5/2024)       [DISCONTINUED] losartan (COZAAR) 50 MG tablet Take 1 tablet (50 mg total) by mouth once daily. 90 tablet 3     [DISCONTINUED] promethazine (PHENERGAN) 25 MG tablet TAKE 1 TABLET BY MOUTH EVERY 8 HOURS AS NEEDED FOR MIGRAINE 20 tablet 2     [DISCONTINUED] semaglutide, weight loss, (WEGOVY) 1 mg/0.5 mL PnIj Inject 1 mg into the skin every 7 days. (Patient not taking: Reported on 11/5/2024) 2 mL 0          Potential issues to be addressed PRIOR TO DISCHARGE  Patient reported not taking the following medications: (Humira). These medications remain on the home medication list. Please address accordingly.     Serena Hernandez  Medication Access Specialist  EXT. [phone]    .

## 2024-11-07 LAB
25(OH)D3 SERPL-MCNC: 57.8 NG/ML
ALBUMIN SERPL BCP-MCNC: 2.1 G/DL (ref 3.5–5)
ALBUMIN/GLOB SERPL: 0.7 {RATIO}
ALP SERPL-CCNC: 135 U/L (ref 39–100)
ALT SERPL W P-5'-P-CCNC: 33 U/L (ref 13–56)
ANION GAP SERPL CALCULATED.3IONS-SCNC: 8 MMOL/L (ref 7–16)
ANISOCYTOSIS BLD QL SMEAR: ABNORMAL
AST SERPL W P-5'-P-CCNC: 22 U/L (ref 15–37)
BASOPHILS # BLD AUTO: 0.09 K/UL (ref 0–0.2)
BASOPHILS NFR BLD AUTO: 0.5 % (ref 0–1)
BILIRUB SERPL-MCNC: 3.3 MG/DL (ref ?–1.2)
BUN SERPL-MCNC: 7 MG/DL (ref 7–18)
BUN/CREAT SERPL: 9 (ref 6–20)
CALCIUM SERPL-MCNC: 8.6 MG/DL (ref 8.5–10.1)
CHLORIDE SERPL-SCNC: 109 MMOL/L (ref 98–107)
CO2 SERPL-SCNC: 26 MMOL/L (ref 21–32)
CREAT SERPL-MCNC: 0.79 MG/DL (ref 0.55–1.02)
DIFFERENTIAL METHOD BLD: ABNORMAL
EGFR (NO RACE VARIABLE) (RUSH/TITUS): 94 ML/MIN/1.73M2
EOSINOPHIL # BLD AUTO: 0.32 K/UL (ref 0–0.5)
EOSINOPHIL NFR BLD AUTO: 1.9 % (ref 1–4)
EOSINOPHIL NFR BLD MANUAL: 2 % (ref 1–4)
ERYTHROCYTE [DISTWIDTH] IN BLOOD BY AUTOMATED COUNT: 14.6 % (ref 11.5–14.5)
FOLATE SERPL-MCNC: 7.1 NG/ML (ref 3.1–17.5)
GLOBULIN SER-MCNC: 3.1 G/DL (ref 2–4)
GLUCOSE SERPL-MCNC: 100 MG/DL (ref 74–106)
HAPTOGLOB SERPL NEPH-MCNC: 294 MG/DL (ref 30–200)
HCT VFR BLD AUTO: 33.1 % (ref 38–47)
HGB BLD-MCNC: 10.3 G/DL (ref 12–16)
HYPOCHROMIA BLD QL SMEAR: ABNORMAL
IGG SERPL-MCNC: 1110 MG/DL (ref 767–1590)
IMM GRANULOCYTES # BLD AUTO: 0.28 K/UL (ref 0–0.04)
IMM GRANULOCYTES NFR BLD: 1.6 % (ref 0–0.4)
LYMPHOCYTES # BLD AUTO: 0.78 K/UL (ref 1–4.8)
LYMPHOCYTES NFR BLD AUTO: 4.5 % (ref 27–41)
LYMPHOCYTES NFR BLD MANUAL: 5 % (ref 27–41)
MAGNESIUM SERPL-MCNC: 2.3 MG/DL (ref 1.7–2.3)
MCH RBC QN AUTO: 24.9 PG (ref 27–31)
MCHC RBC AUTO-ENTMCNC: 31.1 G/DL (ref 32–36)
MCV RBC AUTO: 80 FL (ref 80–96)
MONOCYTES # BLD AUTO: 0.2 K/UL (ref 0–0.8)
MONOCYTES NFR BLD AUTO: 1.2 % (ref 2–6)
MPC BLD CALC-MCNC: 11.7 FL (ref 9.4–12.4)
NEUTROPHILS # BLD AUTO: 15.51 K/UL (ref 1.8–7.7)
NEUTROPHILS NFR BLD AUTO: 90.3 % (ref 53–65)
NEUTS BAND NFR BLD MANUAL: 4 % (ref 1–5)
NEUTS SEG NFR BLD MANUAL: 89 % (ref 50–62)
NRBC # BLD AUTO: 0 X10E3/UL
NRBC, AUTO (.00): 0 %
OVALOCYTES BLD QL SMEAR: ABNORMAL
PHOSPHATE SERPL-MCNC: 1.5 MG/DL (ref 2.5–4.5)
PLATELET # BLD AUTO: 337 K/UL (ref 150–400)
PLATELET MORPHOLOGY: ABNORMAL
POTASSIUM SERPL-SCNC: 4.1 MMOL/L (ref 3.5–5.1)
PROT SERPL-MCNC: 5.2 G/DL (ref 6.4–8.2)
RBC # BLD AUTO: 4.14 M/UL (ref 4.2–5.4)
RHEUMATOID FACT SER NEPH-ACNC: NEGATIVE [IU]/ML
SODIUM SERPL-SCNC: 139 MMOL/L (ref 136–145)
T4 SERPL-MCNC: 11.4 ΜG/DL (ref 4.8–13.9)
TSH SERPL DL<=0.005 MIU/L-ACNC: 1.52 UIU/ML (ref 0.36–3.74)
VIT B12 SERPL-MCNC: 1285 PG/ML (ref 193–986)
WBC # BLD AUTO: 17.18 K/UL (ref 4.5–11)

## 2024-11-07 PROCEDURE — 86038 ANTINUCLEAR ANTIBODIES: CPT | Performed by: HOSPITALIST

## 2024-11-07 PROCEDURE — 25000003 PHARM REV CODE 250

## 2024-11-07 PROCEDURE — 84100 ASSAY OF PHOSPHORUS: CPT

## 2024-11-07 PROCEDURE — 80053 COMPREHEN METABOLIC PANEL: CPT

## 2024-11-07 PROCEDURE — 25000003 PHARM REV CODE 250: Performed by: HOSPITALIST

## 2024-11-07 PROCEDURE — 36415 COLL VENOUS BLD VENIPUNCTURE: CPT

## 2024-11-07 PROCEDURE — 63700000 PHARM REV CODE 250 ALT 637 W/O HCPCS

## 2024-11-07 PROCEDURE — 86015 ACTIN ANTIBODY EACH: CPT | Mod: 90 | Performed by: HOSPITALIST

## 2024-11-07 PROCEDURE — 84443 ASSAY THYROID STIM HORMONE: CPT | Performed by: HOSPITALIST

## 2024-11-07 PROCEDURE — 86235 NUCLEAR ANTIGEN ANTIBODY: CPT | Performed by: HOSPITALIST

## 2024-11-07 PROCEDURE — 83010 ASSAY OF HAPTOGLOBIN QUANT: CPT | Performed by: HOSPITALIST

## 2024-11-07 PROCEDURE — 86381 MITOCHONDRIAL ANTIBODY EACH: CPT | Performed by: HOSPITALIST

## 2024-11-07 PROCEDURE — 86430 RHEUMATOID FACTOR TEST QUAL: CPT | Performed by: HOSPITALIST

## 2024-11-07 PROCEDURE — 63600175 PHARM REV CODE 636 W HCPCS

## 2024-11-07 PROCEDURE — 82607 VITAMIN B-12: CPT | Performed by: HOSPITALIST

## 2024-11-07 PROCEDURE — 94761 N-INVAS EAR/PLS OXIMETRY MLT: CPT

## 2024-11-07 PROCEDURE — 82784 ASSAY IGA/IGD/IGG/IGM EACH: CPT | Performed by: HOSPITALIST

## 2024-11-07 PROCEDURE — 83735 ASSAY OF MAGNESIUM: CPT

## 2024-11-07 PROCEDURE — 86225 DNA ANTIBODY NATIVE: CPT | Performed by: HOSPITALIST

## 2024-11-07 PROCEDURE — 25000003 PHARM REV CODE 250: Performed by: NURSE PRACTITIONER

## 2024-11-07 PROCEDURE — 36415 COLL VENOUS BLD VENIPUNCTURE: CPT | Performed by: HOSPITALIST

## 2024-11-07 PROCEDURE — 85025 COMPLETE CBC W/AUTO DIFF WBC: CPT

## 2024-11-07 PROCEDURE — 84436 ASSAY OF TOTAL THYROXINE: CPT | Performed by: HOSPITALIST

## 2024-11-07 PROCEDURE — 99232 SBSQ HOSP IP/OBS MODERATE 35: CPT | Mod: ,,, | Performed by: INTERNAL MEDICINE

## 2024-11-07 PROCEDURE — 11000001 HC ACUTE MED/SURG PRIVATE ROOM

## 2024-11-07 PROCEDURE — 99232 SBSQ HOSP IP/OBS MODERATE 35: CPT | Mod: ,,, | Performed by: HOSPITALIST

## 2024-11-07 PROCEDURE — 86376 MICROSOMAL ANTIBODY EACH: CPT | Mod: 90 | Performed by: HOSPITALIST

## 2024-11-07 PROCEDURE — 82306 VITAMIN D 25 HYDROXY: CPT | Performed by: HOSPITALIST

## 2024-11-07 RX ORDER — HYDROCODONE BITARTRATE AND ACETAMINOPHEN 5; 325 MG/1; MG/1
1 TABLET ORAL EVERY 4 HOURS PRN
Status: DISCONTINUED | OUTPATIENT
Start: 2024-11-07 | End: 2024-11-08 | Stop reason: HOSPADM

## 2024-11-07 RX ADMIN — AZATHIOPRINE 100 MG: 100 TABLET ORAL at 10:11

## 2024-11-07 RX ADMIN — Medication 600 ML: at 09:11

## 2024-11-07 RX ADMIN — Medication 600 ML: at 12:11

## 2024-11-07 RX ADMIN — Medication 600 ML: at 08:11

## 2024-11-07 RX ADMIN — CEFTRIAXONE SODIUM 1 G: 1 INJECTION, POWDER, FOR SOLUTION INTRAMUSCULAR; INTRAVENOUS at 09:11

## 2024-11-07 RX ADMIN — HYDROCODONE BITARTRATE AND ACETAMINOPHEN 1 TABLET: 5; 325 TABLET ORAL at 12:11

## 2024-11-07 RX ADMIN — Medication 600 ML: at 04:11

## 2024-11-07 RX ADMIN — POTASSIUM CHLORIDE 40 MEQ: 1500 TABLET, EXTENDED RELEASE ORAL at 09:11

## 2024-11-07 RX ADMIN — POTASSIUM CHLORIDE 40 MEQ: 1500 TABLET, EXTENDED RELEASE ORAL at 08:11

## 2024-11-07 RX ADMIN — Medication 600 ML: at 05:11

## 2024-11-07 NOTE — ASSESSMENT & PLAN NOTE
Patient was recently diagnosed with crohns disease, reporting lose/runny bowel movements will consult GI. Enteric pathogen and C.diff panel pending.     11/06 this may be etiology of symptoms

## 2024-11-07 NOTE — ASSESSMENT & PLAN NOTE
This patient does have evidence of infective focus  My overall impression is sepsis.  Source: Urinary Tract  Antibiotics given-   Antibiotics (72h ago, onward)    Start     Stop Route Frequency Ordered    11/06/24 0900  cefTRIAXone injection 1 g  ( Urinary Tract Infection (UTI))         11/11/24 0859 IV Every 24 hours (non-standard times) 11/05/24 2035        Latest lactate reviewed-  Recent Labs   Lab 11/05/24 2038   LACTATE 1.4           Fluid challenge Ideal Body Weight- The patient's ideal body weight is Ideal body weight: 50.1 kg (110 lb 7.2 oz) which will be used to calculate fluid bolus of 30 ml/kg for treatment of septic shock.      Post- resuscitation assessment Yes Perfusion exam was performed within 6 hours of septic shock presentation after bolus shows Adequate tissue perfusion assessed by non-invasive monitoring       Will Not start Pressors-   Source control achieved by: rocephin

## 2024-11-07 NOTE — PROGRESS NOTES
[facility] - [address] [place] [address] Medicine  Progress Note    Patient Name: Rosa Rutledge  MRN: [id number]  Patient Class: IP- Inpatient   Admission Date: 11/5/2024  Length of Stay: 1 days  Attending Physician: Johnnie Henderson MD  Primary Care Provider: Karis, Primary Doctor        Subjective:     Principal Problem:Sepsis        HPI:  Patient is a 43 yo F presents to [facility] with complaints of abdominal pain and painful urination. Patient was seen in the ED yesterday and was diagnosed with UTI and was sent home on keflex. Patient was seen in clinic today and reported worsening symptoms and was sent back to the ED.     Initial presenting vitals were  Bp 116/74, Spo2 97%, Temp 101.6. initial labs  WBC 14.59, H/H 10.6/34.8, Platelet 325, Na 135, K 2.6, Cl 102, Co2 23, BUN/Cr 10/0.85, lactic acid 2.2. patient was give 1 l NS bolus and started on zosyn in the ED.     This patient will be admitted to [facility] for continued medical management.     Overview/Hospital Course:  11/06 Records reviewed. 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 Some loose stools without blood or fever. Saw DR Padilla and had colonoscopy with bx and dx crohn's dz.  Placed on imuran waiting on Humira that is now approved. Noted 5 days prior ear ache. Seen PCP and given amoxcil. Following day onset diarrhea. Then started having intermittent rigt sided and pain. Came to ER 11/04 and treated for low K and MG and given IVFs. Discharged but returned to ER with return pain. Temp then 101.6. No chills. No resp complaints. No NV. No dysuria. GB removed in 2000. Also prior hysto with BSO. Some pain still today. Stool studies negative. Talked with Dr Gonsalez.     Interval History:     Review of Systems   Constitutional:  Positive for fever. Negative for appetite change and fatigue.   HENT:  Negative for congestion, hearing loss and trouble swallowing.     Respiratory:  Negative for chest tightness, shortness of breath and wheezing.    Cardiovascular:  Negative for chest pain and palpitations.   Gastrointestinal:  Positive for abdominal pain and diarrhea. Negative for constipation and nausea.   Genitourinary:  Negative for difficulty urinating and dysuria.   Musculoskeletal:  Negative for back pain and neck stiffness.   Skin:  Negative for pallor and rash.   Neurological:  Negative for dizziness, speech difficulty and headaches.   Psychiatric/Behavioral:  Negative for confusion and suicidal ideas.      Objective:     Vital Signs (Most Recent):  Temp: (!) 100.4 °F (38 °C) (11/06/24 2011)  Pulse: (!) 124 (11/06/24 1945)  Resp: 18 (11/06/24 1945)  BP: (!) 82/50 (11/06/24 1945)  SpO2: 99 % (11/06/24 1115) Vital Signs (24h Range):  Temp:  [97.4 °F (36.3 °C)-100.4 °F (38 °C)] 100.4 °F (38 °C)  Pulse:  [] 124  Resp:  [16-24] 18  SpO2:  [95 %-100 %] 99 %  BP: ()/(50-68) 82/50     Weight: 122.4 kg (269 lb 12.8 oz)  Body mass index is 49.35 kg/m².    Intake/Output Summary (Last 24 hours) at 11/6/2024 2321  Last data filed at 11/6/2024 2011  Gross per 24 hour   Intake 2640 ml   Output --   Net 2640 ml         Physical Exam  Vitals reviewed.   Constitutional:       General: She is awake. She is not in acute distress.     Appearance: She is well-developed. She is morbidly obese. She is not toxic-appearing.   HENT:      Head: Normocephalic.      Nose: Nose normal.      Mouth/Throat:      Pharynx: Oropharynx is clear.   Eyes:      Extraocular Movements: Extraocular movements intact.      Pupils: Pupils are equal, round, and reactive to light.   Neck:      Thyroid: No thyroid mass.      Vascular: No carotid bruit.   Cardiovascular:      Rate and Rhythm: Normal rate and regular rhythm.      Pulses: Normal pulses.      Heart sounds: Normal heart sounds. No murmur heard.  Pulmonary:      Effort: Pulmonary effort is normal.      Breath sounds: Normal breath sounds and air  entry. No wheezing.   Abdominal:      General: Bowel sounds are increased. There is no distension.      Palpations: Abdomen is soft.      Tenderness: There is no abdominal tenderness.   Musculoskeletal:         General: Normal range of motion.      Cervical back: Neck supple. No rigidity.   Skin:     General: Skin is warm.      Coloration: Skin is not jaundiced.      Findings: No lesion.   Neurological:      General: No focal deficit present.      Mental Status: She is alert and oriented to person, place, and time.      Cranial Nerves: No cranial nerve deficit.   Psychiatric:         Attention and Perception: Attention normal.         Mood and Affect: Mood normal.         Behavior: Behavior normal. Behavior is cooperative.         Thought Content: Thought content normal.         Cognition and Memory: Cognition normal.             Significant Labs: All pertinent labs within the past 24 hours have been reviewed.  BMP:   Recent Labs   Lab 11/06/24  0428   GLU 96      K 3.7   *   CO2 24   BUN 11   CREATININE 0.82   CALCIUM 8.1*   MG 2.0     CBC:   Recent Labs   Lab 11/05/24  1751 11/06/24  0428   WBC 14.59* 17.25*   HGB 10.6* 10.0*   HCT 34.8* 33.4*    311     CMP:   Recent Labs   Lab 11/05/24  1750 11/06/24  0428   * 140   K 2.6* 3.7    108*   CO2 23 24   GLU 98 96   BUN 10 11   CREATININE 0.85 0.82   CALCIUM 8.5 8.1*   PROT 7.1 5.4*   ALBUMIN 2.7* 2.3*   BILITOT 1.2 2.5*   ALKPHOS 158* 140*   AST 40* 28   ALT 52 40   ANIONGAP 13 12       Significant Imaging: I have reviewed all pertinent imaging results/findings within the past 24 hours.    Imaging Results    None       Intake/Output - Last 3 Shifts         11/05 0700 11/06 0659 11/06 0700 11/07 0659    P.O. 1450 2040    IV Piggyback 1000     Total Intake(mL/kg) 2450 (20) 2040 (16.7)    Net +2450 +2040          Urine Occurrence 2 x     Stool Occurrence 1 x           Microbiology Results (last 7 days)       Procedure Component Value  Units Date/Time    C diff toxin/antigen with reflex to PCR [[id number]]  (Normal) Collected: 11/06/24 0447    Order Status: Completed Specimen: Stool Updated: 11/06/24 1427     Clostridium Difficile Toxin A/B Negative     Clostridium Difficile GDH Antigen Negative    Narrative:      Interpretive Information:    Absence of both GDH antigen and toxin is consistent with absence of a C difficile infection.  Presence of both GDH antigen and toxin is consistent with a C difficile infection in a symptomatic patient. Detection of GDH and toxin in an asymptomatic patient is not specific for disease, as patients may be colonized with C difficile.  Presence of either GDH antigen or toxin coupled with presence of C difficile toxin B gene (ie, positive PCR test) is consistent with C difficile infection in a symptomatic patient.  Presence of GDH antigen coupled with absence of toxin and C difficile toxin B gene (ie, negative PCR test) is consistent with presence of a non-disease-causing strain of C difficile or another Clostridioides species.      Enteric Pathogen Panel [[id number]]  (Normal) Collected: 11/06/24 0447    Order Status: Completed Specimen: Stool Updated: 11/06/24 0804     Campylobacter Group SAMIRA Negative     Salmonella Species SAMIRA Negative     Shigella Species SAMIRA Negative     Vibrio Group SAMIRA Negative     Yersinia enterocolitica SAMIRA Negative     Shiga Toxin 1 SAMIRA Negative     Shiga Toxin 2 SAMIRA Negative     Norovirus GI/GII SAMIRA Negative     Rotavirus A SAMIRA Negative    Influenza A & B by Molecular [[id number]]  (Normal) Collected: 11/05/24 1751    Order Status: Completed Specimen: Nasopharyngeal Swab Updated: 11/05/24 1853     INFLUENZA A MOLECULAR Negative     INFLUENZA B MOLECULAR  Negative              Assessment/Plan:      * Sepsis  This patient does have evidence of infective focus  My overall impression is sepsis.  Source: Urinary Tract  Antibiotics given-   Antibiotics (72h ago, onward)      Start     Stop  Route Frequency Ordered    11/06/24 0900  cefTRIAXone injection 1 g  ( Urinary Tract Infection (UTI))         11/11/24 0859 IV Every 24 hours (non-standard times) 11/05/24 2035          Latest lactate reviewed-  Recent Labs   Lab 11/05/24 2038   LACTATE 1.4           Fluid challenge Ideal Body Weight- The patient's ideal body weight is Ideal body weight: 50.1 kg (110 lb 7.2 oz) which will be used to calculate fluid bolus of 30 ml/kg for treatment of septic shock.      Post- resuscitation assessment Yes Perfusion exam was performed within 6 hours of septic shock presentation after bolus shows Adequate tissue perfusion assessed by non-invasive monitoring       Will Not start Pressors-   Source control achieved by: rocephin    Right lower quadrant abdominal pain    None tender    History of Crohn's disease        UTI (urinary tract infection)  Suspected UTI based on previous UA, will start on rocephin, urine culture pending.       Hypokalemia  Patient's most recent potassium results are listed below.   Recent Labs     11/04/24  0755 11/05/24  1750 11/06/24  0428   K 2.7* 2.6* 3.7       Plan  - Replete potassium per protocol  - Monitor potassium Every 8 hours  - Patient's hypokalemia is worsening. Will adjust treatment as follows: add PO potassium BID  -     Crohn's disease of small intestine with other complication  Patient was recently diagnosed with crohns disease, reporting lose/runny bowel movements will consult GI. Enteric pathogen and C.diff panel pending.     11/06 this may be etiology of symptoms    Class 3 severe obesity with body mass index (BMI) of 45.0 to 49.9 in adult  Body mass index is 49.13 kg/m². Morbid obesity complicates all aspects of disease management from diagnostic modalities to treatment. Weight loss encouraged and health benefits explained to patient.         Hypertension  Patients blood pressure range in the last 24 hours was: BP  Min: 91/57  Max: 116/74.The patient's inpatient  anti-hypertensive regimen is listed below:  Current Antihypertensives       Plan  - BP is controlled, no changes needed to their regimen  - holding home losartan due to hypotension      VTE Risk Mitigation (From admission, onward)           Ordered     IP VTE HIGH RISK PATIENT  Once         11/05/24 2035     Place sequential compression device  Until discontinued         11/05/24 2035                    Discharge Planning   KAT: 11/6/2024     Code Status: Full Code   Is the patient medically ready for discharge?:     Reason for patient still in hospital (select all that apply): Laboratory test, Treatment, Imaging, and Consult recommendations  Discharge Plan A: Home with family                  Johnnie Henderson MD  Department of Hospital Medicine   [facility] - [facility]

## 2024-11-07 NOTE — ASSESSMENT & PLAN NOTE
Patient's most recent potassium results are listed below.   Recent Labs     11/04/24  0755 11/05/24  1750 11/06/24  0428   K 2.7* 2.6* 3.7       Plan  - Replete potassium per protocol  - Monitor potassium Every 8 hours  - Patient's hypokalemia is worsening. Will adjust treatment as follows: add PO potassium BID  -

## 2024-11-07 NOTE — NURSING
Her heart rate has been elevated through the night. At 1945 it was 124 . We did an EKG on her and started her on a 500mL bolus of LR per Dr. Shar Cortes due to her being tachycardic and hypotensive. Her rate then dropped to 100 and her blood pressure  was back within normal limits. Her heart rate went back up during 0400 rounds. It is now 106. Notified Dr. Henderson of heart rate.

## 2024-11-07 NOTE — HOSPITAL COURSE
11/06 Records reviewed. 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 Some loose stools without blood or fever. Saw DR Padilla and had colonoscopy with bx and dx crohn's dz.  Placed on imuran waiting on Humira that is now approved. Noted 5 days prior ear ache. Seen PCP and given amoxcil. Following day onset diarrhea. Then started having intermittent rigt sided and pain. Came to ER 11/04 and treated for low K and MG and given IVFs. Discharged but returned to ER with return pain. Temp then 101.6. No chills. No resp complaints. No NV. No dysuria. GB removed in 2000. Also prior hysto with BSO. Some pain still today. Stool studies negative. Talked with Dr Gonsalez.     11/07 Tolerating diet. Sore in abdomen. No diarrhea. Discussed with GI. To start Humira. Lab reviewed.  11/8 - summary.  44-year-old female with relatively recent diagnosis of ileal Crohn's disease admitted after being seen at clinic with complaints of nausea diarrhea and abdominal pain.  Patient had recently started Imuran but noted no improvement.  She was having fever greater than 101.  CT imaging showed fluid in the gut but no other acute findings.  Patient was treated with IV fluids.  Patient had abnormal potassium and Mag that were replaced.  Stool studies negative for infectious diarrhea.  GI was consulted, was felt that symptom complex was most likely related to her inflammatory bowel disease.  It should be noted that patient did have mildly abnormal LFTs.  On admission total bili 1.2 but elevated to 2.5.  Laboratory studies sent for autoimmune hepatitis which could be associated with her Crohn's.  Prior to admission patient had been approved for Humira but it had not come in yet.  We will in the hospital Humira arrived and patient was administered 1st dose of 160 mg.  She will receive 80 mg in 2 weeks then followed by 40 mg every 2 weeks.  By 11/8 it was felt that patient had met maximum benefit of  hospitalization and we will be discharged home.  She will follow up with Shruthi Winslow in GI clinic next week.  Autoimmune workup should be back by that time

## 2024-11-07 NOTE — PROGRESS NOTES
"CC: Crohn's disease    HPI 44 y.o. female with history of ileal Crohn's disease recently initiated on Imuran and plan to start Humira induction therapy. She has been approved but has not received medication yet for starting. Started having issues with an ear infection, took amoxicillin and started having severe abdominal pain. She normally has approximately 2 bowel movements daily usually but then started having 4 loose non-bloody bowel movements daily as well as abdominal pain after taking antibiotics. Reported severe abdominal pain in right lower quadrant with nausea that started Thursday. Inability to tolerate diet. She was not eating or drinking much and PO intake was poor. WBC 14.59 on admission, increased to 17.25. H/H 10/33.4. Tbili jumped to 2.5 and AP increased now 140.     CT showed nonspecific intraluminal fluid within loops of normal caliber colon. Enteric pathogen panel normal. C.diff neg. UA with WBC, mod bacteria, culture neg. Bcx ngtd.    Interval hx: WBC elevated and having low grade fevers. Infectious workup NGTD.     Past Medical History:   Diagnosis Date    Asthma     COVID     Hypertension     Kidney stone      Physical Examination  /78   Pulse (!) 122   Temp 99.6 °F (37.6 °C) (Oral)   Resp 16   Ht 5' 2" (1.575 m)   Wt 122.4 kg (269 lb 12.8 oz)   SpO2 (!) 94%   Breastfeeding No   BMI 49.35 kg/m²   General appearance: alert, cooperative, no distress  HENT: Normocephalic, atraumatic, neck symmetrical, no nasal discharge   Eyes: conjunctivae/corneas clear, PERRL, EOM's intact  Lungs: no labored breathing, symmetric chest wall expansion bilaterally  Heart: regular rate and rhythm without rub; no displacement of the PMI   Abdomen: soft, non-tender; bowel sounds normoactive; no organomegaly  Extremities: extremities symmetric; no clubbing, cyanosis, or edema  Integument: Skin color, texture, turgor normal; no rashes; hair distrubution normal  Neurologic: Alert and oriented X 3, normal " strength, normal coordination and gait  Psychiatric: no pressured speech; normal affect; no evidence of impaired cognition     Labs:  Lab Results   Component Value Date    WBC 17.18 (H) 11/07/2024    HGB 10.3 (L) 11/07/2024    HCT 33.1 (L) 11/07/2024    MCV 80.0 11/07/2024     11/07/2024       CMP  Sodium   Date Value Ref Range Status   11/07/2024 139 136 - 145 mmol/L Final     Potassium   Date Value Ref Range Status   11/07/2024 4.1 3.5 - 5.1 mmol/L Final     Chloride   Date Value Ref Range Status   11/07/2024 109 (H) 98 - 107 mmol/L Final     CO2   Date Value Ref Range Status   11/07/2024 26 21 - 32 mmol/L Final     Glucose   Date Value Ref Range Status   11/07/2024 100 74 - 106 mg/dL Final     BUN   Date Value Ref Range Status   11/07/2024 7 7 - 18 mg/dL Final     Creatinine   Date Value Ref Range Status   11/07/2024 0.79 0.55 - 1.02 mg/dL Final     Calcium   Date Value Ref Range Status   11/07/2024 8.6 8.5 - 10.1 mg/dL Final     Total Protein   Date Value Ref Range Status   11/07/2024 5.2 (L) 6.4 - 8.2 g/dL Final     Albumin   Date Value Ref Range Status   11/07/2024 2.1 (L) 3.5 - 5.0 g/dL Final     Bilirubin, Total   Date Value Ref Range Status   11/07/2024 3.3 (H) >0.0 - 1.2 mg/dL Final     Alk Phos   Date Value Ref Range Status   11/07/2024 135 (H) 39 - 100 U/L Final     AST   Date Value Ref Range Status   11/07/2024 22 15 - 37 U/L Final     ALT   Date Value Ref Range Status   11/07/2024 33 13 - 56 U/L Final     Anion Gap   Date Value Ref Range Status   11/07/2024 8 7 - 16 mmol/L Final     eGFR    Date Value Ref Range Status   11/10/2021 121 >=60 mL/min/1.73m² Final     eGFR   Date Value Ref Range Status   03/04/2022 134 >=60 mL/min/1.73m² Final       Imaging:  CT A/P:  1. Nonspecific intraluminal fluid within loops of normal caliber colon, as may be seen the setting of diarrheal illness.  2. Additional details of chronic and incidental findings, as provided in the body of the report.      Assessment:   Ileal Crohn's disease  Hypoalbuminemia  Leukocytosis  Abnormal LFTs  Anemia, normocytic    Plan:     -Advance diet as tolerates  -Monitor leukocytosis and abnormal LFTs  -CRP markedly elevated  -Most likely symptoms are related to underlying Crohn's disease given infectious work-up unrevealing  -Unclear whether bilirubin elevation is related to medication use but work-up for autoimmune liver disease in place  -Patient planning to start Humira induction tomorrow    Sai Sruthi Veerisetty, MD [facility]

## 2024-11-07 NOTE — PLAN OF CARE
Problem: Adult Inpatient Plan of Care  Goal: Plan of Care Review  Outcome: Progressing  Goal: Patient-Specific Goal (Individualized)  Outcome: Progressing  Goal: Absence of Hospital-Acquired Illness or Injury  Outcome: Progressing  Goal: Optimal Comfort and Wellbeing  Outcome: Progressing  Goal: Readiness for Transition of Care  Outcome: Progressing     Problem: Bariatric Environmental Safety  Goal: Safety Maintained with Care  Outcome: Progressing     Problem: Sepsis/Septic Shock  Goal: Optimal Coping  Outcome: Progressing  Goal: Absence of Bleeding  Outcome: Progressing  Goal: Blood Glucose Level Within Targeted Range  Outcome: Progressing  Goal: Absence of Infection Signs and Symptoms  Outcome: Progressing  Goal: Optimal Nutrition Intake  Outcome: Progressing

## 2024-11-07 NOTE — SUBJECTIVE & OBJECTIVE
Interval History:     Review of Systems   Constitutional:  Positive for fever. Negative for appetite change and fatigue.   HENT:  Negative for congestion, hearing loss and trouble swallowing.    Respiratory:  Negative for chest tightness, shortness of breath and wheezing.    Cardiovascular:  Negative for chest pain and palpitations.   Gastrointestinal:  Positive for abdominal pain and diarrhea. Negative for constipation and nausea.   Genitourinary:  Negative for difficulty urinating and dysuria.   Musculoskeletal:  Negative for back pain and neck stiffness.   Skin:  Negative for pallor and rash.   Neurological:  Negative for dizziness, speech difficulty and headaches.   Psychiatric/Behavioral:  Negative for confusion and suicidal ideas.      Objective:     Vital Signs (Most Recent):  Temp: (!) 100.4 °F (38 °C) (11/06/24 2011)  Pulse: (!) 124 (11/06/24 1945)  Resp: 18 (11/06/24 1945)  BP: (!) 82/50 (11/06/24 1945)  SpO2: 99 % (11/06/24 1115) Vital Signs (24h Range):  Temp:  [97.4 °F (36.3 °C)-100.4 °F (38 °C)] 100.4 °F (38 °C)  Pulse:  [] 124  Resp:  [16-24] 18  SpO2:  [95 %-100 %] 99 %  BP: ()/(50-68) 82/50     Weight: 122.4 kg (269 lb 12.8 oz)  Body mass index is 49.35 kg/m².    Intake/Output Summary (Last 24 hours) at 11/6/2024 2321  Last data filed at 11/6/2024 2011  Gross per 24 hour   Intake 2640 ml   Output --   Net 2640 ml         Physical Exam  Vitals reviewed.   Constitutional:       General: She is awake. She is not in acute distress.     Appearance: She is well-developed. She is morbidly obese. She is not toxic-appearing.   HENT:      Head: Normocephalic.      Nose: Nose normal.      Mouth/Throat:      Pharynx: Oropharynx is clear.   Eyes:      Extraocular Movements: Extraocular movements intact.      Pupils: Pupils are equal, round, and reactive to light.   Neck:      Thyroid: No thyroid mass.      Vascular: No carotid bruit.   Cardiovascular:      Rate and Rhythm: Normal rate and regular  rhythm.      Pulses: Normal pulses.      Heart sounds: Normal heart sounds. No murmur heard.  Pulmonary:      Effort: Pulmonary effort is normal.      Breath sounds: Normal breath sounds and air entry. No wheezing.   Abdominal:      General: Bowel sounds are increased. There is no distension.      Palpations: Abdomen is soft.      Tenderness: There is no abdominal tenderness.   Musculoskeletal:         General: Normal range of motion.      Cervical back: Neck supple. No rigidity.   Skin:     General: Skin is warm.      Coloration: Skin is not jaundiced.      Findings: No lesion.   Neurological:      General: No focal deficit present.      Mental Status: She is alert and oriented to person, place, and time.      Cranial Nerves: No cranial nerve deficit.   Psychiatric:         Attention and Perception: Attention normal.         Mood and Affect: Mood normal.         Behavior: Behavior normal. Behavior is cooperative.         Thought Content: Thought content normal.         Cognition and Memory: Cognition normal.             Significant Labs: All pertinent labs within the past 24 hours have been reviewed.  BMP:   Recent Labs   Lab 11/06/24  0428   GLU 96      K 3.7   *   CO2 24   BUN 11   CREATININE 0.82   CALCIUM 8.1*   MG 2.0     CBC:   Recent Labs   Lab 11/05/24  1751 11/06/24  0428   WBC 14.59* 17.25*   HGB 10.6* 10.0*   HCT 34.8* 33.4*    311     CMP:   Recent Labs   Lab 11/05/24  1750 11/06/24  0428   * 140   K 2.6* 3.7    108*   CO2 23 24   GLU 98 96   BUN 10 11   CREATININE 0.85 0.82   CALCIUM 8.5 8.1*   PROT 7.1 5.4*   ALBUMIN 2.7* 2.3*   BILITOT 1.2 2.5*   ALKPHOS 158* 140*   AST 40* 28   ALT 52 40   ANIONGAP 13 12       Significant Imaging: I have reviewed all pertinent imaging results/findings within the past 24 hours.    Imaging Results    None       Intake/Output - Last 3 Shifts         11/05 0700 11/06 0659 11/06 0700 11/07 0659    P.O. 1450 2040    IV Piggyback 1000      Total Intake(mL/kg) 2450 (20) 2040 (16.7)    Net +2450 +2040          Urine Occurrence 2 x     Stool Occurrence 1 x           Microbiology Results (last 7 days)       Procedure Component Value Units Date/Time    C diff toxin/antigen with reflex to PCR [[id number]]  (Normal) Collected: 11/06/24 0447    Order Status: Completed Specimen: Stool Updated: 11/06/24 1427     Clostridium Difficile Toxin A/B Negative     Clostridium Difficile GDH Antigen Negative    Narrative:      Interpretive Information:    Absence of both GDH antigen and toxin is consistent with absence of a C difficile infection.  Presence of both GDH antigen and toxin is consistent with a C difficile infection in a symptomatic patient. Detection of GDH and toxin in an asymptomatic patient is not specific for disease, as patients may be colonized with C difficile.  Presence of either GDH antigen or toxin coupled with presence of C difficile toxin B gene (ie, positive PCR test) is consistent with C difficile infection in a symptomatic patient.  Presence of GDH antigen coupled with absence of toxin and C difficile toxin B gene (ie, negative PCR test) is consistent with presence of a non-disease-causing strain of C difficile or another Clostridioides species.      Enteric Pathogen Panel [[id number]]  (Normal) Collected: 11/06/24 0447    Order Status: Completed Specimen: Stool Updated: 11/06/24 0804     Campylobacter Group SAMIRA Negative     Salmonella Species SAMIRA Negative     Shigella Species SAMIRA Negative     Vibrio Group SAMIRA Negative     Yersinia enterocolitica SAMIRA Negative     Shiga Toxin 1 SAMIRA Negative     Shiga Toxin 2 SAMIRA Negative     Norovirus GI/GII SAMIRA Negative     Rotavirus A SAMIRA Negative    Influenza A & B by Molecular [[id number]]  (Normal) Collected: 11/05/24 1751    Order Status: Completed Specimen: Nasopharyngeal Swab Updated: 11/05/24 1853     INFLUENZA A MOLECULAR Negative     INFLUENZA B MOLECULAR  Negative

## 2024-11-08 VITALS
WEIGHT: 269.81 LBS | DIASTOLIC BLOOD PRESSURE: 79 MMHG | HEART RATE: 108 BPM | SYSTOLIC BLOOD PRESSURE: 137 MMHG | OXYGEN SATURATION: 97 % | BODY MASS INDEX: 49.65 KG/M2 | TEMPERATURE: 98 F | HEIGHT: 62 IN | RESPIRATION RATE: 20 BRPM

## 2024-11-08 PROBLEM — R11.2 NAUSEA VOMITING AND DIARRHEA: Status: ACTIVE | Noted: 2024-11-08

## 2024-11-08 PROBLEM — R19.7 NAUSEA VOMITING AND DIARRHEA: Status: ACTIVE | Noted: 2024-11-08

## 2024-11-08 LAB
ALBUMIN SERPL BCP-MCNC: 2.1 G/DL (ref 3.5–5)
ALBUMIN/GLOB SERPL: 0.7 {RATIO}
ALP SERPL-CCNC: 139 U/L (ref 39–100)
ALT SERPL W P-5'-P-CCNC: 29 U/L (ref 13–56)
ANION GAP SERPL CALCULATED.3IONS-SCNC: 10 MMOL/L (ref 7–16)
AST SERPL W P-5'-P-CCNC: 27 U/L (ref 15–37)
BASOPHILS # BLD AUTO: 0.07 K/UL (ref 0–0.2)
BASOPHILS NFR BLD AUTO: 0.4 % (ref 0–1)
BILIRUB SERPL-MCNC: 3.9 MG/DL (ref ?–1.2)
BUN SERPL-MCNC: 5 MG/DL (ref 7–18)
BUN/CREAT SERPL: 6 (ref 6–20)
CALCIUM SERPL-MCNC: 8.6 MG/DL (ref 8.5–10.1)
CHLORIDE SERPL-SCNC: 108 MMOL/L (ref 98–107)
CO2 SERPL-SCNC: 26 MMOL/L (ref 21–32)
CREAT SERPL-MCNC: 0.82 MG/DL (ref 0.55–1.02)
DIFFERENTIAL METHOD BLD: ABNORMAL
EGFR (NO RACE VARIABLE) (RUSH/TITUS): 89 ML/MIN/1.73M2
EOSINOPHIL # BLD AUTO: 0.28 K/UL (ref 0–0.5)
EOSINOPHIL NFR BLD AUTO: 1.7 % (ref 1–4)
ERYTHROCYTE [DISTWIDTH] IN BLOOD BY AUTOMATED COUNT: 14.5 % (ref 11.5–14.5)
GLOBULIN SER-MCNC: 3.2 G/DL (ref 2–4)
GLUCOSE SERPL-MCNC: 80 MG/DL (ref 74–106)
HCT VFR BLD AUTO: 32.3 % (ref 38–47)
HGB BLD-MCNC: 10.2 G/DL (ref 12–16)
IMM GRANULOCYTES # BLD AUTO: 0.42 K/UL (ref 0–0.04)
IMM GRANULOCYTES NFR BLD: 2.5 % (ref 0–0.4)
LKM-1 AB SER-ACNC: <5 U
LYMPHOCYTES # BLD AUTO: 1.15 K/UL (ref 1–4.8)
LYMPHOCYTES NFR BLD AUTO: 6.8 % (ref 27–41)
MAGNESIUM SERPL-MCNC: 2 MG/DL (ref 1.7–2.3)
MCH RBC QN AUTO: 24.8 PG (ref 27–31)
MCHC RBC AUTO-ENTMCNC: 31.6 G/DL (ref 32–36)
MCV RBC AUTO: 78.6 FL (ref 80–96)
MONOCYTES # BLD AUTO: 0.28 K/UL (ref 0–0.8)
MONOCYTES NFR BLD AUTO: 1.7 % (ref 2–6)
MPC BLD CALC-MCNC: 11.2 FL (ref 9.4–12.4)
NEUTROPHILS # BLD AUTO: 14.69 K/UL (ref 1.8–7.7)
NEUTROPHILS NFR BLD AUTO: 86.9 % (ref 53–65)
NRBC # BLD AUTO: 0 X10E3/UL
NRBC, AUTO (.00): 0 %
PHOSPHATE SERPL-MCNC: 1.2 MG/DL (ref 2.5–4.5)
PLATELET # BLD AUTO: 389 K/UL (ref 150–400)
POTASSIUM SERPL-SCNC: 4.8 MMOL/L (ref 3.5–5.1)
PROT SERPL-MCNC: 5.3 G/DL (ref 6.4–8.2)
RBC # BLD AUTO: 4.11 M/UL (ref 4.2–5.4)
SODIUM SERPL-SCNC: 139 MMOL/L (ref 136–145)
WBC # BLD AUTO: 16.89 K/UL (ref 4.5–11)

## 2024-11-08 PROCEDURE — 99232 SBSQ HOSP IP/OBS MODERATE 35: CPT | Mod: ,,, | Performed by: INTERNAL MEDICINE

## 2024-11-08 PROCEDURE — 63600175 PHARM REV CODE 636 W HCPCS: Performed by: HOSPITALIST

## 2024-11-08 PROCEDURE — 25000003 PHARM REV CODE 250: Performed by: HOSPITALIST

## 2024-11-08 PROCEDURE — 25000003 PHARM REV CODE 250: Performed by: FAMILY MEDICINE

## 2024-11-08 PROCEDURE — 80053 COMPREHEN METABOLIC PANEL: CPT

## 2024-11-08 PROCEDURE — 36415 COLL VENOUS BLD VENIPUNCTURE: CPT

## 2024-11-08 PROCEDURE — 25000003 PHARM REV CODE 250: Performed by: NURSE PRACTITIONER

## 2024-11-08 PROCEDURE — 63700000 PHARM REV CODE 250 ALT 637 W/O HCPCS

## 2024-11-08 PROCEDURE — 85025 COMPLETE CBC W/AUTO DIFF WBC: CPT

## 2024-11-08 PROCEDURE — 63700000 PHARM REV CODE 250 ALT 637 W/O HCPCS: Performed by: FAMILY MEDICINE

## 2024-11-08 PROCEDURE — 83735 ASSAY OF MAGNESIUM: CPT

## 2024-11-08 PROCEDURE — 99239 HOSP IP/OBS DSCHRG MGMT >30: CPT | Mod: ,,, | Performed by: FAMILY MEDICINE

## 2024-11-08 PROCEDURE — 84100 ASSAY OF PHOSPHORUS: CPT

## 2024-11-08 PROCEDURE — 63600175 PHARM REV CODE 636 W HCPCS

## 2024-11-08 PROCEDURE — 25000003 PHARM REV CODE 250

## 2024-11-08 RX ORDER — FLUCONAZOLE 150 MG/1
150 TABLET ORAL DAILY
Qty: 1 TABLET | Refills: 0 | Status: SHIPPED | OUTPATIENT
Start: 2024-11-08 | End: 2024-11-09

## 2024-11-08 RX ORDER — POTASSIUM CHLORIDE 750 MG/1
10 TABLET, EXTENDED RELEASE ORAL 2 TIMES DAILY
Qty: 90 TABLET | Refills: 3 | Status: SHIPPED | OUTPATIENT
Start: 2024-11-08

## 2024-11-08 RX ORDER — BENZONATATE 200 MG/1
200 CAPSULE ORAL 3 TIMES DAILY PRN
Qty: 30 CAPSULE | Refills: 1 | Status: SHIPPED | OUTPATIENT
Start: 2024-11-08 | End: 2024-11-28

## 2024-11-08 RX ADMIN — ACETAMINOPHEN 650 MG: 325 TABLET ORAL at 04:11

## 2024-11-08 RX ADMIN — Medication 600 ML: at 12:11

## 2024-11-08 RX ADMIN — POTASSIUM CHLORIDE 40 MEQ: 1500 TABLET, EXTENDED RELEASE ORAL at 08:11

## 2024-11-08 RX ADMIN — Medication 600 ML: at 04:11

## 2024-11-08 RX ADMIN — AZATHIOPRINE 100 MG: 100 TABLET ORAL at 08:11

## 2024-11-08 RX ADMIN — Medication 600 ML: at 08:11

## 2024-11-08 RX ADMIN — CEFTRIAXONE SODIUM 1 G: 1 INJECTION, POWDER, FOR SOLUTION INTRAMUSCULAR; INTRAVENOUS at 08:11

## 2024-11-08 RX ADMIN — FLUCONAZOLE 150 MG: 50 TABLET ORAL at 04:11

## 2024-11-08 RX ADMIN — MORPHINE SULFATE 4 MG: 4 INJECTION INTRAVENOUS at 03:11

## 2024-11-08 RX ADMIN — HYDROCODONE BITARTRATE AND ACETAMINOPHEN 1 TABLET: 5; 325 TABLET ORAL at 09:11

## 2024-11-08 NOTE — ASSESSMENT & PLAN NOTE
Patient was recently diagnosed with crohns disease, reporting lose/runny bowel movements will consult GI. Enteric pathogen and C.diff panel pending.     11/06 this may be etiology of symptoms  11/07 To start Humira

## 2024-11-08 NOTE — NURSING
Pt dc instructions reviewed with patient and friend. She verbalized understanding and voiced no further needs. IV removed. Pt tolerated well. Dc to home or self care via wheelchair. No acute distress upon discharge.

## 2024-11-08 NOTE — NURSING
Pt discharged. Teaching done with patient. Iv removed. Pt wheeled to private car via wheelchair. Care released

## 2024-11-08 NOTE — PROGRESS NOTES
[facility] - [address] [place] [address] Medicine  Progress Note    Patient Name: Rosa Rutledge  MRN: [id number]  Patient Class: IP- Inpatient   Admission Date: 11/5/2024  Length of Stay: 2 days  Attending Physician: Johnnie Henderson MD  Primary Care Provider: Karis, Primary Doctor        Subjective:     Principal Problem:Right lower quadrant abdominal pain        HPI:  Patient is a 43 yo F presents to [facility] with complaints of abdominal pain and painful urination. Patient was seen in the ED yesterday and was diagnosed with UTI and was sent home on keflex. Patient was seen in clinic today and reported worsening symptoms and was sent back to the ED.     Initial presenting vitals were  Bp 116/74, Spo2 97%, Temp 101.6. initial labs  WBC 14.59, H/H 10.6/34.8, Platelet 325, Na 135, K 2.6, Cl 102, Co2 23, BUN/Cr 10/0.85, lactic acid 2.2. patient was give 1 l NS bolus and started on zosyn in the ED.     This patient will be admitted to [facility] for continued medical management.     Overview/Hospital Course:  11/06 Records reviewed. 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 Some loose stools without blood or fever. Saw DR Padilla and had colonoscopy with bx and dx crohn's dz.  Placed on imuran waiting on Humira that is now approved. Noted 5 days prior ear ache. Seen PCP and given amoxcil. Following day onset diarrhea. Then started having intermittent rigt sided and pain. Came to ER 11/04 and treated for low K and MG and given IVFs. Discharged but returned to ER with return pain. Temp then 101.6. No chills. No resp complaints. No NV. No dysuria. GB removed in 2000. Also prior hysto with BSO. Some pain still today. Stool studies negative. Talked with Dr Gonsalez.     11/07 Tolerating diet. Sore in abdomen. No diarrhea. Discussed with GI. To start Humira. Lab reviewed.    Interval History:     Review of Systems   Constitutional:   Positive for fever. Negative for appetite change and fatigue.   HENT:  Negative for congestion, hearing loss and trouble swallowing.    Respiratory:  Negative for chest tightness, shortness of breath and wheezing.    Cardiovascular:  Negative for chest pain and palpitations.   Gastrointestinal:  Positive for abdominal pain and diarrhea. Negative for constipation and nausea.   Genitourinary:  Negative for difficulty urinating and dysuria.   Musculoskeletal:  Negative for back pain and neck stiffness.   Skin:  Negative for pallor and rash.   Neurological:  Negative for dizziness, speech difficulty and headaches.   Psychiatric/Behavioral:  Negative for confusion and suicidal ideas.      Objective:     Vital Signs (Most Recent):  Temp: 99.6 °F (37.6 °C) (11/07/24 1627)  Pulse: (!) 122 (11/07/24 1627)  Resp: 16 (11/07/24 1627)  BP: 128/78 (11/07/24 1627)  SpO2: (!) 94 % (11/07/24 1941) Vital Signs (24h Range):  Temp:  [98.4 °F (36.9 °C)-99.6 °F (37.6 °C)] 99.6 °F (37.6 °C)  Pulse:  [] 122  Resp:  [16-18] 16  SpO2:  [94 %-99 %] 94 %  BP: (107-136)/(72-86) 128/78     Weight: 122.4 kg (269 lb 12.8 oz)  Body mass index is 49.35 kg/m².    Intake/Output Summary (Last 24 hours) at 11/7/2024 2210  Last data filed at 11/7/2024 2120  Gross per 24 hour   Intake 1980 ml   Output --   Net 1980 ml         Physical Exam  Vitals reviewed.   Constitutional:       General: She is awake. She is not in acute distress.     Appearance: She is well-developed. She is morbidly obese. She is not toxic-appearing.   HENT:      Head: Normocephalic.      Nose: Nose normal.      Mouth/Throat:      Pharynx: Oropharynx is clear.   Eyes:      Extraocular Movements: Extraocular movements intact.      Pupils: Pupils are equal, round, and reactive to light.   Neck:      Thyroid: No thyroid mass.      Vascular: No carotid bruit.   Cardiovascular:      Rate and Rhythm: Normal rate and regular rhythm.      Pulses: Normal pulses.      Heart sounds: Normal  heart sounds. No murmur heard.  Pulmonary:      Effort: Pulmonary effort is normal.      Breath sounds: Normal breath sounds and air entry. No wheezing.   Abdominal:      General: Bowel sounds are increased. There is no distension.      Palpations: Abdomen is soft.      Tenderness: There is no abdominal tenderness.   Musculoskeletal:         General: Normal range of motion.      Cervical back: Neck supple. No rigidity.   Skin:     General: Skin is warm.      Coloration: Skin is not jaundiced.      Findings: No lesion.   Neurological:      General: No focal deficit present.      Mental Status: She is alert and oriented to person, place, and time.      Cranial Nerves: No cranial nerve deficit.   Psychiatric:         Attention and Perception: Attention normal.         Mood and Affect: Mood normal.         Behavior: Behavior normal. Behavior is cooperative.         Thought Content: Thought content normal.         Cognition and Memory: Cognition normal.             Significant Labs: All pertinent labs within the past 24 hours have been reviewed.  BMP:   Recent Labs   Lab 11/07/24 0519         K 4.1   *   CO2 26   BUN 7   CREATININE 0.79   CALCIUM 8.6   MG 2.3     CBC:   Recent Labs   Lab 11/06/24 0428 11/07/24 0519   WBC 17.25* 17.18*   HGB 10.0* 10.3*   HCT 33.4* 33.1*    337     CMP:   Recent Labs   Lab 11/06/24 0428 11/07/24 0519    139   K 3.7 4.1   * 109*   CO2 24 26   GLU 96 100   BUN 11 7   CREATININE 0.82 0.79   CALCIUM 8.1* 8.6   PROT 5.4* 5.2*   ALBUMIN 2.3* 2.1*   BILITOT 2.5* 3.3*   ALKPHOS 140* 135*   AST 28 22   ALT 40 33   ANIONGAP 12 8       Significant Imaging: I have reviewed all pertinent imaging results/findings within the past 24 hours.    Imaging Results    None       Intake/Output - Last 3 Shifts         11/06 0700 11/07 0659 11/07 0700 11/08 0659    P.O. 2580 1440    IV Piggyback      Total Intake(mL/kg) 2580 (21.1) 1440 (11.8)    Net +2580 +1440           Urine Occurrence 4 x 1 x          Microbiology Results (last 7 days)       Procedure Component Value Units Date/Time    C diff toxin/antigen with reflex to PCR [[id number]]  (Normal) Collected: 11/06/24 0447    Order Status: Completed Specimen: Stool Updated: 11/06/24 1427     Clostridium Difficile Toxin A/B Negative     Clostridium Difficile GDH Antigen Negative    Narrative:      Interpretive Information:    Absence of both GDH antigen and toxin is consistent with absence of a C difficile infection.  Presence of both GDH antigen and toxin is consistent with a C difficile infection in a symptomatic patient. Detection of GDH and toxin in an asymptomatic patient is not specific for disease, as patients may be colonized with C difficile.  Presence of either GDH antigen or toxin coupled with presence of C difficile toxin B gene (ie, positive PCR test) is consistent with C difficile infection in a symptomatic patient.  Presence of GDH antigen coupled with absence of toxin and C difficile toxin B gene (ie, negative PCR test) is consistent with presence of a non-disease-causing strain of C difficile or another Clostridioides species.      Enteric Pathogen Panel [[id number]]  (Normal) Collected: 11/06/24 0447    Order Status: Completed Specimen: Stool Updated: 11/06/24 0804     Campylobacter Group SAMIRA Negative     Salmonella Species SAMIRA Negative     Shigella Species SAMIRA Negative     Vibrio Group SAMIRA Negative     Yersinia enterocolitica SAMIRA Negative     Shiga Toxin 1 SAMIRA Negative     Shiga Toxin 2 SAMIRA Negative     Norovirus GI/GII SAMIRA Negative     Rotavirus A SAMIRA Negative    Influenza A & B by Molecular [[id number]]  (Normal) Collected: 11/05/24 1751    Order Status: Completed Specimen: Nasopharyngeal Swab Updated: 11/05/24 1853     INFLUENZA A MOLECULAR Negative     INFLUENZA B MOLECULAR  Negative              Assessment/Plan:      * Right lower quadrant abdominal pain    None tender    History of Crohn's  disease        UTI (urinary tract infection)  Suspected UTI based on previous UA, will start on rocephin, urine culture pending.   11/07 Culture negative      Hypokalemia  Patient's most recent potassium results are listed below.   Recent Labs     11/05/24  1750 11/06/24  0428 11/07/24  0519   K 2.6* 3.7 4.1       Plan  - Replete potassium per protocol  - Monitor potassium Every 8 hours  - Patient's hypokalemia is worsening. Will adjust treatment as follows: add PO potassium BID  -     Sepsis  This patient does have evidence of infective focus  My overall impression is sepsis.  Source: Urinary Tract  Antibiotics given-   Antibiotics (72h ago, onward)      Start     Stop Route Frequency Ordered    11/06/24 0900  cefTRIAXone injection 1 g  ( Urinary Tract Infection (UTI))         11/11/24 0859 IV Every 24 hours (non-standard times) 11/05/24 2035          Latest lactate reviewed-  Recent Labs   Lab 11/05/24 2038   LACTATE 1.4           Fluid challenge Ideal Body Weight- The patient's ideal body weight is Ideal body weight: 50.1 kg (110 lb 7.2 oz) which will be used to calculate fluid bolus of 30 ml/kg for treatment of septic shock.      Post- resuscitation assessment Yes Perfusion exam was performed within 6 hours of septic shock presentation after bolus shows Adequate tissue perfusion assessed by non-invasive monitoring       Will Not start Pressors-   Source control achieved by: rocephin    Crohn's disease of small intestine with other complication  Patient was recently diagnosed with crohns disease, reporting lose/runny bowel movements will consult GI. Enteric pathogen and C.diff panel pending.     11/06 this may be etiology of symptoms  11/07 To start Humira    Class 3 severe obesity with body mass index (BMI) of 45.0 to 49.9 in adult  Body mass index is 49.13 kg/m². Morbid obesity complicates all aspects of disease management from diagnostic modalities to treatment. Weight loss encouraged and health benefits  explained to patient.         Hypertension  Patients blood pressure range in the last 24 hours was: BP  Min: 82/50  Max: 136/86.The patient's inpatient anti-hypertensive regimen is listed below:  Current Antihypertensives       Plan  - BP is controlled, no changes needed to their regimen  - holding home losartan due to hypotension      VTE Risk Mitigation (From admission, onward)           Ordered     IP VTE HIGH RISK PATIENT  Once         11/05/24 2035     Place sequential compression device  Until discontinued         11/05/24 2035                 Patient is in need of IV fluids for the treatment of dehydration.  Due to a shortage of IV fluids, patient to receive oral hydration.  Patient must receive this treatment in a hospital location due to the risk of adverse effects, insufficient response to treatment, or other potential complications of disease such as tachycardia, hypotension and dehydration.          Discharge Planning   KAT: 11/8/2024     Code Status: Full Code   Is the patient medically ready for discharge?:     Reason for patient still in hospital (select all that apply): Laboratory test, Treatment, Imaging, and Consult recommendations  Discharge Plan A: Home with family                  Johnnie Henderson MD  Department of Hospital Medicine   [facility] - [facility] Tele[facility]

## 2024-11-08 NOTE — ASSESSMENT & PLAN NOTE
Suspected UTI based on previous UA, will start on rocephin, urine culture pending.   11/07 Culture negative

## 2024-11-08 NOTE — ASSESSMENT & PLAN NOTE
Patients blood pressure range in the last 24 hours was: BP  Min: 82/50  Max: 136/86.The patient's inpatient anti-hypertensive regimen is listed below:  Current Antihypertensives       Plan  - BP is controlled, no changes needed to their regimen  - holding home losartan due to hypotension

## 2024-11-08 NOTE — SUBJECTIVE & OBJECTIVE
Interval History:     Review of Systems   Constitutional:  Positive for fever. Negative for appetite change and fatigue.   HENT:  Negative for congestion, hearing loss and trouble swallowing.    Respiratory:  Negative for chest tightness, shortness of breath and wheezing.    Cardiovascular:  Negative for chest pain and palpitations.   Gastrointestinal:  Positive for abdominal pain and diarrhea. Negative for constipation and nausea.   Genitourinary:  Negative for difficulty urinating and dysuria.   Musculoskeletal:  Negative for back pain and neck stiffness.   Skin:  Negative for pallor and rash.   Neurological:  Negative for dizziness, speech difficulty and headaches.   Psychiatric/Behavioral:  Negative for confusion and suicidal ideas.      Objective:     Vital Signs (Most Recent):  Temp: 99.6 °F (37.6 °C) (11/07/24 1627)  Pulse: (!) 122 (11/07/24 1627)  Resp: 16 (11/07/24 1627)  BP: 128/78 (11/07/24 1627)  SpO2: (!) 94 % (11/07/24 1941) Vital Signs (24h Range):  Temp:  [98.4 °F (36.9 °C)-99.6 °F (37.6 °C)] 99.6 °F (37.6 °C)  Pulse:  [] 122  Resp:  [16-18] 16  SpO2:  [94 %-99 %] 94 %  BP: (107-136)/(72-86) 128/78     Weight: 122.4 kg (269 lb 12.8 oz)  Body mass index is 49.35 kg/m².    Intake/Output Summary (Last 24 hours) at 11/7/2024 2210  Last data filed at 11/7/2024 2120  Gross per 24 hour   Intake 1980 ml   Output --   Net 1980 ml         Physical Exam  Vitals reviewed.   Constitutional:       General: She is awake. She is not in acute distress.     Appearance: She is well-developed. She is morbidly obese. She is not toxic-appearing.   HENT:      Head: Normocephalic.      Nose: Nose normal.      Mouth/Throat:      Pharynx: Oropharynx is clear.   Eyes:      Extraocular Movements: Extraocular movements intact.      Pupils: Pupils are equal, round, and reactive to light.   Neck:      Thyroid: No thyroid mass.      Vascular: No carotid bruit.   Cardiovascular:      Rate and Rhythm: Normal rate and regular  rhythm.      Pulses: Normal pulses.      Heart sounds: Normal heart sounds. No murmur heard.  Pulmonary:      Effort: Pulmonary effort is normal.      Breath sounds: Normal breath sounds and air entry. No wheezing.   Abdominal:      General: Bowel sounds are increased. There is no distension.      Palpations: Abdomen is soft.      Tenderness: There is no abdominal tenderness.   Musculoskeletal:         General: Normal range of motion.      Cervical back: Neck supple. No rigidity.   Skin:     General: Skin is warm.      Coloration: Skin is not jaundiced.      Findings: No lesion.   Neurological:      General: No focal deficit present.      Mental Status: She is alert and oriented to person, place, and time.      Cranial Nerves: No cranial nerve deficit.   Psychiatric:         Attention and Perception: Attention normal.         Mood and Affect: Mood normal.         Behavior: Behavior normal. Behavior is cooperative.         Thought Content: Thought content normal.         Cognition and Memory: Cognition normal.             Significant Labs: All pertinent labs within the past 24 hours have been reviewed.  BMP:   Recent Labs   Lab 11/07/24 0519         K 4.1   *   CO2 26   BUN 7   CREATININE 0.79   CALCIUM 8.6   MG 2.3     CBC:   Recent Labs   Lab 11/06/24 0428 11/07/24 0519   WBC 17.25* 17.18*   HGB 10.0* 10.3*   HCT 33.4* 33.1*    337     CMP:   Recent Labs   Lab 11/06/24 0428 11/07/24 0519    139   K 3.7 4.1   * 109*   CO2 24 26   GLU 96 100   BUN 11 7   CREATININE 0.82 0.79   CALCIUM 8.1* 8.6   PROT 5.4* 5.2*   ALBUMIN 2.3* 2.1*   BILITOT 2.5* 3.3*   ALKPHOS 140* 135*   AST 28 22   ALT 40 33   ANIONGAP 12 8       Significant Imaging: I have reviewed all pertinent imaging results/findings within the past 24 hours.    Imaging Results    None       Intake/Output - Last 3 Shifts         11/06 0700 11/07 0659 11/07 0700 11/08 0659    P.O. 2580 1440    IV Piggyback      Total  Intake(mL/kg) 2580 (21.1) 1440 (11.8)    Net +2580 +1440          Urine Occurrence 4 x 1 x          Microbiology Results (last 7 days)       Procedure Component Value Units Date/Time    C diff toxin/antigen with reflex to PCR [[id number]]  (Normal) Collected: 11/06/24 0447    Order Status: Completed Specimen: Stool Updated: 11/06/24 1427     Clostridium Difficile Toxin A/B Negative     Clostridium Difficile GDH Antigen Negative    Narrative:      Interpretive Information:    Absence of both GDH antigen and toxin is consistent with absence of a C difficile infection.  Presence of both GDH antigen and toxin is consistent with a C difficile infection in a symptomatic patient. Detection of GDH and toxin in an asymptomatic patient is not specific for disease, as patients may be colonized with C difficile.  Presence of either GDH antigen or toxin coupled with presence of C difficile toxin B gene (ie, positive PCR test) is consistent with C difficile infection in a symptomatic patient.  Presence of GDH antigen coupled with absence of toxin and C difficile toxin B gene (ie, negative PCR test) is consistent with presence of a non-disease-causing strain of C difficile or another Clostridioides species.      Enteric Pathogen Panel [[id number]]  (Normal) Collected: 11/06/24 0447    Order Status: Completed Specimen: Stool Updated: 11/06/24 0804     Campylobacter Group SAMIRA Negative     Salmonella Species SAMIRA Negative     Shigella Species SAMIRA Negative     Vibrio Group SAMIRA Negative     Yersinia enterocolitica SAMIRA Negative     Shiga Toxin 1 SAMIRA Negative     Shiga Toxin 2 SAMIRA Negative     Norovirus GI/GII SAMIRA Negative     Rotavirus A SAMIRA Negative    Influenza A & B by Molecular [[id number]]  (Normal) Collected: 11/05/24 1751    Order Status: Completed Specimen: Nasopharyngeal Swab Updated: 11/05/24 1853     INFLUENZA A MOLECULAR Negative     INFLUENZA B MOLECULAR  Negative

## 2024-11-08 NOTE — ASSESSMENT & PLAN NOTE
Patient's most recent potassium results are listed below.   Recent Labs     11/05/24  1750 11/06/24  0428 11/07/24  0519   K 2.6* 3.7 4.1       Plan  - Replete potassium per protocol  - Monitor potassium Every 8 hours  - Patient's hypokalemia is worsening. Will adjust treatment as follows: add PO potassium BID  -

## 2024-11-08 NOTE — PROGRESS NOTES
"CC: Crohn's disease    HPI 44 y.o. female with history of ileal Crohn's disease recently initiated on Imuran and plan to start Humira induction therapy. She has been approved but has not received medication yet for starting. Started having issues with an ear infection, took amoxicillin and started having severe abdominal pain. She normally has approximately 2 bowel movements daily usually but then started having 4 loose non-bloody bowel movements daily as well as abdominal pain after taking antibiotics. Reported severe abdominal pain in right lower quadrant with nausea that started Thursday. Inability to tolerate diet. She was not eating or drinking much and PO intake was poor. WBC 14.59 on admission, increased to 17.25. H/H 10/33.4. Tbili jumped to 2.5 and AP increased now 140.     CT showed nonspecific intraluminal fluid within loops of normal caliber colon. Enteric pathogen panel normal. C.diff neg. UA with WBC, mod bacteria, culture neg. Bcx ngtd.    Interval hx:   Symptoms stable. Without any abdominal pain or nausea. Tolerated diet.     Past Medical History:   Diagnosis Date    Asthma     COVID     Hypertension     Kidney stone      Physical Examination  /85 (BP Location: Right arm, Patient Position: Sitting)   Pulse 92   Temp 98.1 °F (36.7 °C) (Oral)   Resp 18   Ht 5' 2" (1.575 m)   Wt 122.4 kg (269 lb 13.5 oz)   SpO2 98%   Breastfeeding No   BMI 49.35 kg/m²   General appearance: alert, cooperative, no distress  HENT: Normocephalic, atraumatic, neck symmetrical, no nasal discharge   Eyes: conjunctivae/corneas clear, PERRL, EOM's intact  Lungs: no labored breathing, symmetric chest wall expansion bilaterally  Heart: regular rate and rhythm without rub; no displacement of the PMI   Abdomen: soft, non-tender; bowel sounds normoactive; no organomegaly  Extremities: extremities symmetric; no clubbing, cyanosis, or edema  Integument: Skin color, texture, turgor normal; no rashes; hair distrubution " normal  Neurologic: Alert and oriented X 3, normal strength, normal coordination and gait  Psychiatric: no pressured speech; normal affect; no evidence of impaired cognition     Labs:  Lab Results   Component Value Date    WBC 16.89 (H) 11/08/2024    HGB 10.2 (L) 11/08/2024    HCT 32.3 (L) 11/08/2024    MCV 78.6 (L) 11/08/2024     11/08/2024       CMP  Sodium   Date Value Ref Range Status   11/08/2024 139 136 - 145 mmol/L Final     Potassium   Date Value Ref Range Status   11/08/2024 4.8 3.5 - 5.1 mmol/L Final     Chloride   Date Value Ref Range Status   11/08/2024 108 (H) 98 - 107 mmol/L Final     CO2   Date Value Ref Range Status   11/08/2024 26 21 - 32 mmol/L Final     Glucose   Date Value Ref Range Status   11/08/2024 80 74 - 106 mg/dL Final     BUN   Date Value Ref Range Status   11/08/2024 5 (L) 7 - 18 mg/dL Final     Creatinine   Date Value Ref Range Status   11/08/2024 0.82 0.55 - 1.02 mg/dL Final     Calcium   Date Value Ref Range Status   11/08/2024 8.6 8.5 - 10.1 mg/dL Final     Total Protein   Date Value Ref Range Status   11/08/2024 5.3 (L) 6.4 - 8.2 g/dL Final     Albumin   Date Value Ref Range Status   11/08/2024 2.1 (L) 3.5 - 5.0 g/dL Final     Bilirubin, Total   Date Value Ref Range Status   11/08/2024 3.9 (H) >0.0 - 1.2 mg/dL Final     Alk Phos   Date Value Ref Range Status   11/08/2024 139 (H) 39 - 100 U/L Final     AST   Date Value Ref Range Status   11/08/2024 27 15 - 37 U/L Final     ALT   Date Value Ref Range Status   11/08/2024 29 13 - 56 U/L Final     Anion Gap   Date Value Ref Range Status   11/08/2024 10 7 - 16 mmol/L Final     eGFR    Date Value Ref Range Status   11/10/2021 121 >=60 mL/min/1.73m² Final     eGFR   Date Value Ref Range Status   03/04/2022 134 >=60 mL/min/1.73m² Final       Imaging:  CT A/P:  1. Nonspecific intraluminal fluid within loops of normal caliber colon, as may be seen the setting of diarrheal illness.  2. Additional details of chronic and  incidental findings, as provided in the body of the report.     Assessment:   Ileal Crohn's disease  Hypoalbuminemia  Leukocytosis  Abnormal LFTs  Anemia, normocytic    Plan:     -Advance diet as tolerates  -Most likely symptoms are related to underlying Crohn's disease given infectious work-up unrevealing  -Unclear whether bilirubin elevation is related to medication use but work-up for autoimmune liver disease in place (ASHLEY, ASMA, AMA pending)  -Would discontinue Imuran for now; will consider starting MTX in the outpatient setting once LFTs normalized  -Will plan to repeat LFTs in outpatient setting this week  -Given Humira 160 mg induction dose from home medication today    Collins Gonsalez MD  [facility]

## 2024-11-09 LAB
BACTERIA BLD CULT: NORMAL
BACTERIA BLD CULT: NORMAL
CALPROTECTIN STL-MCNT: 469 MCG/G
SMOOTH MUSCLE AB SER QL IF: NEGATIVE

## 2024-11-11 ENCOUNTER — TELEPHONE (OUTPATIENT)
Dept: GASTROENTEROLOGY | Facility: CLINIC | Age: 44
End: 2024-11-11
Payer: COMMERCIAL

## 2024-11-11 ENCOUNTER — PATIENT MESSAGE (OUTPATIENT)
Dept: GASTROENTEROLOGY | Facility: CLINIC | Age: 44
End: 2024-11-11
Payer: COMMERCIAL

## 2024-11-11 ENCOUNTER — PATIENT MESSAGE (OUTPATIENT)
Dept: FAMILY MEDICINE | Facility: CLINIC | Age: 44
End: 2024-11-11
Payer: COMMERCIAL

## 2024-11-11 NOTE — ASSESSMENT & PLAN NOTE
Patients blood pressure range in the last 24 hours was: BP  Min: 82/50  Max: 142/84.The patient's inpatient anti-hypertensive regimen is listed below:  Current Antihypertensives       Plan  - BP is controlled, no changes needed to their regimen  - holding home losartan due to hypotension

## 2024-11-11 NOTE — TELEPHONE ENCOUNTER
----- Message from Collins Gonsalez MD sent at 11/11/2024 11:54 AM CST -----  Please notify patient that stool marker of inflammation is elevated. She is scheduled to see Shruthi Winslow in one week to discuss.

## 2024-11-11 NOTE — DISCHARGE SUMMARY
[facility] - [address] [place] [address] Medicine  Discharge Summary      Patient Name: Rosa Rutledge  MRN: [id number]  DORI: [id number]  Patient Class: IP- Inpatient  Admission Date: 11/5/2024  Hospital Length of Stay: 3 days  Discharge Date and Time: 11/8/2024  4:47 PM  Attending Physician: Karis att. providers found   Discharging Provider: Charles Garner Jr, MD  Primary Care Provider: Karis, Primary Doctor    Primary Care Team: Networked reference to record PCT     HPI:   Patient is a 45 yo F presents to [facility] with complaints of abdominal pain and painful urination. Patient was seen in the ED yesterday and was diagnosed with UTI and was sent home on keflex. Patient was seen in clinic today and reported worsening symptoms and was sent back to the ED.     Initial presenting vitals were  Bp 116/74, Spo2 97%, Temp 101.6. initial labs  WBC 14.59, H/H 10.6/34.8, Platelet 325, Na 135, K 2.6, Cl 102, Co2 23, BUN/Cr 10/0.85, lactic acid 2.2. patient was give 1 l NS bolus and started on zosyn in the ED.     This patient will be admitted to [facility] for continued medical management.     * No surgery found *      Hospital Course:   11/06 Records reviewed. 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 Some loose stools without blood or fever. Saw DR Padilla and had colonoscopy with bx and dx crohn's dz.  Placed on imuran waiting on Humira that is now approved. Noted 5 days prior ear ache. Seen PCP and given amoxcil. Following day onset diarrhea. Then started having intermittent rigt sided and pain. Came to ER 11/04 and treated for low K and MG and given IVFs. Discharged but returned to ER with return pain. Temp then 101.6. No chills. No resp complaints. No NV. No dysuria. GB removed in 2000. Also prior hysto with BSO. Some pain still today. Stool studies negative. Talked with Dr Gonsalez.     11/07 Tolerating diet. Sore in abdomen. No  diarrhea. Discussed with GI. To start Humira. Lab reviewed.  11/8 - summary.  44-year-old female with relatively recent diagnosis of ileal Crohn's disease admitted after being seen at clinic with complaints of nausea diarrhea and abdominal pain.  Patient had recently started Imuran but noted no improvement.  She was having fever greater than 101.  CT imaging showed fluid in the gut but no other acute findings.  Patient was treated with IV fluids.  Patient had abnormal potassium and Mag that were replaced.  Stool studies negative for infectious diarrhea.  GI was consulted, was felt that symptom complex was most likely related to her inflammatory bowel disease.  It should be noted that patient did have mildly abnormal LFTs.  On admission total bili 1.2 but elevated to 2.5.  Laboratory studies sent for autoimmune hepatitis which could be associated with her Crohn's.  Prior to admission patient had been approved for Humira but it had not come in yet.  We will in the hospital Humira arrived and patient was administered 1st dose of 160 mg.  She will receive 80 mg in 2 weeks then followed by 40 mg every 2 weeks.  By 11/8 it was felt that patient had met maximum benefit of hospitalization and we will be discharged home.  She will follow up with Shruthi Winslow in GI clinic next week.  Autoimmune workup should be back by that time     Goals of Care Treatment Preferences:  Code Status: Full Code      SDOH Screening:  The patient was screened for utility difficulties, food insecurity, transport difficulties, housing insecurity, and interpersonal safety and there were no concerns identified this admission.     Consults:   Consults (From admission, onward)          Status Ordering Provider     Inpatient consult to Gastroenterology  Once        Provider:  (Not yet assigned)    Completed GISSELLE SIMON            Cardiac/Vascular  Hypertension  Patients blood pressure range in the last 24 hours was: BP  Min: 82/50  Max: 142/84.The  "patient's inpatient anti-hypertensive regimen is listed below:  Current Antihypertensives       Plan  - BP is controlled, no changes needed to their regimen  - holding home losartan due to hypotension        ID  Sepsis  This patient does have evidence of infective focus  My overall impression is sepsis.  Source: Urinary Tract  Antibiotics given-   Antibiotics (72h ago, onward)      None          Latest lactate reviewed-  No results for input(s): "LACTATE", "POCLAC" in the last 72 hours.        Fluid challenge Ideal Body Weight- The patient's ideal body weight is Ideal body weight: 50.1 kg (110 lb 7.2 oz) which will be used to calculate fluid bolus of 30 ml/kg for treatment of septic shock.      Post- resuscitation assessment Yes Perfusion exam was performed within 6 hours of septic shock presentation after bolus shows Adequate tissue perfusion assessed by non-invasive monitoring       Will Not start Pressors-   Source control achieved by: rocephin    11/8- no source of infection found.  Urine culture negative.  Suspect sirs criteria met due to her inflammatory bowel disease.    GI  * Right lower quadrant abdominal pain  Resolved    Nausea vomiting and diarrhea  Resolved        Final Active Diagnoses:    Diagnosis Date Noted POA    PRINCIPAL PROBLEM:  Right lower quadrant abdominal pain [R10.31] 11/06/2024 Yes    Crohn's disease of small intestine with other complication [K50.018] 10/24/2024 Yes    Sepsis [A41.9] 11/05/2024 Yes    Hypokalemia [E87.6] 11/05/2024 Yes    Nausea vomiting and diarrhea [R11.2, R19.7] 11/08/2024 Yes    Urinary tract infection without hematuria [N39.0] 11/05/2024 Yes    Hypertension [I10] 09/07/2021 Yes      Problems Resolved During this Admission:       Discharged Condition: good    Disposition: Home or Self Care    Follow Up:   Follow-up Information       Shruthi Winslow FNP Follow up in 1 week(s).    Specialties: Gastroenterology, Emergency Medicine  Contact information:  [address] " [facility] [address]  [phone]                           Patient Instructions:      Ambulatory referral/consult to Outpatient Case Management   Referral Priority: Routine Referral Type: Consultation   Referral Reason: Specialty Services Required   Number of Visits Requested: 1       Significant Diagnostic Studies: N/A    Pending Diagnostic Studies:       Procedure Component Value Units Date/Time    ASHLEY EIA w/ Reflex to dsDNA/CHONG [[id number]] Collected: 11/07/24 1346    Order Status: Sent Lab Status: In process Updated: 11/07/24 1357    Specimen: Blood     Antimitochondrial Antibody [[id number]] Collected: 11/07/24 1346    Order Status: Sent Lab Status: In process Updated: 11/07/24 1357    Specimen: Blood     EKG 12-lead [[id number]]     Order Status: Sent Lab Status: No result            Medications:  Reconciled Home Medications:      Medication List        START taking these medications      benzonatate 200 MG capsule  Commonly known as: TESSALON  Take 1 capsule (200 mg total) by mouth 3 (three) times daily as needed for Cough.     magnesium glycinate 100 mg Tab  Take 1 tablet by mouth 2 (two) times a day.            CHANGE how you take these medications      potassium chloride 10 MEQ Tbsr  Commonly known as: KLOR-CON  Take 1 tablet (10 mEq total) by mouth 2 (two) times daily.  What changed:   when to take this  Another medication with the same name was removed. Continue taking this medication, and follow the directions you see here.            CONTINUE taking these medications      ergocalciferol 50,000 unit Cap  Commonly known as: ERGOCALCIFEROL  TAKE 1 CAPSULE BY MOUTH TWICE A WEEK     LINZESS 145 mcg Cap capsule  Generic drug: linaCLOtide  Take 2 capsules (290 mcg total) by mouth before breakfast.     neomycin-polymyxin-hydrocortisone 3.5-10,000-1 mg/mL-unit/mL-% otic suspension  Commonly known as: CORTISPORIN  Place 3 drops into the left ear 2 (two) times a day.     NURTEC 75 mg odt  Generic drug:  rimegepant  DISSOLVE 1 TABLET ON THE TONGUE AT ONSET HEADACHE     PULMICORT FLEXHALER 180 mcg/actuation Aepb  Generic drug: budesonide 180mcg  Inhale 2 puffs into the lungs every 4 to 6 hours as needed.            STOP taking these medications      azaTHIOprine 100 mg tablet  Commonly known as: IMURAN     cephALEXin 500 MG capsule  Commonly known as: KEFLEX     fluconazole 150 MG Tab  Commonly known as: DIFLUCAN     losartan 50 MG tablet  Commonly known as: COZAAR     magnesium oxide 400 mg magnesium Tab            ASK your doctor about these medications      fluconazole 150 MG Tab  Commonly known as: DIFLUCAN  Take 1 tablet (150 mg total) by mouth once daily. for 1 day  Ask about: Should I take this medication?     * HUMIRA(CF) [facility] 80 mg/0.8 mL Pnkt  Generic drug: adalimumab  160 mg (given over 1 or 2 days), then 80 mg 2 weeks later on day 15.     * HUMIRA PEN Pnkt injection  Generic drug: adalimumab  Inject 1 pen  (40 mg total) into the skin every 14 (fourteen) days.           * This list has 2 medication(s) that are the same as other medications prescribed for you. Read the directions carefully, and ask your doctor or other care provider to review them with you.                  Indwelling Lines/Drains at time of discharge:   Lines/Drains/Airways       None                   Time spent on the discharge of patient: 35 minutes         Charles Garner Jr, MD  Department of Hospital Medicine  [facility] - [address] [place] [address]

## 2024-11-11 NOTE — ASSESSMENT & PLAN NOTE
"This patient does have evidence of infective focus  My overall impression is sepsis.  Source: Urinary Tract  Antibiotics given-   Antibiotics (72h ago, onward)      None          Latest lactate reviewed-  No results for input(s): "LACTATE", "POCLAC" in the last 72 hours.        Fluid challenge Ideal Body Weight- The patient's ideal body weight is Ideal body weight: 50.1 kg (110 lb 7.2 oz) which will be used to calculate fluid bolus of 30 ml/kg for treatment of septic shock.      Post- resuscitation assessment Yes Perfusion exam was performed within 6 hours of septic shock presentation after bolus shows Adequate tissue perfusion assessed by non-invasive monitoring       Will Not start Pressors-   Source control achieved by: rocephin    11/8- no source of infection found.  Urine culture negative.  Suspect sirs criteria met due to her inflammatory bowel disease.  "

## 2024-11-11 NOTE — PLAN OF CARE
[facility] Telemetry  Discharge Final Note    Primary Care Provider: No, Primary Doctor    Expected Discharge Date: 11/8/2024    Final Discharge Note (most recent)       Final Note - 11/11/24 0820          Final Note    Assessment Type Final Discharge Note     Anticipated Discharge Disposition Home or Self Care     What phone number can be called within the next 1-3 days to see how you are doing after discharge? [phone]        Post-Acute Status    Coverage [employer]     Discharge Delays None known at this time                   Contact Info       Shruthi Winslow FNP   Specialty: Gastroenterology, Emergency Medicine    [address] [place] [id number]   Phone: [phone]       Next Steps: Follow up in 1 week(s)          Pt dc home with family. No further needs noted

## 2024-11-12 ENCOUNTER — OUTPATIENT CASE MANAGEMENT (OUTPATIENT)
Dept: ADMINISTRATIVE | Facility: OTHER | Age: 44
End: 2024-11-12
Payer: COMMERCIAL

## 2024-11-12 LAB
ANA SER QL: POSITIVE
DSDNA IGG SERPL IA-ACNC: 10 IU (ref 0–24)
DSDNA IGG SERPL IA-ACNC: NEGATIVE [IU]/ML
ENA AB SER QL IA: 1.6 EUS (ref 0–19.9)
ENA AB SER QL IA: NEGATIVE

## 2024-11-14 LAB
ANTI-MITOCHONDRIAL (M2) AB INDEX: 0.04
HGB FRACT BLD ELPH-IMP: NEGATIVE

## 2024-11-18 ENCOUNTER — PATIENT MESSAGE (OUTPATIENT)
Dept: GASTROENTEROLOGY | Facility: CLINIC | Age: 44
End: 2024-11-18
Payer: COMMERCIAL

## 2024-11-18 ENCOUNTER — OFFICE VISIT (OUTPATIENT)
Dept: GASTROENTEROLOGY | Facility: CLINIC | Age: 44
End: 2024-11-18
Payer: COMMERCIAL

## 2024-11-18 VITALS
HEART RATE: 80 BPM | HEIGHT: 62 IN | WEIGHT: 262.19 LBS | SYSTOLIC BLOOD PRESSURE: 135 MMHG | BODY MASS INDEX: 48.25 KG/M2 | DIASTOLIC BLOOD PRESSURE: 98 MMHG | OXYGEN SATURATION: 97 %

## 2024-11-18 DIAGNOSIS — K50.018 CROHN'S DISEASE OF SMALL INTESTINE WITH OTHER COMPLICATION: Primary | ICD-10-CM

## 2024-11-18 PROCEDURE — 99214 OFFICE O/P EST MOD 30 MIN: CPT | Mod: PBBFAC

## 2024-11-18 PROCEDURE — 3075F SYST BP GE 130 - 139MM HG: CPT | Mod: ,,,

## 2024-11-18 PROCEDURE — 3008F BODY MASS INDEX DOCD: CPT | Mod: ,,,

## 2024-11-18 PROCEDURE — 4010F ACE/ARB THERAPY RXD/TAKEN: CPT | Mod: ,,,

## 2024-11-18 PROCEDURE — 99215 OFFICE O/P EST HI 40 MIN: CPT | Mod: S$PBB,,,

## 2024-11-18 PROCEDURE — 99999 PR PBB SHADOW E&M-EST. PATIENT-LVL IV: CPT | Mod: PBBFAC,,,

## 2024-11-18 PROCEDURE — 1159F MED LIST DOCD IN RCRD: CPT | Mod: ,,,

## 2024-11-18 PROCEDURE — 1111F DSCHRG MED/CURRENT MED MERGE: CPT | Mod: ,,,

## 2024-11-18 PROCEDURE — 3080F DIAST BP >= 90 MM HG: CPT | Mod: ,,,

## 2024-11-18 RX ORDER — TRIAMCINOLONE ACETONIDE 1 MG/G
PASTE DENTAL
COMMUNITY
Start: 2024-11-13

## 2024-11-18 RX ORDER — FLUCONAZOLE 150 MG/1
150 TABLET ORAL
COMMUNITY
Start: 2024-11-14

## 2024-11-18 NOTE — PROGRESS NOTES
Gastroenterology Clinic Note    Patient ID: [id number]   Referring MD: Shruthi Winslow FNP   Chief Complaint:   Chief Complaint   Patient presents with    Follow-up     HFU       History of Present Illness     Rosa Rutledge is an 44 y.o.  female who is referred for follow-up of Crohn's Disease. Diagnosis was made by colonoscopy. Onset was 2024. Disease has involved terminal ileum. The patient has had no surgery for treatment of their IBD. This patient does not have a family history of IBD or colon cancer.      This historical paragraph has been reviewed and updated as necessary from prior clinic notes.     Following our previous office visit, patient was started on Imuran.  She then developed AOM and was started on Amoxicillin for treatment.  Labs one week after initiation of Imuran revealed an increased WBC of 14.22 with normal LFTs.  Patient began experiencing N/V/D with severe abdominal pain and ended up in the ED 11/04/24.  She was treated for UTI with no acute abnormalities on CT and then followed up with her PCP next day.  She was experiencing dizziness and lightheadedness with htn so she returned to ED where she was admitted.  Reviewed hospital discharge summary report from 11/10/24.  Dr. Gonsalez was GI on-call.  Infectious stool studies were unremarkable but calprotectin was 450s.  Dr. Gonsalez stopped the Imuran and patient was able to get initial Humira loading dose 11/08/24.  She is feeling better at present.  She had two semisolid stools yesterday with no hematochezia or melena.  N/V has improved.  The patient currently denies significant abdominal pain or discomfort.  The patient does not have fever and chills.  The patient does not have night sweats and nocturnal awakening.  The patient does not have weight loss.  The patient does not have extraintestinal symptoms of oral ulcers, joint pain, and rash.     Last colonoscopy was 10/11/2024 with severe erythematous, ulcerated mucosa  with erosion in the terminal ileum, consistent with Crohn's disease     Final Diagnosis   A. Terminal ileum, biopsies:  - Moderately active chronic ileitis  - No granulomas identified       The patient has not been on corticosteroids > or = 10mg prednisone (or equivalent) for 60 or more days.    The patient has been treated with the following medications for IBD:  Imuran    Patient's current therapy is:  Humira    Review of Systems   Constitutional:  Negative for weight loss.   Gastrointestinal:  Positive for abdominal pain, constipation and diarrhea. Negative for blood in stool, heartburn, melena, nausea and vomiting.       Past Medical History      Past Medical History:   Diagnosis Date    Asthma     COVID     Hypertension     Kidney stone        Past Surgical History     Past Surgical History:   Procedure Laterality Date    ADENOIDECTOMY      BREAST SURGERY Bilateral     reduction    CHOLECYSTECTOMY  11/12/2020    CYSTOURETEROSCOPY, WITH HOLMIUM LASER LITHOTRIPSY OF URETERAL CALCULUS AND STENT INSERTION Right 5/16/2024    Procedure: CYSTOURETEROSCOPY WITH HOLMIUM LASER LITHOTRIPSY OF URETERAL CALCULUS, STENT INSERTION AND INDICATED PROCEDURES;  Surgeon: Luc Howard Jr., MD;  Location: [facility];  Service: Urology;  Laterality: Right;    HYSTERECTOMY  2016    Full    REDUCTION OF BOTH BREASTS  01/09/2020    TONSILLECTOMY      TOTAL REDUCTION MAMMOPLASTY      TUBAL LIGATION         Allergies   Review of patient's allergies indicates:  No Known Allergies    Immunization History     Immunization History   Administered Date(s) Administered    COVID-19 Vaccine 01/11/2021, 02/08/2021    COVID-19, MRNA, LN-S, PF (MODERNA FULL 0.5 ML DOSE) 11/17/2021    Influenza 12/16/2021    Influenza - Quadrivalent - PF *Preferred* (6 months and older) 10/21/2022, 10/27/2023    Influenza - Trivalent - Fluarix, Flulaval, Fluzone, Afluria - PF 10/09/2024       Past Family History      Family History   Problem Relation Name Age of  Onset    Diabetes Mother      Hypertension Mother      Diabetes Maternal Grandmother      Hypertension Maternal Grandmother      Asthma Maternal Grandmother      Asthma Sister      Hypertension Sister         Past Social History      Social History     Socioeconomic History    Marital status:     Number of children: 2   Occupational History    Occupation: Sunglass      Employer: [employer]   Tobacco Use    Smoking status: Never    Smokeless tobacco: Never   Substance and Sexual Activity    Alcohol use: Never    Drug use: Never    Sexual activity: Yes     Partners: Male   Social History Narrative    Lives at home with  and second child (13)    No smoking in home , No smoking in home      Social Drivers of Health     Financial Resource Strain: Low Risk  (11/6/2024)    Overall Financial Resource Strain (CARDIA)     Difficulty of Paying Living Expenses: Not hard at all   Food Insecurity: No Food Insecurity (11/6/2024)    Hunger Vital Sign     Worried About Running Out of Food in the Last Year: Never true     Ran Out of Food in the Last Year: Never true   Transportation Needs: No Transportation Needs (11/6/2024)    TRANSPORTATION NEEDS     Transportation : No   Physical Activity: Insufficiently Active (11/6/2024)    Exercise Vital Sign     Days of Exercise per Week: 3 days     Minutes of Exercise per Session: 20 min   Stress: No Stress Concern Present (11/6/2024)    [employer] - Occupational Stress Questionnaire     Feeling of Stress : Only a little   Housing Stability: Low Risk  (11/6/2024)    Housing Stability Vital Sign     Unable to Pay for Housing in the Last Year: No     Homeless in the Last Year: No       Current Medications     Outpatient Medications Marked as Taking for the 11/18/24 encounter (Office Visit) with Shruthi Winslow FNP   Medication Sig Dispense Refill    benzonatate (TESSALON) 200 MG capsule Take 1 capsule (200 mg total) by mouth 3 (three) times daily as needed for  "Cough. 30 capsule 1    budesonide 180mcg (PULMICORT FLEXHALER) 180 mcg/actuation AePB Inhale 2 puffs into the lungs every 4 to 6 hours as needed.      ergocalciferol (ERGOCALCIFEROL) 50,000 unit Cap TAKE 1 CAPSULE BY MOUTH TWICE A WEEK 8 capsule 0    fluconazole (DIFLUCAN) 150 MG Tab Take 150 mg by mouth. for three days      LINZESS 145 mcg Cap capsule Take 2 capsules (290 mcg total) by mouth before breakfast. 180 capsule 3    magnesium glycinate 100 mg Tab Take 1 tablet by mouth 2 (two) times a day.      neomycin-polymyxin-hydrocortisone (CORTISPORIN) 3.5-10,000-1 mg/mL-unit/mL-% otic suspension Place 3 drops into the left ear 2 (two) times a day. 10 mL 0    NURTEC 75 mg odt DISSOLVE 1 TABLET ON THE TONGUE AT ONSET HEADACHE 16 tablet 2    potassium chloride (KLOR-CON) 10 MEQ TbSR Take 1 tablet (10 mEq total) by mouth 2 (two) times daily. 90 tablet 3    triamcinolone acetonide 0.1% (KENALOG) 0.1 % paste APPLY SMALL AMOUNT TO AFFECTED AREA IN THE MOUTH THREE TIMES DAILY          I have reviewed the current medications, allergies, vital signs, past medical and surgical history, family medical history, and social history for this encounter and agree with all findings.    OBJECTIVE    Physical Exam    BP (!) 135/98   Pulse 80   Ht 5' 2" (1.575 m)   Wt 118.9 kg (262 lb 3.2 oz)   SpO2 97%   BMI 47.96 kg/m²   GEN: Well appearing, cooperative, NAD  NECK: Supple, no LAD  CV: Normal rate  RESP: Unlabored  ABD: ND, no guarding  EXT: No clubbing, cyanosis, or edema  SKIN: Warm and dry  NEURO: AAO x4.     LABS    CBC (with or without Differential):   Lab Results   Component Value Date    WBC 16.89 (H) 11/08/2024    HGB 10.2 (L) 11/08/2024    HCT 32.3 (L) 11/08/2024    MCV 78.6 (L) 11/08/2024    MCH 24.8 (L) 11/08/2024    MCHC 31.6 (L) 11/08/2024    RDW 14.5 11/08/2024     11/08/2024    MPV 11.2 11/08/2024    NEUTOPHILPCT 86.9 (H) 11/08/2024    DIFFTYPE Auto 11/08/2024     BMP/CMP:   Lab Results   Component Value Date "     11/08/2024    K 4.8 11/08/2024     (H) 11/08/2024    CO2 26 11/08/2024    BUN 5 (L) 11/08/2024    CREATININE 0.82 11/08/2024    GLU 80 11/08/2024    CALCIUM 8.6 11/08/2024    ALBUMIN 2.1 (L) 11/08/2024    AST 27 11/08/2024    ALT 29 11/08/2024    ALKPHOS 139 (H) 11/08/2024    MG 2.0 11/08/2024        IMAGING  CT abdomen pelvis with IV contrast 11/2024  1. Nonspecific intraluminal fluid within loops of normal caliber colon, as may be seen the setting of diarrheal illness.  2. Additional details of chronic and incidental findings, as provided in the body of the report.    ASSESSMENT  Rosa Rutledge is a 44 y.o. AAF with history of chronic constipation and hypertension who is referred for newly diagnosed Crohn's disease.    1. Crohn's disease of small intestine with other complication           PLAN    - continue Humira as monotherapy; d/w Dr. Gonsalez and we will not restart Imuran right now   - repeat labs (CBC and LFTs) along with calprotectin today  - repeat colonoscopy to assess mucosal healing in 6 months after initiation of therapy  - return to GI clinic for follow-up in 4-6 weeks      I have counseled the patient on the risks of Humira TNFa including serious infections (TB, bacterial, fungal, opportunistic, etc) due to immunosuppression, malignancies such as Lymphomas (HL, NHL, HSTCL), demyelinating disease, lupus like syndrome, dermatologic reactions (psoriasiform eruption), infusion reactions, New/worsening of heart failure, Hep B/TB reactivation, Hepatotoxicity. Avoid TNFa use in patients with demyelinating disease (MS, optic neuritis, GBS, CIDP, myelitis etc), heart failure, HIV, Seizure disorders, h/o transplant.         I have reviewed the IBD health maintenance as below.      IBD Health Maintenance:  -Prevnar 13: Recommend   -Pneumovax 23: Recommend 8 weeks after Prevnar 13  -Flu Shot: Recommend annually  -Shingrix: Recommend    -Hepatitis A/Hepatitis B: Check antibody and if not  immune will vaccinate  -Annual pap smear: Recommend    -Annual skin exam: Recommend   -Colon cancer screening: Colonoscopy:  every 1-2 years for surveillance once in endoscopic remission  -DEXA scan: Recommend  -Tobacco: Avoid  -Should avoid NSAIDs and narcotics, use only Tylenol for pain relief.     There are no Patient Instructions on file for this visit.      Orders Placed This Encounter   Procedures    Calprotectin, Stool     Standing Status:   Future     Number of Occurrences:   1     Standing Expiration Date:   1/18/2026     Order Specific Question:   Send normal result to authorizing provider's In Basket if patient is active on MyChart:     Answer:   Yes    CBC Auto Differential     Standing Status:   Future     Standing Expiration Date:   1/18/2026    Comprehensive Metabolic Panel     Standing Status:   Future     Standing Expiration Date:   1/18/2026    CRP, High Sensitivity     Standing Status:   Future     Standing Expiration Date:   2/16/2026     Order Specific Question:   Send normal result to authorizing provider's In Basket if patient is active on MyChart:     Answer:   Yes         The risks and benefits of my recommendations, as well as other treatment options were discussed with the patient today. All questions were answered.    40 minutes of total time spent on the encounter, which includes face to face time and non-face to face time preparing to see the patient (eg, review of tests), obtaining and/or reviewing separately obtained history, documenting clinical information in the electronic or other health record, Independently interpreting results (not separately reported) and communicating results to the patient/family/caregiver, or care coordination (not separately reported).        Shruthi Winslow, GEREMIASP/ACNP  [facility]

## 2024-11-19 DIAGNOSIS — E66.9 OBESITY, UNSPECIFIED CLASSIFICATION, UNSPECIFIED OBESITY TYPE, UNSPECIFIED WHETHER SERIOUS COMORBIDITY PRESENT: ICD-10-CM

## 2024-11-19 RX ORDER — SEMAGLUTIDE 1 MG/.5ML
1 INJECTION, SOLUTION SUBCUTANEOUS
Qty: 2 ML | Refills: 0 | Status: ACTIVE | OUTPATIENT
Start: 2024-11-19

## 2024-11-25 ENCOUNTER — TELEPHONE (OUTPATIENT)
Dept: GASTROENTEROLOGY | Facility: CLINIC | Age: 44
End: 2024-11-25
Payer: COMMERCIAL

## 2024-11-25 NOTE — TELEPHONE ENCOUNTER
----- Message from ANGELINA Morales sent at 11/21/2024  4:30 PM CST -----  Calprotectin is improved

## 2024-12-11 ENCOUNTER — TELEPHONE (OUTPATIENT)
Dept: ORTHOPEDICS | Facility: CLINIC | Age: 44
End: 2024-12-11
Payer: COMMERCIAL

## 2024-12-11 NOTE — TELEPHONE ENCOUNTER
----- Message from Luz sent at 12/11/2024  9:09 AM CST -----  Regarding: sooner appointment  Who Called: Rosa Ford Maty    Caller is requesting a sooner appointment. Caller declined first available appointment listed below. Caller will not accept being placed on the waitlist and is requesting a message be sent to doctor.    When is the first available appointment?DEC 30  Options offered (Virtual Visit, Urgent Care): n/a  Symptoms:LT knee pain       Preferred Method of Contact: Phone Call  Patient's Preferred Phone Number on File: [phone]   Best Call Back Number, if different:  Additional Information:

## 2024-12-16 ENCOUNTER — OFFICE VISIT (OUTPATIENT)
Dept: ORTHOPEDICS | Facility: CLINIC | Age: 44
End: 2024-12-16
Payer: COMMERCIAL

## 2024-12-16 ENCOUNTER — HOSPITAL ENCOUNTER (OUTPATIENT)
Dept: RADIOLOGY | Facility: HOSPITAL | Age: 44
Discharge: HOME | End: 2024-12-16
Attending: ORTHOPAEDIC SURGERY
Payer: COMMERCIAL

## 2024-12-16 VITALS
HEIGHT: 62 IN | BODY MASS INDEX: 48.51 KG/M2 | WEIGHT: 263.63 LBS | SYSTOLIC BLOOD PRESSURE: 171 MMHG | DIASTOLIC BLOOD PRESSURE: 92 MMHG | OXYGEN SATURATION: 98 % | HEART RATE: 78 BPM

## 2024-12-16 DIAGNOSIS — M25.562 LEFT KNEE PAIN, UNSPECIFIED CHRONICITY: ICD-10-CM

## 2024-12-16 DIAGNOSIS — M25.562 LEFT KNEE PAIN, UNSPECIFIED CHRONICITY: Primary | ICD-10-CM

## 2024-12-16 PROCEDURE — 1159F MED LIST DOCD IN RCRD: CPT | Mod: ,,, | Performed by: ORTHOPAEDIC SURGERY

## 2024-12-16 PROCEDURE — 20610 DRAIN/INJ JOINT/BURSA W/O US: CPT | Mod: PBBFAC,LT | Performed by: ORTHOPAEDIC SURGERY

## 2024-12-16 PROCEDURE — 3008F BODY MASS INDEX DOCD: CPT | Mod: ,,, | Performed by: ORTHOPAEDIC SURGERY

## 2024-12-16 PROCEDURE — 4010F ACE/ARB THERAPY RXD/TAKEN: CPT | Mod: ,,, | Performed by: ORTHOPAEDIC SURGERY

## 2024-12-16 PROCEDURE — 3080F DIAST BP >= 90 MM HG: CPT | Mod: ,,, | Performed by: ORTHOPAEDIC SURGERY

## 2024-12-16 PROCEDURE — 99213 OFFICE O/P EST LOW 20 MIN: CPT | Mod: S$PBB,25,, | Performed by: ORTHOPAEDIC SURGERY

## 2024-12-16 PROCEDURE — 73564 X-RAY EXAM KNEE 4 OR MORE: CPT | Mod: TC,LT

## 2024-12-16 PROCEDURE — 3077F SYST BP >= 140 MM HG: CPT | Mod: ,,, | Performed by: ORTHOPAEDIC SURGERY

## 2024-12-16 PROCEDURE — 99999 PR PBB SHADOW E&M-EST. PATIENT-LVL IV: CPT | Mod: PBBFAC,,, | Performed by: ORTHOPAEDIC SURGERY

## 2024-12-16 PROCEDURE — 99999PBSHW PR PBB SHADOW TECHNICAL ONLY FILED TO HB: Mod: PBBFAC,,,

## 2024-12-16 PROCEDURE — 99214 OFFICE O/P EST MOD 30 MIN: CPT | Mod: PBBFAC,25 | Performed by: ORTHOPAEDIC SURGERY

## 2024-12-16 RX ORDER — TRIAMCINOLONE ACETONIDE 40 MG/ML
40 INJECTION, SUSPENSION INTRA-ARTICULAR; INTRAMUSCULAR
Status: DISCONTINUED | OUTPATIENT
Start: 2024-12-16 | End: 2024-12-16 | Stop reason: HOSPADM

## 2024-12-16 RX ORDER — BUPIVACAINE HYDROCHLORIDE 2.5 MG/ML
1 INJECTION, SOLUTION EPIDURAL; INFILTRATION; INTRACAUDAL
Status: DISCONTINUED | OUTPATIENT
Start: 2024-12-16 | End: 2024-12-16 | Stop reason: HOSPADM

## 2024-12-16 RX ADMIN — TRIAMCINOLONE ACETONIDE 40 MG: 40 INJECTION, SUSPENSION INTRA-ARTICULAR; INTRAMUSCULAR at 01:12

## 2024-12-16 RX ADMIN — BUPIVACAINE HYDROCHLORIDE 1 ML: 2.5 INJECTION, SOLUTION EPIDURAL; INFILTRATION; INTRACAUDAL at 01:12

## 2024-12-16 NOTE — PROCEDURES
Large Joint Aspiration/Injection: L knee    Date/Time: 12/16/2024 1:40 PM    Performed by: Dontae Downing MD  Authorized by: Dontae Downing MD    Consent Done?:  Yes (Verbal)  Indications:  Arthritis    Details:  Needle Size:  22 G  Ultrasonic Guidance for needle placement?: No    Approach:  Anterolateral  Location:  Knee  Site:  L knee  Medications:  1 mL BUPivacaine (PF) 0.25% (2.5 mg/ml) 0.25 % (2.5 mg/mL); 40 mg triamcinolone acetonide 40 mg/mL  Patient tolerance:  Patient tolerated the procedure well with no immediate complications

## 2024-12-16 NOTE — PROGRESS NOTES
Patient is here for arthritic changes of the left knee.  Previous injection helped with the pain.  I injected her left knee 1 cc Marcaine 1 cc Kenalog.  I will recheck her in 3 months.

## 2024-12-17 ENCOUNTER — PATIENT MESSAGE (OUTPATIENT)
Dept: FAMILY MEDICINE | Facility: CLINIC | Age: 44
End: 2024-12-17
Payer: COMMERCIAL

## 2024-12-17 ENCOUNTER — HOSPITAL ENCOUNTER (OUTPATIENT)
Dept: RADIOLOGY | Facility: HOSPITAL | Age: 44
Discharge: HOME | End: 2024-12-17
Attending: RADIOLOGY
Payer: COMMERCIAL

## 2024-12-17 DIAGNOSIS — Z12.31 VISIT FOR SCREENING MAMMOGRAM: ICD-10-CM

## 2024-12-17 PROCEDURE — 77063 BREAST TOMOSYNTHESIS BI: CPT | Mod: 26,,, | Performed by: RADIOLOGY

## 2024-12-17 PROCEDURE — 77063 BREAST TOMOSYNTHESIS BI: CPT | Mod: TC

## 2024-12-17 PROCEDURE — 77067 SCR MAMMO BI INCL CAD: CPT | Mod: 26,,, | Performed by: RADIOLOGY

## 2024-12-17 RX ORDER — FLUCONAZOLE 150 MG/1
150 TABLET ORAL
Qty: 1 TABLET | Refills: 3 | Status: SHIPPED | OUTPATIENT
Start: 2024-12-17 | End: 2024-12-24

## 2024-12-24 DIAGNOSIS — E66.9 OBESITY, UNSPECIFIED CLASSIFICATION, UNSPECIFIED OBESITY TYPE, UNSPECIFIED WHETHER SERIOUS COMORBIDITY PRESENT: ICD-10-CM

## 2024-12-30 RX ORDER — SEMAGLUTIDE 1 MG/.5ML
1 INJECTION, SOLUTION SUBCUTANEOUS
Qty: 2 ML | Refills: 0 | Status: ACTIVE | OUTPATIENT
Start: 2024-12-30

## 2025-01-02 DIAGNOSIS — K59.04 CHRONIC IDIOPATHIC CONSTIPATION: ICD-10-CM

## 2025-01-02 DIAGNOSIS — K50.018 CROHN'S DISEASE OF SMALL INTESTINE WITH OTHER COMPLICATION: ICD-10-CM

## 2025-01-02 DIAGNOSIS — K58.1 IRRITABLE BOWEL SYNDROME WITH CONSTIPATION: Primary | ICD-10-CM

## 2025-01-02 NOTE — TELEPHONE ENCOUNTER
Spoke with patient and she stated that pharmacy told her that for her insurance to pay for the Linzess it needed to be sent for the 29mcg instead of the taking 2 of the 145mcg.

## 2025-01-02 NOTE — TELEPHONE ENCOUNTER
----- Message from Nuris sent at 1/2/2025 11:45 AM CST -----  Asked nurse to call about linzess the dosage insurance not paying, [id number]

## 2025-01-06 ENCOUNTER — OFFICE VISIT (OUTPATIENT)
Dept: GASTROENTEROLOGY | Facility: CLINIC | Age: 45
End: 2025-01-06
Payer: COMMERCIAL

## 2025-01-06 VITALS
HEART RATE: 91 BPM | OXYGEN SATURATION: 100 % | HEIGHT: 62 IN | BODY MASS INDEX: 48.65 KG/M2 | SYSTOLIC BLOOD PRESSURE: 155 MMHG | WEIGHT: 264.38 LBS | DIASTOLIC BLOOD PRESSURE: 92 MMHG

## 2025-01-06 DIAGNOSIS — K50.018 CROHN'S DISEASE OF SMALL INTESTINE WITH OTHER COMPLICATION: Primary | ICD-10-CM

## 2025-01-06 PROCEDURE — 99214 OFFICE O/P EST MOD 30 MIN: CPT | Mod: PBBFAC

## 2025-01-06 PROCEDURE — 3079F DIAST BP 80-89 MM HG: CPT | Mod: ,,,

## 2025-01-06 PROCEDURE — 99999 PR PBB SHADOW E&M-EST. PATIENT-LVL IV: CPT | Mod: PBBFAC,,,

## 2025-01-06 PROCEDURE — 99214 OFFICE O/P EST MOD 30 MIN: CPT | Mod: S$PBB,,,

## 2025-01-06 PROCEDURE — 3077F SYST BP >= 140 MM HG: CPT | Mod: ,,,

## 2025-01-06 PROCEDURE — 3008F BODY MASS INDEX DOCD: CPT | Mod: ,,,

## 2025-01-06 PROCEDURE — 3044F HG A1C LEVEL LT 7.0%: CPT | Mod: ,,,

## 2025-01-08 ENCOUNTER — OFFICE VISIT (OUTPATIENT)
Dept: NEUROLOGY | Facility: CLINIC | Age: 45
End: 2025-01-08
Payer: COMMERCIAL

## 2025-01-08 ENCOUNTER — TELEPHONE (OUTPATIENT)
Dept: OBSTETRICS AND GYNECOLOGY | Facility: CLINIC | Age: 45
End: 2025-01-08
Payer: COMMERCIAL

## 2025-01-08 VITALS
DIASTOLIC BLOOD PRESSURE: 91 MMHG | RESPIRATION RATE: 18 BRPM | HEIGHT: 62 IN | SYSTOLIC BLOOD PRESSURE: 133 MMHG | WEIGHT: 262 LBS | BODY MASS INDEX: 48.21 KG/M2 | HEART RATE: 82 BPM | OXYGEN SATURATION: 99 %

## 2025-01-08 DIAGNOSIS — G43.119 INTRACTABLE MIGRAINE WITH AURA WITHOUT STATUS MIGRAINOSUS: Primary | ICD-10-CM

## 2025-01-08 DIAGNOSIS — M83.9 VITAMIN D DEFICIENT OSTEOMALACIA: ICD-10-CM

## 2025-01-08 DIAGNOSIS — R11.0 NAUSEA: ICD-10-CM

## 2025-01-08 PROCEDURE — 3008F BODY MASS INDEX DOCD: CPT | Mod: ,,, | Performed by: NURSE PRACTITIONER

## 2025-01-08 PROCEDURE — 3080F DIAST BP >= 90 MM HG: CPT | Mod: ,,, | Performed by: NURSE PRACTITIONER

## 2025-01-08 PROCEDURE — 99999 PR PBB SHADOW E&M-EST. PATIENT-LVL IV: CPT | Mod: PBBFAC,,, | Performed by: NURSE PRACTITIONER

## 2025-01-08 PROCEDURE — 1160F RVW MEDS BY RX/DR IN RCRD: CPT | Mod: ,,, | Performed by: NURSE PRACTITIONER

## 2025-01-08 PROCEDURE — 3075F SYST BP GE 130 - 139MM HG: CPT | Mod: ,,, | Performed by: NURSE PRACTITIONER

## 2025-01-08 PROCEDURE — 99213 OFFICE O/P EST LOW 20 MIN: CPT | Mod: S$PBB,,, | Performed by: NURSE PRACTITIONER

## 2025-01-08 PROCEDURE — 99214 OFFICE O/P EST MOD 30 MIN: CPT | Mod: PBBFAC | Performed by: NURSE PRACTITIONER

## 2025-01-08 PROCEDURE — 1159F MED LIST DOCD IN RCRD: CPT | Mod: ,,, | Performed by: NURSE PRACTITIONER

## 2025-01-08 RX ORDER — PROMETHAZINE HYDROCHLORIDE 25 MG/1
TABLET ORAL
Qty: 20 TABLET | Refills: 2 | Status: SHIPPED | OUTPATIENT
Start: 2025-01-08

## 2025-01-08 RX ORDER — GALCANEZUMAB 120 MG/ML
120 INJECTION, SOLUTION SUBCUTANEOUS
Qty: 1 ML | Refills: 11 | Status: SHIPPED | OUTPATIENT
Start: 2025-01-08

## 2025-01-08 RX ORDER — ERGOCALCIFEROL 1.25 MG/1
50000 CAPSULE ORAL
Qty: 3 CAPSULE | Refills: 3 | Status: SHIPPED | OUTPATIENT
Start: 2025-01-08

## 2025-01-08 RX ORDER — RIMEGEPANT SULFATE 75 MG/75MG
TABLET, ORALLY DISINTEGRATING ORAL
Qty: 16 TABLET | Refills: 2 | Status: SHIPPED | OUTPATIENT
Start: 2025-01-08

## 2025-01-08 NOTE — PATIENT INSTRUCTIONS
1. Continue Emgality once monthly injections    2. Continue phenergan and maxalt PRN    3. Continue the Nurtec as directed

## 2025-01-08 NOTE — PROGRESS NOTES
Subjective:       Patient ID: Rosa Rutledge is a 44 y.o. female     Chief Complaint:    Chief Complaint   Patient presents with    Follow-up        Allergies:  Patient has no known allergies.    Current Medications:    Outpatient Encounter Medications as of 1/8/2025   Medication Sig Dispense Refill    budesonide 180mcg (PULMICORT FLEXHALER) 180 mcg/actuation AePB Inhale 2 puffs into the lungs every 4 to 6 hours as needed.      linaCLOtide (LINZESS) 290 mcg Cap capsule Take 1 capsule (290 mcg total) by mouth before breakfast. 90 capsule 3    magnesium glycinate 100 mg Tab Take 1 tablet by mouth 2 (two) times a day.      neomycin-polymyxin-hydrocortisone (CORTISPORIN) 3.5-10,000-1 mg/mL-unit/mL-% otic suspension Place 3 drops into the left ear 2 (two) times a day. 10 mL 0    potassium chloride (KLOR-CON) 10 MEQ TbSR Take 1 tablet (10 mEq total) by mouth 2 (two) times daily. 90 tablet 3    semaglutide, weight loss, (WEGOVY) 1 mg/0.5 mL PnIj Inject 1 mg into the skin every 7 days. 2 mL 0    triamcinolone acetonide 0.1% (KENALOG) 0.1 % paste APPLY SMALL AMOUNT TO AFFECTED AREA IN THE MOUTH THREE TIMES DAILY      [DISCONTINUED] ergocalciferol (ERGOCALCIFEROL) 50,000 unit Cap TAKE 1 CAPSULE BY MOUTH TWICE A WEEK 8 capsule 0    [DISCONTINUED] NURTEC 75 mg odt DISSOLVE 1 TABLET ON THE TONGUE AT ONSET HEADACHE 16 tablet 2    adalimumab (HUMIRA PEN) PnKt injection Inject 1 pen  (40 mg total) into the skin every 14 (fourteen) days. (Patient not taking: Reported on 1/8/2025) 2 pen 11    ergocalciferol (ERGOCALCIFEROL) 50,000 unit Cap Take 1 capsule (50,000 Units total) by mouth every 30 days. 3 capsule 3    galcanezumab-gnlm (EMGALITY PEN) 120 mg/mL PnIj Inject 1 mL (120 mg total) into the skin every 28 days. 1 each 11    NURTEC 75 mg odt DISSOLVE 1 TABLET ON THE TONGUE AT ONSET HEADACHE 16 tablet 2    promethazine (PHENERGAN) 25 MG tablet Take 1 tablet every 8 hours as needed for migraine.  Not more than 3 days of the  week 20 tablet 2     No facility-administered encounter medications on file as of 1/8/2025.       History of Present Illness  44 yr old A.A. right handed female presents with headaches.    Prior Trokendi XR was not effective, though it did help with the intensity.  She was still experiencing about 8 migraines a month.  Rated 6/10, bilateral frontal with photophobia and nausea and vomiting.    She is doing very well on the Emgality once monthly and denies any complications.  On maxalt and phenergan to use PRN, though maxalt in the past was not as effective and will see about nurtec as abortive if insurance will allow.  Prior relpax was not effective.    Vitamin D level in November was 50.           Review of Systems  Review of Systems   Constitutional:  Negative for diaphoresis and fever.   HENT:  Negative for congestion, hearing loss and tinnitus.    Eyes:  Negative for blurred vision, double vision, photophobia, discharge and redness.   Respiratory:  Negative for cough and shortness of breath.    Cardiovascular:  Negative for chest pain.   Gastrointestinal:  Negative for abdominal pain, nausea and vomiting.   Musculoskeletal:  Negative for back pain, joint pain, myalgias and neck pain.   Skin:  Negative for itching and rash.   Neurological:  Positive for headaches. Negative for dizziness, tremors, sensory change, speech change, focal weakness, seizures, loss of consciousness and weakness.   Psychiatric/Behavioral:  Negative for depression, hallucinations and memory loss. The patient does not have insomnia.    All other systems reviewed and are negative.     Objective:     NEUROLOGICAL EXAMINATION:     MENTAL STATUS   Oriented to person, place, and time.   Registration: recalls 3 of 3 objects. Recall at 5 minutes: recalls 3 of 3 objects.   Attention: normal. Concentration: normal.   Speech: speech is normal   Level of consciousness: alert  Knowledge: good and consistent with education.   Normal comprehension.      CRANIAL NERVES     CN II   Visual fields full to confrontation.   Visual acuity: normal  Right visual field deficit: none  Left visual field deficit: none     CN III, IV, VI   Pupils are equal, round, and reactive to light.  Extraocular motions are normal.   Right pupil: Size: 3 mm. Shape: regular. Reactivity: brisk. Consensual response: intact. Accommodation: intact.   Left pupil: Size: 3 mm. Shape: regular. Reactivity: brisk. Consensual response: intact. Accommodation: intact.   CN III: no CN III palsy  CN VI: no CN VI palsy  Nystagmus: none   Diplopia: none  Upgaze: normal  Downgaze: normal  Conjugate gaze: present  Vestibulo-ocular reflex: present    CN V   Facial sensation intact.   Right facial sensation deficit: none  Left facial sensation deficit: none  Right corneal reflex: normal  Left corneal reflex: normal    CN VII   Facial expression full, symmetric.   Right facial weakness: none  Left facial weakness: none  Right taste: normal  Left taste: normal    CN VIII   CN VIII normal.   Hearing: intact    CN IX, X   CN IX normal.   CN X normal.   Palate: symmetric    CN XI   CN XI normal.   Right sternocleidomastoid strength: normal  Left sternocleidomastoid strength: normal  Right trapezius strength: normal  Left trapezius strength: normal    CN XII   CN XII normal.   Tongue: not atrophic  Fasciculations: absent  Tongue deviation: none    MOTOR EXAM   Muscle bulk: normal  Overall muscle tone: normal  Right arm tone: normal  Left arm tone: normal  Right arm pronator drift: absent  Left arm pronator drift: absent  Right leg tone: normal  Left leg tone: normal    Strength   Right neck flexion: 5/5  Left neck flexion: 5/5  Right neck extension: 5/5  Left neck extension: 5/5  Right deltoid: 5/5  Left deltoid: 5/5  Right biceps: 5/5  Left biceps: 5/5  Right triceps: 5/5  Left triceps: 5/5  Right wrist flexion: 5/5  Left wrist flexion: 5/5  Right wrist extension: 5/5  Left wrist extension: 5/5  Right interossei:  5/5  Left interossei: 5/5  Right iliopsoas: 5/5  Left iliopsoas: 5/5  Right quadriceps: 5/5  Left quadriceps: 5/5  Right hamstrin/5  Left hamstrin/5  Right anterior tibial: 5/5  Left anterior tibial: 5/5  Right posterior tibial: 5/5  Left posterior tibial: 5/5  Right gastroc: 5/5  Left gastroc: 5/5    REFLEXES     Reflexes   Right brachioradialis: 2+  Left brachioradialis: 2+  Right biceps: 2+  Left biceps: 2+  Right triceps: 2+  Left triceps: 2+  Right patellar: 2+  Left patellar: 2+  Right achilles: 2+  Left achilles: 2+  Right plantar: normal  Left plantar: normal  Right Cruz: absent  Left Cruz: absent  Right ankle clonus: absent  Left ankle clonus: absent  Right pendular knee jerk: absent  Left pendular knee jerk: absent    SENSORY EXAM   Light touch normal.   Right arm light touch: normal  Left arm light touch: normal  Right leg light touch: normal  Left leg light touch: normal  Vibration normal.   Right arm vibration: normal  Left arm vibration: normal  Right leg vibration: normal  Left leg vibration: normal  Proprioception normal.   Right arm proprioception: normal  Left arm proprioception: normal  Right leg proprioception: normal  Left leg proprioception: normal  Pinprick normal.   Right arm pinprick: normal  Left arm pinprick: normal  Right leg pinprick: normal  Left leg pinprick: normal  Graphesthesia: normal  Romberg: negative  Stereognosis: normal    GAIT AND COORDINATION     Gait  Gait: normal     Coordination   Finger to nose coordination: normal  Heel to shin coordination: normal  Tandem walking coordination: normal    Tremor   Resting tremor: absent  Intention tremor: absent  Action tremor: absent       Physical Exam  Vitals and nursing note reviewed.   Constitutional:       Appearance: Normal appearance.   HENT:      Head: Normocephalic.   Eyes:      Extraocular Movements: Extraocular movements intact and EOM normal.      Pupils: Pupils are equal, round, and reactive to light.    Cardiovascular:      Rate and Rhythm: Normal rate and regular rhythm.   Pulmonary:      Effort: Pulmonary effort is normal.      Breath sounds: Normal breath sounds.   Musculoskeletal:         General: No swelling or tenderness. Normal range of motion.      Cervical back: Normal range of motion and neck supple.      Right lower leg: No edema.      Left lower leg: No edema.   Skin:     General: Skin is warm and dry.      Coloration: Skin is not jaundiced.      Findings: No rash.   Neurological:      General: No focal deficit present.      Mental Status: She is alert and oriented to person, place, and time.      GCS: GCS eye subscore is 4. GCS verbal subscore is 5. GCS motor subscore is 6.      Cranial Nerves: No cranial nerve deficit.      Sensory: No sensory deficit.      Motor: Motor function is intact. No weakness.      Coordination: Coordination is intact. Coordination normal. Finger-Nose-Finger Test, Heel to Shin Test and Romberg Test normal.      Gait: Gait is intact. Gait and tandem walk normal.      Deep Tendon Reflexes: Reflexes normal.      Reflex Scores:       Tricep reflexes are 2+ on the right side and 2+ on the left side.       Bicep reflexes are 2+ on the right side and 2+ on the left side.       Brachioradialis reflexes are 2+ on the right side and 2+ on the left side.       Patellar reflexes are 2+ on the right side and 2+ on the left side.       Achilles reflexes are 2+ on the right side and 2+ on the left side.  Psychiatric:         Mood and Affect: Mood normal.         Speech: Speech normal.         Behavior: Behavior normal.          Assessment:     Problem List Items Addressed This Visit          Neuro    Intractable migraine with aura without status migrainosus - Primary    Relevant Medications    NURTEC 75 mg odt       GI    Nausea     Other Visit Diagnoses       Vitamin D deficient osteomalacia        Relevant Medications    ergocalciferol (ERGOCALCIFEROL) 50,000 unit Cap                    Primary Diagnosis and ICD10  Intractable migraine with aura without status migrainosus [G43.119]    Plan:     Patient Instructions   1. Continue Emgality once monthly injections    2. Continue phenergan and maxalt PRN    3. Continue the Nurtec as directed      Medications Discontinued During This Encounter   Medication Reason    NURTEC 75 mg odt Reorder    ergocalciferol (ERGOCALCIFEROL) 50,000 unit Cap            Requested Prescriptions     Signed Prescriptions Disp Refills    NURTEC 75 mg odt 16 tablet 2     Sig: DISSOLVE 1 TABLET ON THE TONGUE AT ONSET HEADACHE    promethazine (PHENERGAN) 25 MG tablet 20 tablet 2     Sig: Take 1 tablet every 8 hours as needed for migraine.  Not more than 3 days of the week    galcanezumab-gnlm (EMGALITY PEN) 120 mg/mL PnIj 1 each 11     Sig: Inject 1 mL (120 mg total) into the skin every 28 days.    ergocalciferol (ERGOCALCIFEROL) 50,000 unit Cap 3 capsule 3     Sig: Take 1 capsule (50,000 Units total) by mouth every 30 days.       No orders of the defined types were placed in this encounter.

## 2025-01-09 ENCOUNTER — TELEPHONE (OUTPATIENT)
Dept: PHARMACY | Facility: CLINIC | Age: 45
End: 2025-01-09
Payer: COMMERCIAL

## 2025-01-10 ENCOUNTER — OFFICE VISIT (OUTPATIENT)
Dept: DERMATOLOGY | Facility: CLINIC | Age: 45
End: 2025-01-10
Payer: COMMERCIAL

## 2025-01-10 DIAGNOSIS — L82.1 SEBORRHEIC KERATOSES: ICD-10-CM

## 2025-01-10 DIAGNOSIS — L30.9 HAND DERMATITIS: Primary | ICD-10-CM

## 2025-01-10 DIAGNOSIS — D23.9 DERMATOFIBROMA: ICD-10-CM

## 2025-01-10 DIAGNOSIS — L73.1 PSEUDOFOLLICULITIS BARBAE: ICD-10-CM

## 2025-01-10 RX ORDER — TRIAMCINOLONE ACETONIDE 1 MG/G
OINTMENT TOPICAL 2 TIMES DAILY
Qty: 454 G | Refills: 2 | Status: SHIPPED | OUTPATIENT
Start: 2025-01-10

## 2025-01-10 NOTE — PROGRESS NOTES
[facility]  Rancho Eden MD    [address] [place] [address]  [phone]    Fax: [phone]    Patient Name: Rosa Rutledge  Medical Record Number: [id number]  PCP: Sourav Chavez MD  Age: 44 y.o. : 1980  Contact: [phone] (home)     CC: Skin exam  History of Present Illness:     Rosa Rutledge is a 44 y.o.  female with no history of skin cancer who presents to clinic today for skin exam, as she was previously on Imuran for Crohns. She is not currently on Humira and doing well. Patient is also concerned with a rash located on the bilateral hands. She works as a scrub tech.     The patient has no other concerns today.    Review of Systems:     Unremarkable other than mentioned above.     Physical Exam:     General: Relaxed, oriented, alert    Skin examination of the scalp, face, neck, chest, back, abdomen, upper extremities and lower extremities were normal except for as listed below      Assessment and Plan:     1.Hand dermatitis   - erythema and fissuring of palms and digits     - discussed this could represent intrinsic hand eczema or allergic contact dermatitis  - triamcinolone ointment nightly occulded with Nitrile gloves.  - discussed Dupixent if it becomes unmanageable   - will consider patch testing if not improved         2. Seborrheic keratoses   - brown stuck on appearing papules/plaques  - patient educated on benign nature. No treatment necessary unless they become irritated or inflamed       3. Dermatofibroma   - dome-shaped pink-tan nodule with positive dimple sign on the left leg    Plan: Reassure  Dermatofibromas are benign. They should be surgically removed if symptomatic or if they grow.    4.Pseudofolliculitis Barbae   - follicular papules and pustules in hair bearing areas.    Plan:   Pseudofolliculitis Barbae occurs in patients with curly hair that grows inward, causing inflammation.  Pt should shave with the grain, and avoid  trimming hairs too short. Avoiding shaving is best.Chemical depilatories can also be used     -OTC BP gel    Rancho Eden MD   Mohs Surgery/Dermatologic Oncology  Dermatology

## 2025-01-16 ENCOUNTER — OFFICE VISIT (OUTPATIENT)
Dept: FAMILY MEDICINE | Facility: CLINIC | Age: 45
End: 2025-01-16
Payer: COMMERCIAL

## 2025-01-16 DIAGNOSIS — K50.018 CROHN'S DISEASE OF SMALL INTESTINE WITH OTHER COMPLICATION: ICD-10-CM

## 2025-01-16 DIAGNOSIS — Z83.3 FAMILY HISTORY OF DIABETES MELLITUS: Primary | ICD-10-CM

## 2025-01-16 DIAGNOSIS — E87.6 HYPOKALEMIA: ICD-10-CM

## 2025-01-16 LAB
ANION GAP SERPL CALCULATED.3IONS-SCNC: 11 MMOL/L (ref 7–16)
BUN SERPL-MCNC: 11 MG/DL (ref 7–19)
BUN/CREAT SERPL: 17 (ref 6–20)
CALCIUM SERPL-MCNC: 9.2 MG/DL (ref 8.4–10.2)
CHLORIDE SERPL-SCNC: 104 MMOL/L (ref 98–107)
CO2 SERPL-SCNC: 27 MMOL/L (ref 22–29)
CREAT SERPL-MCNC: 0.64 MG/DL (ref 0.55–1.02)
CREAT UR-MCNC: 28 MG/DL (ref 15–325)
EGFR (NO RACE VARIABLE) (RUSH/TITUS): 111 ML/MIN/1.73M2
EST. AVERAGE GLUCOSE BLD GHB EST-MCNC: 108 MG/DL
GLUCOSE SERPL-MCNC: 72 MG/DL (ref 74–100)
HBA1C MFR BLD HPLC: 5.4 %
MAGNESIUM SERPL-MCNC: 2.2 MG/DL (ref 1.6–2.6)
MICROALBUMIN UR-MCNC: <0.5 MG/DL
MICROALBUMIN/CREAT RATIO PNL UR: NORMAL
POTASSIUM SERPL-SCNC: 3.9 MMOL/L (ref 3.5–5.1)
SODIUM SERPL-SCNC: 138 MMOL/L (ref 136–145)

## 2025-01-16 PROCEDURE — 82570 ASSAY OF URINE CREATININE: CPT | Mod: ,,, | Performed by: CLINICAL MEDICAL LABORATORY

## 2025-01-16 PROCEDURE — 99213 OFFICE O/P EST LOW 20 MIN: CPT | Mod: GE,,, | Performed by: FAMILY MEDICINE

## 2025-01-16 PROCEDURE — 1159F MED LIST DOCD IN RCRD: CPT | Mod: ,,, | Performed by: FAMILY MEDICINE

## 2025-01-16 PROCEDURE — 80048 BASIC METABOLIC PNL TOTAL CA: CPT | Mod: ,,, | Performed by: CLINICAL MEDICAL LABORATORY

## 2025-01-16 PROCEDURE — 82043 UR ALBUMIN QUANTITATIVE: CPT | Mod: ,,, | Performed by: CLINICAL MEDICAL LABORATORY

## 2025-01-16 PROCEDURE — 83036 HEMOGLOBIN GLYCOSYLATED A1C: CPT | Mod: ,,, | Performed by: CLINICAL MEDICAL LABORATORY

## 2025-01-16 PROCEDURE — 83735 ASSAY OF MAGNESIUM: CPT | Mod: ,,, | Performed by: CLINICAL MEDICAL LABORATORY

## 2025-01-16 PROCEDURE — 1160F RVW MEDS BY RX/DR IN RCRD: CPT | Mod: ,,, | Performed by: FAMILY MEDICINE

## 2025-01-16 RX ORDER — FLUCONAZOLE 150 MG/1
150 TABLET ORAL ONCE AS NEEDED
Qty: 1 TABLET | Refills: 3 | Status: SHIPPED | OUTPATIENT
Start: 2025-01-16 | End: 2025-01-30

## 2025-01-16 RX ORDER — FLUCONAZOLE 150 MG/1
150 TABLET ORAL ONCE
COMMUNITY
Start: 2025-01-09 | End: 2025-01-16 | Stop reason: SDUPTHER

## 2025-01-16 NOTE — PROGRESS NOTES
CHIEF COMPLAINT:     Medication management and follow-up.    PATIENT SUMMARY:     The patient presented with concerns regarding medication management and follow-up for chronic conditions.    HISTORY OF PRESENT ILLNESS:     The patient reported being confused about the current medications and dosing schedule, specifically regarding magnesium and potassium supplementation. The patient was previously hospitalized and prescribed these medications but did not receive refills. The patient recalled having a history of potassium deficiency and has been taking potassium supplements for the past seven to eight years. The patient was unsure about the necessity of magnesium supplements and requested laboratory tests to assess current levels. The patient reported a recent improvement in potassium levels, moving from the twos to the threes, following dietary changes. Additionally, the patient mentioned a history of Crohn's disease and was concerned about a potential misdiagnosis of diabetes in their medical record, despite never having been diabetic. The patient expressed confusion about receiving alerts related to diabetes care in their MyChart.     PAST MEDICAL HISTORY:     - Asthma  - Hypertension  - Kidney stones  - Crohn's disease    PAST SURGICAL HISTORY:     - Lithotripsy (last year)  - Cholecystectomy  - Breast reduction  - Hysterectomy  - Adenoidectomy  - Tonsillectomy  - Tubal ligation    MEDICATIONS:     - Potassium supplements (specific dosage and frequency not provided)  - Mirapex (for restless legs, once a month)  - Nurtec (for migraines, as needed)  - Humira for Crohns disease  - Clobetasol cream (for dermatitis, as needed)    ALLERGIES:     - No known allergies    SOCIAL HISTORY:     - Alcohol Use: Not mentioned  - Smoking: Never smoked    FAMILY HISTORY:     - Mother: Diabetes, hypertension  - Sister: Asthma, hypertension  - Grandmother: Diabetes, hypertension, asthma    REVIEW OF SYSTEMS:     Cardiac: Positive  for hypertension. Negative for chest pain or palpitations.  Gastrointestinal: Positive for Crohn's disease. Negative for nausea or vomiting.  Dermatological: Positive for dermatitis. Negative for new rashes or lesions.  Endocrine: Negative for diabetes.    VITALS AND PHYSICAL EXAM:     Lungs CTA all quadrant  Heart sounds No murmurs rubs or bruits  Pulse palpable  Skin warm and dry and intact    Not available    ASSESSMENT:     1. Medication Management: The patient experienced confusion regarding the necessity and dosing of magnesium and potassium supplements. Lab tests were planned to evaluate current levels.  2. Crohn's Disease: The patient has a well-documented history of Crohn's disease and underwent regular monitoring. The management plan included periodic endoscopic evaluations.  3. Mislabeling of Diabetes: The patient expressed concern about being incorrectly labeled as diabetic in medical records. The patient's glucose levels and hemoglobin A1C were within normal ranges, confirming the absence of diabetes.    PLAN:     Treatment:  - Continued current medications as prescribed, pending lab results for magnesium and potassium.  - Address dermatitis with clobetasol cream as needed.    Tests:  - Ordered CMP, BMP, hemoglobin A1C, and urine tests to evaluate potassium and magnesium levels.  - Scheduled foot exam and urine test as part of routine diabetes prevention, given family history.    Patient Education:  - Educated the patient on the importance of regular monitoring and follow-up for chronic conditions.  - Explained the rationale for diabetes-related alerts due to family history.    Follow-Up:  - Planned to review lab results and adjust magnesium supplementation if necessary.  - Scheduled a follow-up appointment to discuss lab results and ongoing management of Crohn's disease.    Disposition:  - The patient was directed to the lab immediately for the ordered tests and was advised to call with any questions  or concerns related to medication management.

## 2025-01-26 NOTE — PROGRESS NOTES
Gastroenterology Clinic Note    Patient ID: [id number]   Referring MD: Shruthi Winslow FNP   Chief Complaint:   Chief Complaint   Patient presents with    Follow-up     No problems       History of Present Illness     Rosa Rutledge is an 44 y.o.  female who is referred for follow-up of Crohn's Disease. Diagnosis was made by colonoscopy. Onset was 2024. Disease has involved terminal ileum. The patient has had no surgery for treatment of their IBD. This patient does not have a family history of IBD or colon cancer.      This historical paragraph has been reviewed and updated as necessary from prior clinic notes.     The patient presents today for follow-up.  She is doing well overall.  She reports compliance with Humira monotherapy.  She remains on Linzess daily for chronic constipation.  She is currently having 2 bowel movements per day which are loose without hematochezia or melena.  She is scheduled with Dermatology for skin cancer screening next month.  The patient currently denies significant abdominal pain or discomfort.  The patient does not have fever and chills.  The patient does not have night sweats and nocturnal awakening.  The patient does not have weight loss.  The patient does not have extraintestinal symptoms of oral ulcers, joint pain, and rash.     Last colonoscopy was 10/11/2024 with severe erythematous, ulcerated mucosa with erosion in the terminal ileum, consistent with Crohn's disease     Final Diagnosis   A. Terminal ileum, biopsies:  - Moderately active chronic ileitis  - No granulomas identified       The patient has not been on corticosteroids > or = 10mg prednisone (or equivalent) for 60 or more days.    The patient has been treated with the following medications for IBD:  Imuran    Patient's current therapy is:  Humira    Review of Systems   Constitutional:  Negative for weight loss.   Gastrointestinal:  Positive for abdominal pain, constipation and diarrhea. Negative for  blood in stool, heartburn, melena, nausea and vomiting.       Past Medical History      Past Medical History:   Diagnosis Date    Asthma     COVID     Crohn's disease     Hypertension     Kidney stone        Past Surgical History     Past Surgical History:   Procedure Laterality Date    ADENOIDECTOMY      BREAST SURGERY Bilateral     reduction    CHOLECYSTECTOMY  11/12/2020    CYSTOURETEROSCOPY, WITH HOLMIUM LASER LITHOTRIPSY OF URETERAL CALCULUS AND STENT INSERTION Right 5/16/2024    Procedure: CYSTOURETEROSCOPY WITH HOLMIUM LASER LITHOTRIPSY OF URETERAL CALCULUS, STENT INSERTION AND INDICATED PROCEDURES;  Surgeon: Luc Howard Jr., MD;  Location: [facility];  Service: Urology;  Laterality: Right;    HYSTERECTOMY  2016    Full    REDUCTION OF BOTH BREASTS  01/09/2020    TONSILLECTOMY      TOTAL REDUCTION MAMMOPLASTY      TUBAL LIGATION         Allergies   Review of patient's allergies indicates:  No Known Allergies    Immunization History     Immunization History   Administered Date(s) Administered    COVID-19 Vaccine 01/11/2021, 02/08/2021    COVID-19, MRNA, LN-S, PF (MODERNA FULL 0.5 ML DOSE) 11/17/2021    Influenza 12/16/2021    Influenza - Quadrivalent - PF *Preferred* (6 months and older) 10/21/2022, 10/27/2023    Influenza - Trivalent - Fluarix, Flulaval, Fluzone, Afluria - PF 10/09/2024       Past Family History      Family History   Problem Relation Name Age of Onset    Diabetes Mother      Hypertension Mother      Diabetes Maternal Grandmother      Hypertension Maternal Grandmother      Asthma Maternal Grandmother      Asthma Sister      Hypertension Sister         Past Social History      Social History     Socioeconomic History    Marital status:     Number of children: 2   Occupational History    Occupation: [employer] [facility] [place]      Employer: [employer]   Tobacco Use    Smoking status: Never    Smokeless tobacco: Never   Substance and Sexual Activity    Alcohol use: Never    Drug use: Never    Sexual activity:  Yes     Partners: Male   Social History Narrative    Lives at home with  and second child (13)    No smoking in home , No smoking in home      Social Drivers of Health     Financial Resource Strain: Low Risk  (11/6/2024)    Overall Financial Resource Strain (CARDIA)     Difficulty of Paying Living Expenses: Not hard at all   Food Insecurity: No Food Insecurity (11/6/2024)    Hunger Vital Sign     Worried About Running Out of Food in the Last Year: Never true     Ran Out of Food in the Last Year: Never true   Transportation Needs: No Transportation Needs (11/6/2024)    TRANSPORTATION NEEDS     Transportation : No   Physical Activity: Insufficiently Active (11/6/2024)    Exercise Vital Sign     Days of Exercise per Week: 3 days     Minutes of Exercise per Session: 20 min   Stress: No Stress Concern Present (11/6/2024)    [place] [employer] - Occupational Stress Questionnaire     Feeling of Stress : Only a little   Housing Stability: Low Risk  (11/6/2024)    Housing Stability Vital Sign     Unable to Pay for Housing in the Last Year: No     Homeless in the Last Year: No       Current Medications     Outpatient Medications Marked as Taking for the 1/6/25 encounter (Office Visit) with Shruthi Winslow FNP   Medication Sig Dispense Refill    budesonide 180mcg (PULMICORT FLEXHALER) 180 mcg/actuation AePB Inhale 2 puffs into the lungs every 4 to 6 hours as needed.      linaCLOtide (LINZESS) 290 mcg Cap capsule Take 1 capsule (290 mcg total) by mouth before breakfast. 90 capsule 3    magnesium glycinate 100 mg Tab Take 1 tablet by mouth 2 (two) times a day.      potassium chloride (KLOR-CON) 10 MEQ TbSR Take 1 tablet (10 mEq total) by mouth 2 (two) times daily. 90 tablet 3    triamcinolone acetonide 0.1% (KENALOG) 0.1 % paste APPLY SMALL AMOUNT TO AFFECTED AREA IN THE MOUTH THREE TIMES DAILY      [DISCONTINUED] ergocalciferol (ERGOCALCIFEROL) 50,000 unit Cap TAKE 1 CAPSULE BY MOUTH TWICE A WEEK 8  "capsule 0    [DISCONTINUED] neomycin-polymyxin-hydrocortisone (CORTISPORIN) 3.5-10,000-1 mg/mL-unit/mL-% otic suspension Place 3 drops into the left ear 2 (two) times a day. 10 mL 0    [DISCONTINUED] NURTEC 75 mg odt DISSOLVE 1 TABLET ON THE TONGUE AT ONSET HEADACHE 16 tablet 2    [DISCONTINUED] semaglutide, weight loss, (WEGOVY) 1 mg/0.5 mL PnIj Inject 1 mg into the skin every 7 days. 2 mL 0        I have reviewed the current medications, allergies, vital signs, past medical and surgical history, family medical history, and social history for this encounter and agree with all findings.    OBJECTIVE    Physical Exam    BP (!) 155/92   Pulse 91   Ht 5' 2" (1.575 m)   Wt 119.9 kg (264 lb 6.4 oz)   SpO2 100%   BMI 48.36 kg/m²   GEN: Well appearing, cooperative, NAD  NECK: Supple, no LAD  CV: Normal rate  RESP: Unlabored  ABD: ND, no guarding  EXT: No clubbing, cyanosis, or edema  SKIN: Warm and dry  NEURO: AAO x4.     LABS    CBC (with or without Differential):   Lab Results   Component Value Date    WBC 16.89 (H) 11/08/2024    HGB 10.2 (L) 11/08/2024    HCT 32.3 (L) 11/08/2024    MCV 78.6 (L) 11/08/2024    MCH 24.8 (L) 11/08/2024    MCHC 31.6 (L) 11/08/2024    RDW 14.5 11/08/2024     11/08/2024    MPV 11.2 11/08/2024    NEUTOPHILPCT 86.9 (H) 11/08/2024    DIFFTYPE Auto 11/08/2024     BMP/CMP:   Lab Results   Component Value Date     01/16/2025    K 3.9 01/16/2025     01/16/2025    CO2 27 01/16/2025    BUN 11 01/16/2025    CREATININE 0.64 01/16/2025    GLU 72 (L) 01/16/2025    CALCIUM 9.2 01/16/2025    ALBUMIN 2.1 (L) 11/08/2024    AST 27 11/08/2024    ALT 29 11/08/2024    ALKPHOS 139 (H) 11/08/2024    MG 2.2 01/16/2025        IMAGING  CT abdomen pelvis with IV contrast 11/2024  1. Nonspecific intraluminal fluid within loops of normal caliber colon, as may be seen the setting of diarrheal illness.  2. Additional details of chronic and incidental findings, as provided in the body of the " report.    ASSESSMENT  Rosa Rutledge is a 44 y.o. AAF with history of chronic constipation and hypertension who is referred for newly diagnosed Crohn's disease.    1. Crohn's disease of small intestine with other complication           PLAN    - continue Humira as monotherapy  - continue Linzess daily for chronic constipation  - keep planned follow-up with Dermatology for skin cancer screening  - schedule colonoscopy for disease assessment to ensure mucosal healing on Humira  - return to GI clinic for follow-up in 6 months, sooner as needed      I have counseled the patient on the risks of Humira TNFa including serious infections (TB, bacterial, fungal, opportunistic, etc) due to immunosuppression, malignancies such as Lymphomas (HL, NHL, HSTCL), demyelinating disease, lupus like syndrome, dermatologic reactions (psoriasiform eruption), infusion reactions, New/worsening of heart failure, Hep B/TB reactivation, Hepatotoxicity. Avoid TNFa use in patients with demyelinating disease (MS, optic neuritis, GBS, CIDP, myelitis etc), heart failure, HIV, Seizure disorders, h/o transplant.         I have reviewed the IBD health maintenance as below.      IBD Health Maintenance:  -Prevnar 13: Recommend   -Pneumovax 23: Recommend 8 weeks after Prevnar 13  -Flu Shot: Recommend annually  -Shingrix: Recommend    -Hepatitis A/Hepatitis B: Check antibody and if not immune will vaccinate  -Annual pap smear: Recommend    -Annual skin exam: Recommend   -Colon cancer screening: Colonoscopy:  every 1-2 years for surveillance once in endoscopic remission  -DEXA scan: Recommend  -Tobacco: Avoid  -Should avoid NSAIDs and narcotics, use only Tylenol for pain relief.     There are no Patient Instructions on file for this visit.      No orders of the defined types were placed in this encounter.        The risks and benefits of my recommendations, as well as other treatment options were discussed with the patient today. All questions were  answered.    35 minutes of total time spent on the encounter, which includes face to face time and non-face to face time preparing to see the patient (eg, review of tests), obtaining and/or reviewing separately obtained history, documenting clinical information in the electronic or other health record, Independently interpreting results (not separately reported) and communicating results to the patient/family/caregiver, or care coordination (not separately reported).        Shruthi Winslow, GEREMIASP/ACNP  [facility]

## 2025-02-07 NOTE — PROGRESS NOTES
Annual Exam    Assessment/Plan:  44 y.o.  presenting for her annual exam:    Problem List Items Addressed This Visit    None  Visit Diagnoses       Perimenopausal    -  Primary    Relevant Orders    Follicle Stimulating Hormone (Completed)    Estradiol (Completed)    Atrophic vaginitis        Relevant Medications    estradioL (ESTRACE) 0.01 % (0.1 mg/gram) vaginal cream    Vaginal discharge        Relevant Orders    Vaginosis Screen by DNA Probe    Encounter for gynecological examination (general) (routine) with abnormal findings              Annual exam performed.   No PAP indicated.  Due to h/o vaginitis in the past, vaginal swab performed.  Due to atrophic vaginitis, Estradiol cream prescribed.  Vulva hygiene discussed. Discussed importance of keeping HA1C well controlled in prevention of vaginal candidiasis.   FSH and estradiol ordered since low normal last year.  Will f/u on results and discuss further plan of care.  Continue screening mammograms.   Colon cancer screening  as scheduled.     Virtual visit in 6 weeks to f/u on response to Estradiol vaginal cream.    CC:   Chief Complaint   Patient presents with    Annual Exam       HPI:  44 y.o.  presents for her gynecologic annual exam. Patient underwent hysterectomy with Dr. Fernandez in . She denies any hot flashes or night sweats, but admits to occasional periods of vaginal dryness and pain with vaginal intercourse. Last pap smear was performed in 2023 and was normal. Patient does report chronic history of yeast infections and states she recently took Monistat with mild improvement. She takes both baths and showers and is using Dove soap. Detergent is Gain free of perfumes or dyes.     Family history of diabetes mellitus and personal history of prediabetes. Additionally, she reports recent Crohn's diagnosis in 2024, on Humira.     Mammogram in 2024 with Dr. Mccoy, normal findings.     Scheduled for colonoscopy on  2025.    Patient seen and examined.     Health Maintenance:    Birth control: Hysterectomy  Pregnancy plans: none   Safe relationship: Yes  Healthy diet: Yes  Exercise: ?  PCP: Sourav Chavez MD     Screening:  Last pap smear: 2023 negative vaginal PAP.  History of abnormal pap smears: Denies  STI screening: Declines  Mammogram: 24- negative; scheduled 26  Colonoscopy or colon cancer screening: Ordered by another provider this year    Review of Systems: The following ROS was otherwise negative, except as noted in the HPI:  constitutional, HEENT, respiratory, cardiovascular, gastrointestinal, genitourinary, skin, musculoskeletal, neurological, psych    Primary Care Physician: Sourav Chavez MD    Obstetric History  OB History    Para Term  AB Living   2 2 2         SAB IAB Ectopic Multiple Live Births                  # Outcome Date GA Lbr Alexandre/2nd Weight Sex Type Anes PTL Lv   2 Term            1 Term                Gynecologic History  Menstrual History:   LMP: Unknown    Age of Menarche: ?   Age of Menopause: n/a      Sexual History:    Contraception: see above   Currently is sexually active   Denies history of STIs   Denies sexual problems    Pap History:   History of abnormal pap smears: see above   Last pap: see above    Medical History:  Past Medical History:   Diagnosis Date    Asthma     COVID     Crohn's disease     Hypertension     Kidney stone        Medications:  Medication List with Changes/Refills   New Medications    ESTRADIOL (ESTRACE) 0.01 % (0.1 MG/GRAM) VAGINAL CREAM    Place 1 gram vaginally twice a week.   Current Medications    ADALIMUMAB (HUMIRA PEN) PNKT INJECTION    Inject 1 pen  (40 mg total) into the skin every 14 (fourteen) days.    BUDESONIDE 180MCG (PULMICORT FLEXHALER) 180 MCG/ACTUATION AEPB    Inhale 2 puffs into the lungs every 4 to 6 hours as needed.    ERGOCALCIFEROL (ERGOCALCIFEROL) 50,000 UNIT CAP    Take 1 capsule (50,000 Units total) by mouth  every 30 days.    GALCANEZUMAB-GNLM (EMGALITY PEN) 120 MG/ML PNIJ    Inject 1 mL (120 mg total) into the skin every 28 days.    LINACLOTIDE (LINZESS) 290 MCG CAP CAPSULE    Take 1 capsule (290 mcg total) by mouth before breakfast.    LOSARTAN (COZAAR) 50 MG TABLET    Take 50 mg by mouth once daily.    MAGNESIUM GLYCINATE 100 MG TAB    Take 1 tablet by mouth 2 (two) times a day.    NURTEC 75 MG ODT    DISSOLVE 1 TABLET ON THE TONGUE AT ONSET HEADACHE    POTASSIUM CHLORIDE (KLOR-CON) 10 MEQ TBSR    Take 1 tablet (10 mEq total) by mouth 2 (two) times daily.    PROMETHAZINE (PHENERGAN) 25 MG TABLET    Take 1 tablet every 8 hours as needed for migraine.  Not more than 3 days of the week    TRIAMCINOLONE ACETONIDE 0.1% (KENALOG) 0.1 % OINTMENT    Apply topically 2 (two) times daily on hands as needed for flares tapering with improvement    TRIAMCINOLONE ACETONIDE 0.1% (KENALOG) 0.1 % PASTE    APPLY SMALL AMOUNT TO AFFECTED AREA IN THE MOUTH THREE TIMES DAILY         Surgical History:  Past Surgical History:   Procedure Laterality Date    ADENOIDECTOMY      BREAST SURGERY Bilateral     reduction    CHOLECYSTECTOMY  11/12/2020    CYSTOURETEROSCOPY, WITH HOLMIUM LASER LITHOTRIPSY OF URETERAL CALCULUS AND STENT INSERTION Right 5/16/2024    Procedure: CYSTOURETEROSCOPY WITH HOLMIUM LASER LITHOTRIPSY OF URETERAL CALCULUS, STENT INSERTION AND INDICATED PROCEDURES;  Surgeon: Luc Howard Jr., MD;  Location: [facility];  Service: Urology;  Laterality: Right;    HYSTERECTOMY  2016    Full    REDUCTION OF BOTH BREASTS  01/09/2020    TONSILLECTOMY      TOTAL REDUCTION MAMMOPLASTY      TUBAL LIGATION         Allergies:  Review of patient's allergies indicates:  No Known Allergies    Family History:  Family History   Problem Relation Name Age of Onset    Diabetes Mother      Hypertension Mother      Diabetes Maternal Grandmother      Hypertension Maternal Grandmother      Asthma Maternal Grandmother      Asthma Sister       "Hypertension Sister         Social History:  Social History     Socioeconomic History    Marital status:     Number of children: 2   Occupational History    Occupation: [employer]      Employer: [employer]   Tobacco Use    Smoking status: Never    Smokeless tobacco: Never   Substance and Sexual Activity    Alcohol use: Never    Drug use: Never    Sexual activity: Yes     Partners: Male   Social History Narrative    Lives at home with  and second child (13)    No smoking in home , No smoking in home      Social Drivers of Health     Financial Resource Strain: Low Risk  (11/6/2024)    Overall Financial Resource Strain (CARDIA)     Difficulty of Paying Living Expenses: Not hard at all   Food Insecurity: No Food Insecurity (11/6/2024)    Hunger Vital Sign     Worried About Running Out of Food in the Last Year: Never true     Ran Out of Food in the Last Year: Never true   Transportation Needs: No Transportation Needs (11/6/2024)    TRANSPORTATION NEEDS     Transportation : No   Physical Activity: Insufficiently Active (11/6/2024)    Exercise Vital Sign     Days of Exercise per Week: 3 days     Minutes of Exercise per Session: 20 min   Stress: No Stress Concern Present (11/6/2024)    [employer] [employer] - Occupational Stress Questionnaire     Feeling of Stress : Only a little   Housing Stability: Low Risk  (11/6/2024)    Housing Stability Vital Sign     Unable to Pay for Housing in the Last Year: No     Homeless in the Last Year: No       Objective:  Body mass index is 48.65 kg/m².    BP (!) 136/98 (BP Location: Right arm, Patient Position: Sitting)   Pulse 81   Ht 5' 2" (1.575 m)   Wt 120.7 kg (266 lb)   BMI 48.65 kg/m²     General: Alert, well appearing, no acute distress. Obese.  Head: Normocephalic, atraumatic  Breasts: Symmetric, non-tender to palpation, no skin changes, palpable axillary lymph nodes or masses noted  Lungs: Unlabored respirations. Clear to auscultation " bilaterally.  Cardiovascular: Regular rate and rhythm.   Abdomen: Soft, nontender, nondistended   Pelvic: Exam chaperoned by female assistant.   External: normal female genitalia, no masses or lesions   Vagina: pale, pink mucosa with decreased rugae, minimal clear discharge. Vaginal cuff intact without lesions.   Cervix: surgically absent.  Uterus: surgically absent.  Adnexa: no masses or fullness, nontender  Rectovaginal: deferred  Extremities: No redness or tenderness  Skin: Well perfused, normal coloration and turgor, no lesions or rashes visualized  Neuro: Alert, oriented, normal speech, no focal deficits, moves extremities appropriately  Psych: Normal mood and behavior.     Neftali Rosas MD

## 2025-02-10 ENCOUNTER — PATIENT MESSAGE (OUTPATIENT)
Dept: ADMINISTRATIVE | Facility: OTHER | Age: 45
End: 2025-02-10
Payer: COMMERCIAL

## 2025-02-10 ENCOUNTER — OFFICE VISIT (OUTPATIENT)
Dept: OBSTETRICS AND GYNECOLOGY | Facility: CLINIC | Age: 45
End: 2025-02-10
Payer: COMMERCIAL

## 2025-02-10 VITALS
BODY MASS INDEX: 48.95 KG/M2 | HEIGHT: 62 IN | WEIGHT: 266 LBS | DIASTOLIC BLOOD PRESSURE: 98 MMHG | HEART RATE: 81 BPM | SYSTOLIC BLOOD PRESSURE: 136 MMHG

## 2025-02-10 DIAGNOSIS — N89.8 VAGINAL DISCHARGE: ICD-10-CM

## 2025-02-10 DIAGNOSIS — N95.2 ATROPHIC VAGINITIS: ICD-10-CM

## 2025-02-10 DIAGNOSIS — N95.1 PERIMENOPAUSAL: Primary | ICD-10-CM

## 2025-02-10 DIAGNOSIS — Z01.411 ENCOUNTER FOR GYNECOLOGICAL EXAMINATION (GENERAL) (ROUTINE) WITH ABNORMAL FINDINGS: ICD-10-CM

## 2025-02-10 LAB
BACTERIAL VAGINOSIS DNA (OHS): NEGATIVE
CANDIDA GLABRATA/KRUSEI DNA (OHS): NOT DETECTED
CANDIDA SPECIES DNA (OHS): NOT DETECTED
TRICHOMONAS VAGINALIS DNA (OHS): NOT DETECTED

## 2025-02-10 PROCEDURE — 3008F BODY MASS INDEX DOCD: CPT | Mod: ,,, | Performed by: OBSTETRICS & GYNECOLOGY

## 2025-02-10 PROCEDURE — 3044F HG A1C LEVEL LT 7.0%: CPT | Mod: ,,, | Performed by: OBSTETRICS & GYNECOLOGY

## 2025-02-10 PROCEDURE — 1160F RVW MEDS BY RX/DR IN RCRD: CPT | Mod: ,,, | Performed by: OBSTETRICS & GYNECOLOGY

## 2025-02-10 PROCEDURE — 81515 NFCT DS BV&VAGINITIS DNA ALG: CPT | Mod: QW,,, | Performed by: CLINICAL MEDICAL LABORATORY

## 2025-02-10 PROCEDURE — 3080F DIAST BP >= 90 MM HG: CPT | Mod: ,,, | Performed by: OBSTETRICS & GYNECOLOGY

## 2025-02-10 PROCEDURE — 99214 OFFICE O/P EST MOD 30 MIN: CPT | Mod: PBBFAC | Performed by: OBSTETRICS & GYNECOLOGY

## 2025-02-10 PROCEDURE — 99396 PREV VISIT EST AGE 40-64: CPT | Mod: S$PBB,,, | Performed by: OBSTETRICS & GYNECOLOGY

## 2025-02-10 PROCEDURE — 99999 PR PBB SHADOW E&M-EST. PATIENT-LVL IV: CPT | Mod: PBBFAC,,, | Performed by: OBSTETRICS & GYNECOLOGY

## 2025-02-10 PROCEDURE — 1159F MED LIST DOCD IN RCRD: CPT | Mod: ,,, | Performed by: OBSTETRICS & GYNECOLOGY

## 2025-02-10 PROCEDURE — 3075F SYST BP GE 130 - 139MM HG: CPT | Mod: ,,, | Performed by: OBSTETRICS & GYNECOLOGY

## 2025-02-10 PROCEDURE — 4010F ACE/ARB THERAPY RXD/TAKEN: CPT | Mod: ,,, | Performed by: OBSTETRICS & GYNECOLOGY

## 2025-02-10 RX ORDER — LOSARTAN POTASSIUM 50 MG/1
50 TABLET ORAL DAILY
COMMUNITY

## 2025-02-10 RX ORDER — ESTRADIOL 0.1 MG/G
1 CREAM VAGINAL
Qty: 42.5 G | Refills: 1 | Status: SHIPPED | OUTPATIENT
Start: 2025-02-10 | End: 2025-12-05

## 2025-02-14 ENCOUNTER — PATIENT MESSAGE (OUTPATIENT)
Dept: OBSTETRICS AND GYNECOLOGY | Facility: CLINIC | Age: 45
End: 2025-02-14
Payer: COMMERCIAL

## 2025-02-14 ENCOUNTER — TELEPHONE (OUTPATIENT)
Dept: OBSTETRICS AND GYNECOLOGY | Facility: CLINIC | Age: 45
End: 2025-02-14
Payer: COMMERCIAL

## 2025-02-14 NOTE — TELEPHONE ENCOUNTER
Call cannot be completed.       ----- Message from Neftali Rosas MD sent at 2/12/2025  2:51 PM CST -----  Please let her know that her vaginitis swab is negative. I'm not going to prescribe a chronic vaginitis regimen at this time b/c her current swab is negative. If she continues the lifestyle changes as discussed, then she may not get it gain. If she has symptoms in the future, please tell her that she can do a self swab. I want to know what she tests positive for prior to prescribing a chronic regimen and I don't want to prescribe something unless she needs it. (If she has any questions, let me know.)

## 2025-02-25 DIAGNOSIS — E66.9 OBESITY, UNSPECIFIED CLASSIFICATION, UNSPECIFIED OBESITY TYPE, UNSPECIFIED WHETHER SERIOUS COMORBIDITY PRESENT: ICD-10-CM

## 2025-02-25 RX ORDER — SEMAGLUTIDE 1 MG/.5ML
1 INJECTION, SOLUTION SUBCUTANEOUS
Qty: 2 ML | Refills: 0 | OUTPATIENT
Start: 2025-02-25

## 2025-03-17 ENCOUNTER — PATIENT MESSAGE (OUTPATIENT)
Dept: ADMINISTRATIVE | Facility: OTHER | Age: 45
End: 2025-03-17
Payer: COMMERCIAL

## 2025-03-17 ENCOUNTER — PATIENT MESSAGE (OUTPATIENT)
Dept: ADMINISTRATIVE | Facility: HOSPITAL | Age: 45
End: 2025-03-17
Payer: COMMERCIAL

## 2025-03-19 ENCOUNTER — PATIENT OUTREACH (OUTPATIENT)
Facility: HOSPITAL | Age: 45
End: 2025-03-19
Payer: COMMERCIAL

## 2025-03-19 DIAGNOSIS — I10 PRIMARY HYPERTENSION: Primary | ICD-10-CM

## 2025-03-24 ENCOUNTER — OFFICE VISIT (OUTPATIENT)
Dept: OBSTETRICS AND GYNECOLOGY | Facility: CLINIC | Age: 45
End: 2025-03-24
Payer: COMMERCIAL

## 2025-03-24 DIAGNOSIS — N95.1 PERIMENOPAUSAL: ICD-10-CM

## 2025-03-24 DIAGNOSIS — N95.2 ATROPHIC VAGINITIS: Primary | ICD-10-CM

## 2025-03-24 NOTE — PROGRESS NOTES
Audio Only Telehealth Visit     The patient location is: home  The chief complaint leading to consultation is: atrophic vaginitis  Visit type: Virtual visit with audio only (telephone)  Total time spent in medical discussion with patient: 5 minutes  Total time spent on date of the encounter:10 minutes       The reason for the audio only service rather than synchronous audio and video virtual visit was related to technical difficulties or patient preference/necessity.       Each patient to whom I provide medical services by telemedicine is:  (1) informed of the relationship between the physician and patient and the respective role of any other health care provider with respect to management of the patient; and (2) notified that they may decline to receive medical services by telemedicine and may withdraw from such care at any time. Patient verbally consented to receive this service via voice-only telephone call.       HPI: 43 yo  s/p hysterectomy perimenopausal female is following up on atrophic vaginitis/vaginal dryness since starting the Estradiol cream about 6 weeks ago. She states that she has noticed some difference. She noticed that she does not have as much dryness during sex. Therefore, the pain has improved.   She has also noticed that she is not having any yeast infections since starting it.      Assessment and plan:      Perimenopause.  Atrophic vaginitis.    Continue Estradiol cream as prescribed. Discuss increasing it to 3x/wk some weeks if needed.   Discussed symptoms should continue to improve with continued use.   Call/return prn.  RTC for annual exam in 1 year.                     This service was not originating from a related E/M service provided within the previous 7 days nor will  to an E/M service or procedure within the next 24 hours or my soonest available appointment.  Prevailing standard of care was able to be met in this audio-only visit.

## 2025-04-17 DIAGNOSIS — Z12.11 COLON CANCER SCREENING: Primary | ICD-10-CM

## 2025-04-17 RX ORDER — POLYETHYLENE GLYCOL 3350, SODIUM SULFATE ANHYDROUS, SODIUM BICARBONATE, SODIUM CHLORIDE, POTASSIUM CHLORIDE 236; 22.74; 6.74; 5.86; 2.97 G/4L; G/4L; G/4L; G/4L; G/4L
4 POWDER, FOR SOLUTION ORAL ONCE
Qty: 4000 ML | Refills: 0 | Status: SHIPPED | OUTPATIENT
Start: 2025-04-17 | End: 2025-04-18

## 2025-04-23 ENCOUNTER — OFFICE VISIT (OUTPATIENT)
Dept: ORTHOPEDICS | Facility: CLINIC | Age: 45
End: 2025-04-23
Payer: COMMERCIAL

## 2025-04-23 VITALS
DIASTOLIC BLOOD PRESSURE: 80 MMHG | OXYGEN SATURATION: 99 % | HEIGHT: 61 IN | WEIGHT: 263 LBS | HEART RATE: 87 BPM | SYSTOLIC BLOOD PRESSURE: 146 MMHG | BODY MASS INDEX: 49.65 KG/M2

## 2025-04-23 DIAGNOSIS — M17.12 ARTHRITIS OF LEFT KNEE: Primary | ICD-10-CM

## 2025-04-23 PROCEDURE — 99999 PR PBB SHADOW E&M-EST. PATIENT-LVL IV: CPT | Mod: PBBFAC,,, | Performed by: ORTHOPAEDIC SURGERY

## 2025-04-23 PROCEDURE — 99999PBSHW PR PBB SHADOW TECHNICAL ONLY FILED TO HB: Mod: PBBFAC,,,

## 2025-04-23 PROCEDURE — 99214 OFFICE O/P EST MOD 30 MIN: CPT | Mod: PBBFAC | Performed by: ORTHOPAEDIC SURGERY

## 2025-04-23 PROCEDURE — 20610 DRAIN/INJ JOINT/BURSA W/O US: CPT | Mod: PBBFAC,LT | Performed by: ORTHOPAEDIC SURGERY

## 2025-04-23 RX ORDER — TRIAMCINOLONE ACETONIDE 40 MG/ML
40 INJECTION, SUSPENSION INTRA-ARTICULAR; INTRAMUSCULAR
Status: DISCONTINUED | OUTPATIENT
Start: 2025-04-23 | End: 2025-04-23 | Stop reason: HOSPADM

## 2025-04-23 RX ORDER — BUPIVACAINE HYDROCHLORIDE 2.5 MG/ML
1 INJECTION, SOLUTION EPIDURAL; INFILTRATION; INTRACAUDAL; PERINEURAL
Status: DISCONTINUED | OUTPATIENT
Start: 2025-04-23 | End: 2025-04-23 | Stop reason: HOSPADM

## 2025-04-23 RX ADMIN — TRIAMCINOLONE ACETONIDE 40 MG: 40 INJECTION, SUSPENSION INTRA-ARTICULAR; INTRAMUSCULAR at 03:04

## 2025-04-23 RX ADMIN — BUPIVACAINE HYDROCHLORIDE 1 ML: 2.5 INJECTION, SOLUTION EPIDURAL; INFILTRATION; INTRACAUDAL; PERINEURAL at 03:04

## 2025-04-23 NOTE — PROCEDURES
Large Joint Aspiration/Injection: L knee    Date/Time: 4/23/2025 3:10 PM    Performed by: Dontae Downing MD  Authorized by: Dontae Downing MD    Consent Done?:  Yes (Verbal)  Indications:  Arthritis    Details:  Needle Size:  22 G  Ultrasonic Guidance for needle placement?: No    Approach:  Anterolateral  Location:  Knee  Site:  L knee  Medications:  1 mL BUPivacaine (PF) 0.25% (2.5 mg/ml) 0.25 % (2.5 mg/mL); 40 mg triamcinolone acetonide 40 mg/mL  Patient tolerance:  Patient tolerated the procedure well with no immediate complications

## 2025-04-23 NOTE — PROGRESS NOTES
Patient is here for left knee arthritic changes.  I injected her left knee 1 cc of Marcaine 1 cc Kenalog.  Let her weightbear as tolerates.  Does not wish total knee at this time.  I will follow her up in 3-4 months.

## 2025-04-28 DIAGNOSIS — G43.119 INTRACTABLE MIGRAINE WITH AURA WITHOUT STATUS MIGRAINOSUS: ICD-10-CM

## 2025-04-28 RX ORDER — RIMEGEPANT SULFATE 75 MG/75MG
TABLET, ORALLY DISINTEGRATING ORAL
Qty: 16 TABLET | Refills: 2 | Status: SHIPPED | OUTPATIENT
Start: 2025-04-28

## 2025-04-28 RX ORDER — PROMETHAZINE HYDROCHLORIDE 25 MG/1
TABLET ORAL
Qty: 20 TABLET | Refills: 2 | Status: SHIPPED | OUTPATIENT
Start: 2025-04-28

## 2025-04-29 ENCOUNTER — ANESTHESIA EVENT (OUTPATIENT)
Dept: GASTROENTEROLOGY | Facility: HOSPITAL | Age: 45
End: 2025-04-29
Payer: COMMERCIAL

## 2025-04-29 DIAGNOSIS — B37.9 YEAST INFECTION: Primary | ICD-10-CM

## 2025-04-29 DIAGNOSIS — Z76.0 MEDICATION REFILL: ICD-10-CM

## 2025-04-29 DIAGNOSIS — I10 ESSENTIAL HYPERTENSION: ICD-10-CM

## 2025-04-29 DIAGNOSIS — G62.9 NEUROPATHY: ICD-10-CM

## 2025-04-29 DIAGNOSIS — Z86.39 HISTORY OF HYPOKALEMIA: ICD-10-CM

## 2025-04-29 RX ORDER — FLUCONAZOLE 150 MG/1
150 TABLET ORAL
Qty: 3 TABLET | Refills: 2 | Status: SHIPPED | OUTPATIENT
Start: 2025-04-29

## 2025-04-29 RX ORDER — LOSARTAN POTASSIUM 50 MG/1
50 TABLET ORAL DAILY
Qty: 90 TABLET | Refills: 3 | Status: SHIPPED | OUTPATIENT
Start: 2025-04-29

## 2025-04-29 RX ORDER — GABAPENTIN 300 MG/1
300 CAPSULE ORAL 3 TIMES DAILY
Qty: 270 CAPSULE | Refills: 3 | Status: SHIPPED | OUTPATIENT
Start: 2025-04-29 | End: 2026-04-29

## 2025-04-29 RX ORDER — POTASSIUM CHLORIDE 750 MG/1
10 TABLET, EXTENDED RELEASE ORAL 2 TIMES DAILY
Qty: 90 TABLET | Refills: 3 | Status: SHIPPED | OUTPATIENT
Start: 2025-04-29

## 2025-05-02 ENCOUNTER — ANESTHESIA (OUTPATIENT)
Dept: GASTROENTEROLOGY | Facility: HOSPITAL | Age: 45
End: 2025-05-02
Payer: COMMERCIAL

## 2025-05-02 ENCOUNTER — HOSPITAL ENCOUNTER (OUTPATIENT)
Dept: GASTROENTEROLOGY | Facility: HOSPITAL | Age: 45
Discharge: HOME | End: 2025-05-02
Admitting: INTERNAL MEDICINE
Payer: COMMERCIAL

## 2025-05-02 VITALS
SYSTOLIC BLOOD PRESSURE: 156 MMHG | WEIGHT: 263 LBS | DIASTOLIC BLOOD PRESSURE: 95 MMHG | RESPIRATION RATE: 17 BRPM | HEIGHT: 61 IN | HEART RATE: 70 BPM | OXYGEN SATURATION: 100 % | TEMPERATURE: 97 F | BODY MASS INDEX: 49.65 KG/M2

## 2025-05-02 DIAGNOSIS — K50.018 CROHN'S DISEASE OF SMALL INTESTINE WITH OTHER COMPLICATION: ICD-10-CM

## 2025-05-02 PROCEDURE — 25000003 PHARM REV CODE 250

## 2025-05-02 PROCEDURE — 27000284 HC CANNULA NASAL

## 2025-05-02 PROCEDURE — 37000009 HC ANESTHESIA EA ADD 15 MINS

## 2025-05-02 PROCEDURE — 88305 TISSUE EXAM BY PATHOLOGIST: CPT | Mod: TC,SUR | Performed by: INTERNAL MEDICINE

## 2025-05-02 PROCEDURE — 27201423 OPTIME MED/SURG SUP & DEVICES STERILE SUPPLY

## 2025-05-02 PROCEDURE — 63600175 PHARM REV CODE 636 W HCPCS

## 2025-05-02 PROCEDURE — 37000008 HC ANESTHESIA 1ST 15 MINUTES

## 2025-05-02 RX ORDER — PROPOFOL 10 MG/ML
VIAL (ML) INTRAVENOUS
Status: DISCONTINUED | OUTPATIENT
Start: 2025-05-02 | End: 2025-05-02

## 2025-05-02 RX ORDER — LIDOCAINE HYDROCHLORIDE 20 MG/ML
INJECTION, SOLUTION EPIDURAL; INFILTRATION; INTRACAUDAL; PERINEURAL
Status: DISCONTINUED | OUTPATIENT
Start: 2025-05-02 | End: 2025-05-02

## 2025-05-02 RX ORDER — SODIUM CHLORIDE 0.9 % (FLUSH) 0.9 %
10 SYRINGE (ML) INJECTION
Status: DISCONTINUED | OUTPATIENT
Start: 2025-05-02 | End: 2025-05-03 | Stop reason: HOSPADM

## 2025-05-02 RX ADMIN — PROPOFOL 50 MG: 10 INJECTION, EMULSION INTRAVENOUS at 08:05

## 2025-05-02 RX ADMIN — PROPOFOL 20 MG: 10 INJECTION, EMULSION INTRAVENOUS at 08:05

## 2025-05-02 RX ADMIN — LIDOCAINE HYDROCHLORIDE 50 MG: 20 INJECTION, SOLUTION EPIDURAL; INFILTRATION; INTRACAUDAL; PERINEURAL at 08:05

## 2025-05-02 RX ADMIN — SODIUM CHLORIDE: 9 INJECTION, SOLUTION INTRAVENOUS at 08:05

## 2025-05-02 RX ADMIN — PROPOFOL 10 MG: 10 INJECTION, EMULSION INTRAVENOUS at 08:05

## 2025-05-02 NOTE — H&P
Gastroenterology Pre-procedure H&P    History of Present Illness    Rosa Rutledge is a 44 y.o. female that  has a past medical history of Asthma, COVID, Crohn's disease, Hypertension, and Kidney stone.     Patient with stricturing crohn's disease inolving TI and perianal disease here for colonoscopy. Currently on humira for >6 months. Did not tolerate imuran therapy.       Past Medical History:   Diagnosis Date    Asthma     COVID     Crohn's disease     Hypertension     Kidney stone        Past Surgical History:   Procedure Laterality Date    ADENOIDECTOMY      BREAST SURGERY Bilateral     reduction    CHOLECYSTECTOMY  11/12/2020    CYSTOURETEROSCOPY, WITH HOLMIUM LASER LITHOTRIPSY OF URETERAL CALCULUS AND STENT INSERTION Right 5/16/2024    Procedure: CYSTOURETEROSCOPY WITH HOLMIUM LASER LITHOTRIPSY OF URETERAL CALCULUS, STENT INSERTION AND INDICATED PROCEDURES;  Surgeon: Luc Howard Jr., MD;  Location: [facility];  Service: Urology;  Laterality: Right;    HYSTERECTOMY  2016    Full    REDUCTION OF BOTH BREASTS  01/09/2020    TONSILLECTOMY      TOTAL REDUCTION MAMMOPLASTY      TUBAL LIGATION         Family History   Problem Relation Name Age of Onset    Diabetes Mother Taisha Chaudharib     Hypertension Mother Taishaasiya Chaudharib     Diabetes Maternal Grandmother Светлана Batres     Hypertension Maternal Grandmother Светлана Batres     Asthma Maternal Grandmother Светлана Batres     Asthma Sister Candylaria Nation     Hypertension Sister Candylaria Nation        Social History     Socioeconomic History    Marital status:     Number of children: 2   Occupational History    Occupation: [email or web address]      Employer: [employer]   Tobacco Use    Smoking status: Never    Smokeless tobacco: Never   Substance and Sexual Activity    Alcohol use: Never    Drug use: Never    Sexual activity: Not Currently     Partners: Male     Birth control/protection: See Surgical Hx   Social History Narrative    Lives at home with  and second child  (13)    No smoking in home , No smoking in home      Social Drivers of Health     Financial Resource Strain: Low Risk  (11/6/2024)    Overall Financial Resource Strain (CARDIA)     Difficulty of Paying Living Expenses: Not hard at all   Food Insecurity: No Food Insecurity (11/6/2024)    Hunger Vital Sign     Worried About Running Out of Food in the Last Year: Never true     Ran Out of Food in the Last Year: Never true   Transportation Needs: No Transportation Needs (11/6/2024)    TRANSPORTATION NEEDS     Transportation : No   Physical Activity: Insufficiently Active (11/6/2024)    Exercise Vital Sign     Days of Exercise per Week: 3 days     Minutes of Exercise per Session: 20 min   Stress: No Stress Concern Present (11/6/2024)    [employer] [place] [employer] - Occupational Stress Questionnaire     Feeling of Stress : Only a little   Housing Stability: Unknown (11/6/2024)    Housing Stability Vital Sign     Unable to Pay for Housing in the Last Year: No     Homeless in the Last Year: No       Current Medications[1]    Review of patient's allergies indicates:  No Known Allergies    Objective:  There were no vitals filed for this visit.     GEN: normal appearing, NAD, AAO x3  HENT: NCAT, anicteric, OP benign  CV: normal rate, regular rhythm  RESP: NABS, symmetric rise, unlabored  ABD: soft, ND, no guarding or TTP  SKIN: warm and dry  NEURO: grossly afocal    Assessment and Plan:    Proceed with:    Colonoscopy for Crohn's disease    Rolf Padilla MD  Gastroenterology       [1]   Current Outpatient Medications   Medication Sig Dispense Refill    adalimumab (HUMIRA PEN) PnKt injection Inject 1 pen  (40 mg total) into the skin every 14 (fourteen) days. 2 pen 11    budesonide 180mcg (PULMICORT FLEXHALER) 180 mcg/actuation AePB Inhale 2 puffs into the lungs every 4 to 6 hours as needed.      ergocalciferol (ERGOCALCIFEROL) 50,000 unit Cap Take 1 capsule (50,000 Units total) by mouth every 30 days. 3 capsule 3     estradioL (ESTRACE) 0.01 % (0.1 mg/gram) vaginal cream Place 1 gram vaginally twice a week. 42.5 g 1    fluconazole (DIFLUCAN) 150 MG Tab Take 1 tablet (150 mg total) by mouth every 72 hours. 3 tablet 2    gabapentin (NEURONTIN) 300 MG capsule Take 1 capsule (300 mg total) by mouth 3 (three) times daily... May cause drowsiness 270 capsule 3    galcanezumab-gnlm (EMGALITY PEN) 120 mg/mL PnIj Inject 1 mL (120 mg total) into the skin every 28 days. 1 mL 11    linaCLOtide (LINZESS) 145 mcg Cap capsule Take 2 capsules by mouth before breakfast. 180 capsule 3    linaCLOtide (LINZESS) 290 mcg Cap capsule Take 1 capsule (290 mcg total) by mouth before breakfast. 90 capsule 3    losartan (COZAAR) 50 MG tablet Take 1 tablet (50 mg total) by mouth once daily. 90 tablet 3    NURTEC 75 mg odt DISSOLVE 1 TABLET ON THE TONGUE AT ONSET HEADACHE 16 tablet 2    potassium chloride (KLOR-CON) 10 MEQ TbSR Take 1 tablet (10 mEq total) by mouth 2 (two) times daily. 90 tablet 3    promethazine (PHENERGAN) 25 MG tablet Take 1 tablet every 8 hours as needed for migraine.  Not more than 3 days of the week 20 tablet 2    triamcinolone acetonide 0.1% (KENALOG) 0.1 % ointment Apply topically 2 (two) times daily on hands as needed for flares tapering with improvement 454 g 2    triamcinolone acetonide 0.1% (KENALOG) 0.1 % paste APPLY SMALL AMOUNT TO AFFECTED AREA IN THE MOUTH THREE TIMES DAILY       No current facility-administered medications for this encounter.

## 2025-05-02 NOTE — ANESTHESIA PREPROCEDURE EVALUATION
05/02/2025  Rosa Rutledge is a 44 y.o., female.    Outpatient Medications as of 5/2/2025   Medication Sig Dispense Refill    adalimumab (HUMIRA PEN) PnKt injection Inject 1 pen  (40 mg total) into the skin every 14 (fourteen) days. 2 pen 11    budesonide 180mcg (PULMICORT FLEXHALER) 180 mcg/actuation AePB Inhale 2 puffs into the lungs every 4 to 6 hours as needed.      ergocalciferol (ERGOCALCIFEROL) 50,000 unit Cap Take 1 capsule (50,000 Units total) by mouth every 30 days. 3 capsule 3    estradioL (ESTRACE) 0.01 % (0.1 mg/gram) vaginal cream Place 1 gram vaginally twice a week. 42.5 g 1    fluconazole (DIFLUCAN) 150 MG Tab Take 1 tablet (150 mg total) by mouth every 72 hours. 3 tablet 2    gabapentin (NEURONTIN) 300 MG capsule Take 1 capsule (300 mg total) by mouth 3 (three) times daily... May cause drowsiness 270 capsule 3    galcanezumab-gnlm (EMGALITY PEN) 120 mg/mL PnIj Inject 1 mL (120 mg total) into the skin every 28 days. 1 mL 11    linaCLOtide (LINZESS) 145 mcg Cap capsule Take 2 capsules by mouth before breakfast. 180 capsule 3    linaCLOtide (LINZESS) 290 mcg Cap capsule Take 1 capsule (290 mcg total) by mouth before breakfast. 90 capsule 3    losartan (COZAAR) 50 MG tablet Take 1 tablet (50 mg total) by mouth once daily. 90 tablet 3    NURTEC 75 mg odt DISSOLVE 1 TABLET ON THE TONGUE AT ONSET HEADACHE 16 tablet 2    potassium chloride (KLOR-CON) 10 MEQ TbSR Take 1 tablet (10 mEq total) by mouth 2 (two) times daily. 90 tablet 3    promethazine (PHENERGAN) 25 MG tablet Take 1 tablet every 8 hours as needed for migraine.  Not more than 3 days of the week 20 tablet 2    triamcinolone acetonide 0.1% (KENALOG) 0.1 % ointment Apply topically 2 (two) times daily on hands as needed for flares tapering with improvement 454 g 2    triamcinolone acetonide 0.1% (KENALOG) 0.1 % paste APPLY SMALL AMOUNT TO  AFFECTED AREA IN THE MOUTH THREE TIMES DAILY       No current facility-administered medications on file as of 5/2/2025.     Active Ambulatory Problems     Diagnosis Date Noted    Asthma 09/07/2021    Hypertension 09/07/2021    Chronic migraine with aura 12/14/2021    Intractable migraine with aura without status migrainosus 12/14/2021    Abdominal obesity and metabolic syndrome 02/02/2022    Chronic idiopathic constipation 02/02/2022    Acute left-sided low back pain with left-sided sciatica 04/22/2022    Insomnia 04/25/2023    Arthritis of left knee 11/27/2023    Screening for malignant neoplasm of colon 04/12/2024    Class 3 severe obesity with body mass index (BMI) of 45.0 to 49.9 in adult 08/23/2024    Erosion of terminal ileum 10/11/2024    Crohn's disease of small intestine with other complication 10/24/2024    Generalized abdominal pain 11/05/2024    Sepsis 11/05/2024    Hypokalemia 11/05/2024    Urinary tract infection without hematuria 11/05/2024    History of Crohn's disease 11/06/2024    Right lower quadrant abdominal pain 11/06/2024    Nausea vomiting and diarrhea 11/08/2024    Nausea 01/08/2025     Resolved Ambulatory Problems     Diagnosis Date Noted    Encounter for general adult medical examination without abnormal findings 03/04/2022     Past Medical History:   Diagnosis Date    COVID     Crohn's disease     Kidney stone      Past Surgical History:   Procedure Laterality Date    ADENOIDECTOMY      BREAST SURGERY Bilateral     reduction    CHOLECYSTECTOMY  11/12/2020    CYSTOURETEROSCOPY, WITH HOLMIUM LASER LITHOTRIPSY OF URETERAL CALCULUS AND STENT INSERTION Right 5/16/2024    Procedure: CYSTOURETEROSCOPY WITH HOLMIUM LASER LITHOTRIPSY OF URETERAL CALCULUS, STENT INSERTION AND INDICATED PROCEDURES;  Surgeon: Luc Howard Jr., MD;  Location: [facility];  Service: Urology;  Laterality: Right;    HYSTERECTOMY  2016    Full    REDUCTION OF BOTH BREASTS  01/09/2020    TONSILLECTOMY      TOTAL  REDUCTION MAMMOPLASTY      TUBAL LIGATION         Pre-op Assessment    I have reviewed the Patient Summary Reports.     I have reviewed the Nursing Notes. I have reviewed the NPO Status.   I have reviewed the Medications.     Review of Systems  Anesthesia Hx:  No problems with previous Anesthesia             Denies Family Hx of Anesthesia complications.    Denies Personal Hx of Anesthesia complications.                    Social:  Non-Smoker, No Alcohol Use       Hematology/Oncology:    Oncology Normal    -- Anemia:                                  EENT/Dental:  EENT/Dental Normal           Cardiovascular:     Hypertension              ECG has been reviewed.                            Pulmonary:    Asthma mild                   Renal/:   Denies Chronic Renal Disease. renal calculi               Hepatic/GI:  Bowel Prep. PUD,     Crohn's disease             Musculoskeletal:  Arthritis               Neurological:    Neuromuscular Disease,  Headaches                                 Endocrine:  Endocrine Normal          Morbid Obesity / BMI > 40  Dermatological:  Skin Normal    Psych:  Psychiatric Normal                    Chemistry        Component Value Date/Time     01/16/2025 0952    K 3.9 01/16/2025 0952     01/16/2025 0952    CO2 27 01/16/2025 0952    BUN 11 01/16/2025 0952    CREATININE 0.64 01/16/2025 0952    GLU 72 (L) 01/16/2025 0952        Component Value Date/Time    CALCIUM 9.2 01/16/2025 0952    ALKPHOS 139 (H) 11/08/2024 0443    AST 27 11/08/2024 0443    ALT 29 11/08/2024 0443    BILITOT 3.9 (H) 11/08/2024 0443    ESTGFRAFRICA 121 11/10/2021 1415    EGFRNONAA 134 03/04/2022 0837        Lab Results   Component Value Date    WBC 16.89 (H) 11/08/2024    HGB 10.2 (L) 11/08/2024    HCT 32.3 (L) 11/08/2024    MCV 78.6 (L) 11/08/2024     11/08/2024       Results for orders placed or performed during the hospital encounter of 11/05/24   EKG 12-lead    Collection Time: 11/06/24  9:32 PM    Result Value Ref Range    QRS Duration 70 ms    OHS QTC Calculation 443 ms    Narrative    Test Reason : R00.0,    Vent. Rate : 114 BPM     Atrial Rate : 114 BPM     P-R Int : 132 ms          QRS Dur :  70 ms      QT Int : 322 ms       P-R-T Axes :  64  90  48 degrees    QTcB Int : 443 ms    Sinus tachycardia  Rightward axis  Borderline Abnormal ECG  When compared with ECG of 04-NOV-2024 07:36,  PREVIOUS ECG IS PRESENT  Confirmed by Johnnie Henderson ([id number]) on 11/24/2024 11:46:38 PM    Referred By: AAAREFERRAL SELF           Confirmed By: Johnnie Hednerson         Physical Exam  General: Well nourished, Cooperative, Alert and Oriented    Airway:  Mallampati: III   Mouth Opening: Normal  TM Distance: Normal  Tongue: Normal  Neck ROM: Normal ROM    Dental:  Intact    Chest/Lungs:  Normal Respiratory Rate        Anesthesia Plan  Type of Anesthesia, risks & benefits discussed:    Anesthesia Type: MAC  Intra-op Monitoring Plan: Standard ASA Monitors  Post Op Pain Control Plan: multimodal analgesia  Induction:  IV  Informed Consent: Informed consent signed with the Patient and all parties understand the risks and agree with anesthesia plan.  All questions answered. Patient consented to blood products? Yes  ASA Score: 3  Day of Surgery Review of History & Physical: H&P Update referred to the surgeon/provider.I have interviewed and examined the patient. I have reviewed the patient's H&P dated: There are no significant changes.     Ready For Surgery From Anesthesia Perspective.     .

## 2025-05-02 NOTE — TRANSFER OF CARE
"Anesthesia Transfer of Care Note    Patient: Rosa Rutledge    Procedure(s) Performed: * Colonoscopy  *    Patient location: GI    Anesthesia Type: MAC    Transport from OR: Transported from OR on room air with adequate spontaneous ventilation. Continuous ECG monitoring in transport. Continuous SpO2 monitoring in transport    Post pain: adequate analgesia    Post assessment: no apparent anesthetic complications    Post vital signs: stable    Level of consciousness: sedated and responds to stimulation    Nausea/Vomiting: no nausea/vomiting    Complications: none    Transfer of care protocol was followedComments: Good SV continue, NAD, VSS, RTRN      Last vitals: Visit Vitals  /60 (BP Location: Right arm, Patient Position: Lying)   Pulse 70   Temp 36.1 °C (97 °F) (Skin)   Resp 16   Ht 5' 1" (1.549 m)   Wt 119.3 kg (263 lb)   SpO2 100%   Breastfeeding No   BMI 49.69 kg/m²     "

## 2025-05-02 NOTE — ANESTHESIA POSTPROCEDURE EVALUATION
Anesthesia Post Evaluation    Patient: Rosa Rutledge    Procedure(s) Performed: *Colonoscopy     Final Anesthesia Type: MAC      Patient location during evaluation: GI PACU  Patient participation: Yes- Able to Participate  Level of consciousness: awake and alert  Post-procedure vital signs: reviewed and stable  Pain management: adequate  Airway patency: patent    PONV status at discharge: No PONV  Anesthetic complications: no      Cardiovascular status: blood pressure returned to baseline and hemodynamically stable  Respiratory status: unassisted, spontaneous ventilation and room air  Hydration status: euvolemic  Follow-up not needed.  Comments: Refer to nursing notes for pain/chelsea score upon discharge from recovery.              Vitals Value Taken Time   /73 05/02/25 09:07   Temp 98 F 05/02/25 09:12   Pulse 71 05/02/25 09:11   Resp 20 05/02/25 09:11   SpO2 99 % 05/02/25 09:11   Vitals shown include unfiled device data.      No case tracking events are documented in the log.      Pain/Chelsea Score: Chelsea Score: 9 (5/2/2025  8:59 AM)

## 2025-05-02 NOTE — DISCHARGE INSTRUCTIONS
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  (grade 2) hemorrhoids     Recommendation  Await pathology results    Interval mucosal healing on humira therapy; continue humira monotherapy (did not tolerate Imuran); did discuss adding MTX, but given endoscopic remission today, current therapy is working well    Start Nifedipine/lidocaine ointment per rectum for 3 months     Recommend scheduling MRI Pelvis W/WO to evaluate for perianal disease - please schedule 4-8 weeks out - Friday , June 6, 2025 @ 9:15 am    Follow up in clinic as scheduled - July 7, 2025 @ 2:40 pm     Repeat screening colonoscopy in 10 years for screening       Outcome of procedure: successful Colonoscopy  Disposition: patient to recovery following procedure; discharge to home when appropriate parameters met  Provisions for follow up: please call my office for any unexpected symptoms like chest or abdominal pain or bleeding following your procedure.    No driving today, no operating heavy machinery, no signing any legal documents until tomorrow.    Drink lots of fluids, resume regular diet.  Take your normal medications.     Please call our office for any nausea, vomiting or abdominal pain.

## 2025-05-05 ENCOUNTER — RESULTS FOLLOW-UP (OUTPATIENT)
Dept: GASTROENTEROLOGY | Facility: HOSPITAL | Age: 45
End: 2025-05-05

## 2025-05-05 LAB
ESTROGEN SERPL-MCNC: NORMAL PG/ML
INSULIN SERPL-ACNC: NORMAL U[IU]/ML
LAB AP GROSS DESCRIPTION: NORMAL
LAB AP LABORATORY NOTES: NORMAL
T3RU NFR SERPL: NORMAL %

## 2025-06-05 DIAGNOSIS — B37.9 YEAST INFECTION: Primary | ICD-10-CM

## 2025-06-05 RX ORDER — FLUCONAZOLE 150 MG/1
150 TABLET ORAL
Qty: 3 TABLET | Refills: 2 | Status: SHIPPED | OUTPATIENT
Start: 2025-06-05

## 2025-06-11 ENCOUNTER — PATIENT MESSAGE (OUTPATIENT)
Dept: ADMINISTRATIVE | Facility: OTHER | Age: 45
End: 2025-06-11
Payer: COMMERCIAL

## 2025-07-24 ENCOUNTER — OFFICE VISIT (OUTPATIENT)
Dept: GASTROENTEROLOGY | Facility: CLINIC | Age: 45
End: 2025-07-24
Payer: COMMERCIAL

## 2025-07-24 VITALS
OXYGEN SATURATION: 97 % | DIASTOLIC BLOOD PRESSURE: 85 MMHG | HEIGHT: 62 IN | WEIGHT: 275 LBS | HEART RATE: 78 BPM | BODY MASS INDEX: 50.61 KG/M2 | SYSTOLIC BLOOD PRESSURE: 135 MMHG

## 2025-07-24 DIAGNOSIS — K58.1 IRRITABLE BOWEL SYNDROME WITH CONSTIPATION: ICD-10-CM

## 2025-07-24 DIAGNOSIS — K50.018 CROHN'S DISEASE OF SMALL INTESTINE WITH OTHER COMPLICATION: Primary | ICD-10-CM

## 2025-07-24 PROCEDURE — 99999 PR PBB SHADOW E&M-EST. PATIENT-LVL IV: CPT | Mod: PBBFAC,,,

## 2025-07-24 PROCEDURE — 99214 OFFICE O/P EST MOD 30 MIN: CPT | Mod: PBBFAC

## 2025-07-25 NOTE — PROGRESS NOTES
Gastroenterology Clinic Note    Patient ID: [id number]   Referring MD: Shruthi Winslow FNP   Chief Complaint:   Chief Complaint   Patient presents with    Follow-up       History of Present Illness     Rosa Rutledge is an 44 y.o.  female who is referred for follow-up of Crohn's Disease. Diagnosis was made by colonoscopy. Onset was 2024. Disease has involved terminal ileum. The patient has had no surgery for treatment of their IBD. This patient does not have a family history of IBD or colon cancer.      This historical paragraph has been reviewed and updated as necessary from prior clinic notes.     The patient presents today for follow-up.  She is doing well overall.  She reports compliance with Humira monotherapy.  She remains on Linzess daily for chronic constipation.  She is currently having 2 bowel movements per day which are loose without hematochezia or melena.  She underwent repeat colonoscopy 05/02/2025 which revealed achievement of mucosal healing with Humira therapy.  MRI pelvis with and without contrast 06/2025 revealed no evidence of perirectal or perianal fistula.  The patient currently denies significant abdominal pain or discomfort.  The patient does not have fever and chills.  The patient does not have night sweats and nocturnal awakening.  The patient does not have weight loss.  The patient does not have extraintestinal symptoms of oral ulcers, joint pain, and rash.     Last colonoscopy was 05/02/2025 with interval mucosal healing on Humira therapy.  Previous colonoscopy 10/11/2024  with severe erythematous, ulcerated mucosa with erosion in the terminal ileum, consistent with Crohn's disease - pathology below.    Final Diagnosis   A. Terminal ileum, biopsies:  - Moderately active chronic ileitis  - No granulomas identified       The patient has not been on corticosteroids > or = 10mg prednisone (or equivalent) for 60 or more days.    The patient has been treated with the following  medications for IBD:  Imuran    Patient's current therapy is:  Humira    Review of Systems   Constitutional:  Negative for weight loss.   Gastrointestinal:  Positive for constipation. Negative for abdominal pain, blood in stool, diarrhea, heartburn, melena, nausea and vomiting.       Past Medical History      Past Medical History:   Diagnosis Date    Asthma     COVID     Crohn's disease     Hypertension     Kidney stone        Past Surgical History     Past Surgical History:   Procedure Laterality Date    ADENOIDECTOMY      BREAST SURGERY Bilateral     reduction    CHOLECYSTECTOMY  11/12/2020    CYSTOURETEROSCOPY, WITH HOLMIUM LASER LITHOTRIPSY OF URETERAL CALCULUS AND STENT INSERTION Right 5/16/2024    Procedure: CYSTOURETEROSCOPY WITH HOLMIUM LASER LITHOTRIPSY OF URETERAL CALCULUS, STENT INSERTION AND INDICATED PROCEDURES;  Surgeon: Luc Howard Jr., MD;  Location: [facility];  Service: Urology;  Laterality: Right;    HYSTERECTOMY  2016    Full    REDUCTION OF BOTH BREASTS  01/09/2020    TONSILLECTOMY      TOTAL REDUCTION MAMMOPLASTY      TUBAL LIGATION         Allergies   Review of patient's allergies indicates:  No Known Allergies    Immunization History     Immunization History   Administered Date(s) Administered    COVID-19 Vaccine 01/11/2021, 02/08/2021    COVID-19, MRNA, LN-S, PF (MODERNA FULL 0.5 ML DOSE) 11/17/2021    Influenza 12/16/2021    Influenza - Quadrivalent - PF *Preferred* (6 months and older) 10/21/2022, 10/27/2023    Influenza - Trivalent - Fluarix, Flulaval, Fluzone, Afluria - PF 10/09/2024       Past Family History      Family History   Problem Relation Name Age of Onset    Diabetes Mother Taisha Chaudharib     Hypertension Mother Taisha Nation     Diabetes Maternal Grandmother Светлана Batres     Hypertension Maternal Grandmother Светлана Batres     Asthma Maternal Grandmother Светлана Batres     Asthma Sister Candylaria Nation     Hypertension Sister Candylaria Nation        Past Social History       Social History     Socioeconomic History    Marital status:     Number of children: 2   Occupational History    Occupation: [employer]      Employer: [employer]   Tobacco Use    Smoking status: Never    Smokeless tobacco: Never   Substance and Sexual Activity    Alcohol use: Never    Drug use: Never    Sexual activity: Not Currently     Partners: Male     Birth control/protection: See Surgical Hx   Social History Narrative    Lives at home with  and second child (13)    No smoking in home , No smoking in home      Social Drivers of Health     Financial Resource Strain: Low Risk  (11/6/2024)    Overall Financial Resource Strain (CARDIA)     Difficulty of Paying Living Expenses: Not hard at all   Food Insecurity: No Food Insecurity (11/6/2024)    Hunger Vital Sign     Worried About Running Out of Food in the Last Year: Never true     Ran Out of Food in the Last Year: Never true   Transportation Needs: No Transportation Needs (11/6/2024)    TRANSPORTATION NEEDS     Transportation : No   Physical Activity: Insufficiently Active (11/6/2024)    Exercise Vital Sign     Days of Exercise per Week: 3 days     Minutes of Exercise per Session: 20 min   Stress: No Stress Concern Present (11/6/2024)    [employer] - Occupational Stress Questionnaire     Feeling of Stress : Only a little   Housing Stability: Unknown (11/6/2024)    Housing Stability Vital Sign     Unable to Pay for Housing in the Last Year: No     Homeless in the Last Year: No       Current Medications     Outpatient Medications Marked as Taking for the 7/24/25 encounter (Office Visit) with Shruthi Winslow FNP   Medication Sig Dispense Refill    adalimumab (HUMIRA PEN) PnKt injection Inject 1 pen  (40 mg total) into the skin every 14 (fourteen) days. 2 pen 11    budesonide 180mcg (PULMICORT FLEXHALER) 180 mcg/actuation AePB Inhale 2 puffs into the lungs every 4 to 6 hours as needed.      ergocalciferol (ERGOCALCIFEROL) 50,000 unit  "Cap Take 1 capsule (50,000 Units total) by mouth every 30 days. 3 capsule 3    estradioL (ESTRACE) 0.01 % (0.1 mg/gram) vaginal cream Place 1 gram vaginally twice a week. 42.5 g 1    fluconazole (DIFLUCAN) 150 MG Tab Take 1 tablet (150 mg total) by mouth every 72 hours as directed 3 tablet 2    gabapentin (NEURONTIN) 300 MG capsule Take 1 capsule (300 mg total) by mouth 3 (three) times daily... May cause drowsiness 270 capsule 3    galcanezumab-gnlm (EMGALITY PEN) 120 mg/mL PnIj Inject 1 mL (120 mg total) into the skin every 28 days. 1 mL 11    linaCLOtide (LINZESS) 290 mcg Cap capsule Take 1 capsule (290 mcg total) by mouth before breakfast. 90 capsule 3    losartan (COZAAR) 50 MG tablet Take 1 tablet (50 mg total) by mouth once daily. 90 tablet 3    NURTEC 75 mg odt DISSOLVE 1 TABLET ON THE TONGUE AT ONSET HEADACHE 16 tablet 2    potassium chloride (KLOR-CON) 10 MEQ TbSR Take 1 tablet (10 mEq total) by mouth 2 (two) times daily. 90 tablet 3    promethazine (PHENERGAN) 25 MG tablet Take 1 tablet every 8 hours as needed for migraine.  Not more than 3 days of the week... may cause drowsiness 20 tablet 2    triamcinolone acetonide 0.1% (KENALOG) 0.1 % ointment Apply topically 2 (two) times daily on hands as needed for flares tapering with improvement 454 g 2    triamcinolone acetonide 0.1% (KENALOG) 0.1 % paste APPLY SMALL AMOUNT TO AFFECTED AREA IN THE MOUTH THREE TIMES DAILY          I have reviewed the current medications, allergies, vital signs, past medical and surgical history, family medical history, and social history for this encounter and agree with all findings.    OBJECTIVE    Physical Exam    /85   Pulse 78   Ht 5' 2" (1.575 m)   Wt 124.7 kg (275 lb)   SpO2 97%   BMI 50.30 kg/m²   GEN: Well appearing, cooperative, NAD  NECK: Supple, no LAD  CV: Normal rate  RESP: Unlabored  ABD: ND, no guarding  EXT: No clubbing, cyanosis, or edema  SKIN: Warm and dry  NEURO: AAO x4.     LABS    CBC (with or " without Differential):   Lab Results   Component Value Date    WBC 16.89 (H) 11/08/2024    HGB 10.2 (L) 11/08/2024    HCT 32.3 (L) 11/08/2024    MCV 78.6 (L) 11/08/2024    MCH 24.8 (L) 11/08/2024    MCHC 31.6 (L) 11/08/2024    RDW 14.5 11/08/2024     11/08/2024    MPV 11.2 11/08/2024    NEUTOPHILPCT 86.9 (H) 11/08/2024    DIFFTYPE Auto 11/08/2024     BMP/CMP:   Lab Results   Component Value Date     01/16/2025    K 3.9 01/16/2025     01/16/2025    CO2 27 01/16/2025    BUN 11 01/16/2025    CREATININE 0.64 01/16/2025    GLU 72 (L) 01/16/2025    CALCIUM 9.2 01/16/2025    ALBUMIN 2.1 (L) 11/08/2024    AST 27 11/08/2024    ALT 29 11/08/2024    ALKPHOS 139 (H) 11/08/2024    MG 2.2 01/16/2025        IMAGING  CT abdomen pelvis with IV contrast 11/2024  1. Nonspecific intraluminal fluid within loops of normal caliber colon, as may be seen the setting of diarrheal illness.  2. Additional details of chronic and incidental findings, as provided in the body of the report.    ASSESSMENT  Rosa Rutledge is a 44 y.o. AAF with history of chronic constipation and hypertension who is referred for follow-up of Crohn's disease.    1. Crohn's disease of small intestine with other complication           PLAN    - continue Humira as monotherapy  - continue Linzess daily for chronic constipation  - return to GI clinic for follow-up in 6 months, sooner as needed      I have counseled the patient on the risks of Humira TNFa including serious infections (TB, bacterial, fungal, opportunistic, etc) due to immunosuppression, malignancies such as Lymphomas (HL, NHL, HSTCL), demyelinating disease, lupus like syndrome, dermatologic reactions (psoriasiform eruption), infusion reactions, New/worsening of heart failure, Hep B/TB reactivation, Hepatotoxicity. Avoid TNFa use in patients with demyelinating disease (MS, optic neuritis, GBS, CIDP, myelitis etc), heart failure, HIV, Seizure disorders, h/o transplant.         I have  reviewed the IBD health maintenance as below.      IBD Health Maintenance:  -Prevnar 13: Recommend   -Pneumovax 23: Recommend 8 weeks after Prevnar 13  -Flu Shot: Recommend annually  -Shingrix: Recommend    -Hepatitis A/Hepatitis B: Check antibody and if not immune will vaccinate  -Annual pap smear: Recommend    -Annual skin exam: Recommend   -DEXA scan: Recommend  -Tobacco: Avoid  -Should avoid NSAIDs and narcotics, use only Tylenol for pain relief.     There are no Patient Instructions on file for this visit.      Orders Placed This Encounter   Procedures    Folate     Standing Status:   Future     Expected Date:   7/24/2025     Expiration Date:   9/24/2026    Vitamin B12     Standing Status:   Future     Expected Date:   7/24/2025     Expiration Date:   9/24/2026    Vitamin D     Standing Status:   Future     Expected Date:   7/24/2025     Expiration Date:   9/22/2026    Ferritin     Standing Status:   Future     Expected Date:   7/24/2025     Expiration Date:   9/22/2026    Iron and TIBC     Standing Status:   Future     Expected Date:   7/24/2025     Expiration Date:   9/22/2026    Comprehensive Metabolic Panel     Standing Status:   Future     Expected Date:   7/24/2025     Expiration Date:   9/22/2026    CBC Auto Differential     Standing Status:   Future     Expected Date:   7/24/2025     Expiration Date:   9/22/2026         The risks and benefits of my recommendations, as well as other treatment options were discussed with the patient today. All questions were answered.    35 minutes of total time spent on the encounter, which includes face to face time and non-face to face time preparing to see the patient (eg, review of tests), obtaining and/or reviewing separately obtained history, documenting clinical information in the electronic or other health record, Independently interpreting results (not separately reported) and communicating results to the patient/family/caregiver, or care coordination (not  separately reported).        Shruthi Winslow, FNP/ACNP  [facility]

## 2025-08-04 ENCOUNTER — PATIENT MESSAGE (OUTPATIENT)
Dept: ADMINISTRATIVE | Facility: OTHER | Age: 45
End: 2025-08-04
Payer: COMMERCIAL

## 2025-08-05 ENCOUNTER — OFFICE VISIT (OUTPATIENT)
Dept: DERMATOLOGY | Facility: CLINIC | Age: 45
End: 2025-08-05
Payer: COMMERCIAL

## 2025-08-05 DIAGNOSIS — L68.0 HIRSUTISM: Primary | ICD-10-CM

## 2025-08-05 DIAGNOSIS — L70.0 ACNE VULGARIS: ICD-10-CM

## 2025-08-05 DIAGNOSIS — L30.9 HAND DERMATITIS: ICD-10-CM

## 2025-08-05 RX ORDER — TRIAMCINOLONE ACETONIDE 1 MG/G
OINTMENT TOPICAL 2 TIMES DAILY
Qty: 454 G | Refills: 2 | Status: SHIPPED | OUTPATIENT
Start: 2025-08-05

## 2025-08-05 RX ORDER — SPIRONOLACTONE 50 MG/1
TABLET, FILM COATED ORAL
Qty: 180 TABLET | Refills: 1 | Status: SHIPPED | OUTPATIENT
Start: 2025-08-05

## 2025-08-05 RX ORDER — EFLORNITHINE HYDROCHLORIDE 139 MG/G
CREAM TOPICAL 2 TIMES DAILY
Qty: 45 G | Refills: 5 | Status: SHIPPED | OUTPATIENT
Start: 2025-08-05

## 2025-08-05 RX ORDER — ADAPALENE AND BENZOYL PEROXIDE GEL, 0.1%/2.5% 1; 25 MG/G; MG/G
GEL TOPICAL
Qty: 45 G | Refills: 5 | Status: SHIPPED | OUTPATIENT
Start: 2025-08-05

## 2025-08-05 NOTE — PROGRESS NOTES
[facility]  Rancho Eden MD    [address] [place] [address]  [phone]    Fax: [phone]    Patient Name: Rosa Rutledge  Medical Record Number: [id number]  PCP: Sourav Chavez MD  Age: 44 y.o. : 1980  Contact: [phone] (home)     History of Present Illness:     Rosa Rutledge is a 44 y.o.  female here for follow up of hand dermatitis. Treatment plan includes TAC 0.1% ointment which has improved when using.  She is also here for her acne and hirsutism.     The patient has no other concerns today.    Review of Systems:     Unremarkable other than mentioned above.     Physical Exam:     General: Relaxed, oriented, alert    Skin examination of the scalp, face, neck, chest, back, abdomen, upper extremities and lower extremities were normal except for as listed below      Assessment and Plan:     1. Acne Vulgaris   - Cysts, inflammatory papules and pustules, scars, and comedonal papules    Status: hormonal     Plan:   - spironolactone 50 mg daily x 3 weeks, increase to BID as tolerated  - Epiduo - Apply pea-sized amount to face very other night advancing to nightly when tolerated      I counseled the regarding the following:  Skin care: I discussed with the patient the importance of using cleansers, moisturizers and cosmetics that are  non-comedogenic.  Expectations: The patient is aware that it may take up to 2-3 months to see a 60-80% improvement of acne.      2. Hirsutism   - pigmented terminal hairs in male-pattern distribution    Plan:  - Vaniqa BID  - Spironolactone as above      Counseling  I counseled the patient regarding the following:  Skin care: Unwanted hair can be removed through temporary means: chemical depilatory, plucking, threading, waxing, shaving; or more permanent methods: laser hair removal or electrolysis.  Expectations: Hirsutism is excessive hair growth in women that is distributed in traditionally male areas. A work-up  should be performed to rule out familial forms of hirsutism, PCOS, adrenal and ovarian androgen hypersecretion, and drugs (anabolic steroids).  Contact office if: Patient develops irregular periods, weight gain, diabetes, or other features of virulization.    3. Hand dermatitis   - erythema and fissuring of palms and digits     - discussed this could represent intrinsic hand eczema or allergic contact dermatitis  - discussed Dupixent if it becomes unmanageable   - will consider patch testing if not improved   - TAC 0.1% ointment BID     Rancho Eden MD   Mohs Surgery/Dermatologic Oncology  Dermatology

## 2025-08-06 ENCOUNTER — RESULTS FOLLOW-UP (OUTPATIENT)
Dept: DERMATOLOGY | Facility: CLINIC | Age: 45
End: 2025-08-06
Payer: COMMERCIAL

## 2025-08-06 DIAGNOSIS — Z91.89 AT HIGH RISK FOR ADVERSE MEDICATION EVENT: Primary | ICD-10-CM

## 2025-08-21 DIAGNOSIS — M83.9 VITAMIN D DEFICIENT OSTEOMALACIA: ICD-10-CM

## 2025-08-21 DIAGNOSIS — E53.8 VITAMIN B12 DEFICIENCY: ICD-10-CM

## 2025-08-21 DIAGNOSIS — Z13.1 DIABETES MELLITUS SCREENING: Primary | ICD-10-CM

## 2025-08-21 DIAGNOSIS — Z86.39 HISTORY OF HYPOKALEMIA: ICD-10-CM

## 2025-08-21 DIAGNOSIS — Z76.0 MEDICATION REFILL: ICD-10-CM

## 2025-08-21 DIAGNOSIS — I10 ESSENTIAL HYPERTENSION: ICD-10-CM

## 2025-08-21 RX ORDER — LOSARTAN POTASSIUM 50 MG/1
50 TABLET ORAL DAILY
Qty: 90 TABLET | Refills: 3 | Status: SHIPPED | OUTPATIENT
Start: 2025-08-21

## 2025-08-21 RX ORDER — CYANOCOBALAMIN 1000 UG/ML
1000 INJECTION, SOLUTION INTRAMUSCULAR; SUBCUTANEOUS
Qty: 1 ML | Refills: 3 | Status: SHIPPED | OUTPATIENT
Start: 2025-08-21 | End: 2025-11-14

## 2025-08-21 RX ORDER — ERGOCALCIFEROL 1.25 MG/1
50000 CAPSULE ORAL
Qty: 3 CAPSULE | Refills: 3 | Status: SHIPPED | OUTPATIENT
Start: 2025-08-21

## 2025-08-21 RX ORDER — POTASSIUM CHLORIDE 750 MG/1
10 TABLET, EXTENDED RELEASE ORAL 2 TIMES DAILY
Qty: 90 TABLET | Refills: 3 | Status: SHIPPED | OUTPATIENT
Start: 2025-08-21

## 2025-08-25 ENCOUNTER — OFFICE VISIT (OUTPATIENT)
Dept: ORTHOPEDICS | Facility: CLINIC | Age: 45
End: 2025-08-25
Payer: COMMERCIAL

## 2025-08-25 VITALS
DIASTOLIC BLOOD PRESSURE: 84 MMHG | HEART RATE: 87 BPM | HEIGHT: 62 IN | WEIGHT: 274.94 LBS | SYSTOLIC BLOOD PRESSURE: 126 MMHG | BODY MASS INDEX: 50.59 KG/M2 | OXYGEN SATURATION: 100 %

## 2025-08-25 DIAGNOSIS — M17.12 ARTHRITIS OF LEFT KNEE: Primary | ICD-10-CM

## 2025-08-25 PROCEDURE — 99999PBSHW PR PBB SHADOW TECHNICAL ONLY FILED TO HB: Mod: PBBFAC,,,

## 2025-08-25 PROCEDURE — 1159F MED LIST DOCD IN RCRD: CPT | Mod: ,,, | Performed by: ORTHOPAEDIC SURGERY

## 2025-08-25 PROCEDURE — 99214 OFFICE O/P EST MOD 30 MIN: CPT | Mod: PBBFAC | Performed by: ORTHOPAEDIC SURGERY

## 2025-08-25 PROCEDURE — 3008F BODY MASS INDEX DOCD: CPT | Mod: ,,, | Performed by: ORTHOPAEDIC SURGERY

## 2025-08-25 PROCEDURE — 99999 PR PBB SHADOW E&M-EST. PATIENT-LVL IV: CPT | Mod: PBBFAC,,, | Performed by: ORTHOPAEDIC SURGERY

## 2025-08-25 PROCEDURE — 99213 OFFICE O/P EST LOW 20 MIN: CPT | Mod: S$PBB,25,, | Performed by: ORTHOPAEDIC SURGERY

## 2025-08-25 PROCEDURE — 3044F HG A1C LEVEL LT 7.0%: CPT | Mod: ,,, | Performed by: ORTHOPAEDIC SURGERY

## 2025-08-25 PROCEDURE — 3079F DIAST BP 80-89 MM HG: CPT | Mod: ,,, | Performed by: ORTHOPAEDIC SURGERY

## 2025-08-25 PROCEDURE — 20610 DRAIN/INJ JOINT/BURSA W/O US: CPT | Mod: PBBFAC,LT | Performed by: ORTHOPAEDIC SURGERY

## 2025-08-25 PROCEDURE — 3074F SYST BP LT 130 MM HG: CPT | Mod: ,,, | Performed by: ORTHOPAEDIC SURGERY

## 2025-08-25 PROCEDURE — 4010F ACE/ARB THERAPY RXD/TAKEN: CPT | Mod: ,,, | Performed by: ORTHOPAEDIC SURGERY

## 2025-08-25 RX ORDER — BUPIVACAINE HYDROCHLORIDE 2.5 MG/ML
1 INJECTION, SOLUTION EPIDURAL; INFILTRATION; INTRACAUDAL; PERINEURAL
Status: DISCONTINUED | OUTPATIENT
Start: 2025-08-25 | End: 2025-08-25 | Stop reason: HOSPADM

## 2025-08-25 RX ORDER — TRIAMCINOLONE ACETONIDE 40 MG/ML
40 INJECTION, SUSPENSION INTRA-ARTICULAR; INTRAMUSCULAR
Status: DISCONTINUED | OUTPATIENT
Start: 2025-08-25 | End: 2025-08-25 | Stop reason: HOSPADM

## 2025-08-25 RX ORDER — IBUPROFEN 800 MG/1
800 TABLET, FILM COATED ORAL 3 TIMES DAILY
Qty: 90 TABLET | Refills: 3 | Status: SHIPPED | OUTPATIENT
Start: 2025-08-25

## 2025-08-25 RX ADMIN — BUPIVACAINE HYDROCHLORIDE 1 ML: 2.5 INJECTION, SOLUTION EPIDURAL; INFILTRATION; INTRACAUDAL; PERINEURAL at 08:08

## 2025-08-25 RX ADMIN — TRIAMCINOLONE ACETONIDE 40 MG: 40 INJECTION, SUSPENSION INTRA-ARTICULAR; INTRAMUSCULAR at 08:08

## (undated) DEVICE — CANISTER SUCTION JUMBO 12L

## (undated) DEVICE — EXTRACTOR TIPLESS 2.4FRX1115CM

## (undated) DEVICE — GOWN NONREINF SET-IN SLV 2XL

## (undated) DEVICE — FIBER MOSES 365 DFL

## (undated) DEVICE — SET IRRI FLUID WARMING 300MMHG

## (undated) DEVICE — GUIDEWIRE ZIPWIRE STR .038 150

## (undated) DEVICE — KIT LITHOTOMY RUSH

## (undated) DEVICE — CATH AXXCESS URET 6F 70CM

## (undated) DEVICE — GLOVE SENSICARE PI SURG 7.5

## (undated) DEVICE — SOL NACL IRR 3000ML